# Patient Record
Sex: FEMALE | Race: WHITE | HISPANIC OR LATINO | URBAN - METROPOLITAN AREA
[De-identification: names, ages, dates, MRNs, and addresses within clinical notes are randomized per-mention and may not be internally consistent; named-entity substitution may affect disease eponyms.]

---

## 2017-05-03 ENCOUNTER — OUTPATIENT (OUTPATIENT)
Dept: OUTPATIENT SERVICES | Facility: HOSPITAL | Age: 72
LOS: 1 days | End: 2017-05-03
Payer: COMMERCIAL

## 2017-05-03 VITALS
OXYGEN SATURATION: 98 % | RESPIRATION RATE: 16 BRPM | HEART RATE: 60 BPM | WEIGHT: 153 LBS | TEMPERATURE: 98 F | SYSTOLIC BLOOD PRESSURE: 120 MMHG | HEIGHT: 59 IN | DIASTOLIC BLOOD PRESSURE: 70 MMHG

## 2017-05-03 DIAGNOSIS — Z01.818 ENCOUNTER FOR OTHER PREPROCEDURAL EXAMINATION: ICD-10-CM

## 2017-05-03 DIAGNOSIS — M17.11 UNILATERAL PRIMARY OSTEOARTHRITIS, RIGHT KNEE: ICD-10-CM

## 2017-05-03 DIAGNOSIS — Z98.89 OTHER SPECIFIED POSTPROCEDURAL STATES: Chronic | ICD-10-CM

## 2017-05-03 DIAGNOSIS — K81.9 CHOLECYSTITIS, UNSPECIFIED: Chronic | ICD-10-CM

## 2017-05-03 LAB
ANION GAP SERPL CALC-SCNC: 6 MMOL/L — SIGNIFICANT CHANGE UP (ref 5–17)
APTT BLD: 32.3 SEC — SIGNIFICANT CHANGE UP (ref 27.5–37.4)
BUN SERPL-MCNC: 26 MG/DL — HIGH (ref 7–18)
CALCIUM SERPL-MCNC: 8.7 MG/DL — SIGNIFICANT CHANGE UP (ref 8.4–10.5)
CHLORIDE SERPL-SCNC: 105 MMOL/L — SIGNIFICANT CHANGE UP (ref 96–108)
CO2 SERPL-SCNC: 28 MMOL/L — SIGNIFICANT CHANGE UP (ref 22–31)
CREAT SERPL-MCNC: 1.05 MG/DL — SIGNIFICANT CHANGE UP (ref 0.5–1.3)
GLUCOSE SERPL-MCNC: 88 MG/DL — SIGNIFICANT CHANGE UP (ref 70–99)
HCT VFR BLD CALC: 37.7 % — SIGNIFICANT CHANGE UP (ref 34.5–45)
HGB BLD-MCNC: 11.6 G/DL — SIGNIFICANT CHANGE UP (ref 11.5–15.5)
INR BLD: 1.02 RATIO — SIGNIFICANT CHANGE UP (ref 0.88–1.16)
MCHC RBC-ENTMCNC: 29.8 PG — SIGNIFICANT CHANGE UP (ref 27–34)
MCHC RBC-ENTMCNC: 30.9 GM/DL — LOW (ref 32–36)
MCV RBC AUTO: 96.6 FL — SIGNIFICANT CHANGE UP (ref 80–100)
MRSA PCR RESULT.: SIGNIFICANT CHANGE UP
PLATELET # BLD AUTO: 226 K/UL — SIGNIFICANT CHANGE UP (ref 150–400)
POTASSIUM SERPL-MCNC: 4.6 MMOL/L — SIGNIFICANT CHANGE UP (ref 3.5–5.3)
POTASSIUM SERPL-SCNC: 4.6 MMOL/L — SIGNIFICANT CHANGE UP (ref 3.5–5.3)
PROTHROM AB SERPL-ACNC: 11.1 SEC — SIGNIFICANT CHANGE UP (ref 9.8–12.7)
RBC # BLD: 3.9 M/UL — SIGNIFICANT CHANGE UP (ref 3.8–5.2)
RBC # FLD: 13.9 % — SIGNIFICANT CHANGE UP (ref 10.3–14.5)
S AUREUS DNA NOSE QL NAA+PROBE: DETECTED
SODIUM SERPL-SCNC: 139 MMOL/L — SIGNIFICANT CHANGE UP (ref 135–145)
TSH SERPL-MCNC: 1.29 UU/ML — SIGNIFICANT CHANGE UP (ref 0.34–4.82)
WBC # BLD: 4.6 K/UL — SIGNIFICANT CHANGE UP (ref 3.8–10.5)
WBC # FLD AUTO: 4.6 K/UL — SIGNIFICANT CHANGE UP (ref 3.8–10.5)

## 2017-05-03 PROCEDURE — 85610 PROTHROMBIN TIME: CPT

## 2017-05-03 PROCEDURE — 86901 BLOOD TYPING SEROLOGIC RH(D): CPT

## 2017-05-03 PROCEDURE — 87640 STAPH A DNA AMP PROBE: CPT

## 2017-05-03 PROCEDURE — 80048 BASIC METABOLIC PNL TOTAL CA: CPT

## 2017-05-03 PROCEDURE — G0463: CPT

## 2017-05-03 PROCEDURE — 84443 ASSAY THYROID STIM HORMONE: CPT

## 2017-05-03 PROCEDURE — 86850 RBC ANTIBODY SCREEN: CPT

## 2017-05-03 PROCEDURE — 85730 THROMBOPLASTIN TIME PARTIAL: CPT

## 2017-05-03 PROCEDURE — 85027 COMPLETE CBC AUTOMATED: CPT

## 2017-05-03 PROCEDURE — 87641 MR-STAPH DNA AMP PROBE: CPT

## 2017-05-03 PROCEDURE — 86900 BLOOD TYPING SEROLOGIC ABO: CPT

## 2017-05-03 NOTE — H&P PST ADULT - VENOUS THROMBOEMBOLISM CURRENT STATUS
none/major surgery, including arthroscopic and laparoscopic (greater than 1 hr) elective major lower extremity arthroplasty

## 2017-05-03 NOTE — H&P PST ADULT - PMH
Anemia    CAD (coronary artery disease)    Depression    History of hypertension    Hyperlipidemia    Hypothyroid    Myocardial infarction syndrome  Oct 2002, stent to mCx  Osteoarthritis

## 2017-05-03 NOTE — H&P PST ADULT - PSH
Cholecystitis  s/p cholecystectomy  History of angioplasty  2002: mCx  History of shoulder surgery  left-2012, right -2015

## 2017-05-03 NOTE — H&P PST ADULT - HISTORY OF PRESENT ILLNESS
70 year old female with pmhx of CAD, hypertension, depression, Hypothyroidism, anemia here today with c/o pain to right shoulder x months. Patient is here today for presurgical testing for scheduled right shoulder arthroscopy on 7/22/15 71 year old female with pmhx of CAD, hypertension, depression, Hypothyroidism, anemia here today with c/o pain to right knee x months. Patient is here today for presurgical testing for scheduled right total knee replacement on 5/11/17.

## 2017-05-03 NOTE — H&P PST ADULT - ASSESSMENT
70 year old female with right rotator cuff syndrome scheduled for right shoulder arthroscopy on 7/22/15. Patient is moderate risk for procedure 71 year old female with pmhx of CAD, hypertension, depression, Hypothyroidism, anemia here today with c/o pain to right knee x months. Patient is here today for presurgical testing for scheduled right total knee replacement on 5/11/17.  Patient is moderate risk for procedure 71 year old female with pmhx of CAD, hypertension, depression, Hypothyroidism, anemia presented with primary OA to right knee.

## 2017-05-03 NOTE — H&P PST ADULT - MS GEN HX ROS MEA POS PC
arthritis/right shoulder/joint pain/stiffness leg pain R/joint swelling/joint pain/stiffness/right knee/arthritis

## 2017-05-03 NOTE — H&P PST ADULT - FAMILY HISTORY
Mother  Still living? No  Family history of cardiomyopathy, Age at diagnosis: 61-70     Father  Still living? No  Family history of coronary artery disease, Age at diagnosis: 71-80     Sibling  Still living? No  Family history of coronary artery disease, Age at diagnosis: 61-70  Family history of lung cancer, Age at diagnosis: Age Unknown

## 2017-05-03 NOTE — H&P PST ADULT - NSANTHOSAYNRD_GEN_A_CORE
pt advised to f/u with pulmonology to be assessed  for MARY/No. MARY screening performed.  STOP BANG Legend: 0-2 = LOW Risk; 3-4 = INTERMEDIATE Risk; 5-8 = HIGH Risk

## 2017-05-05 RX ORDER — MUPIROCIN 20 MG/G
1 OINTMENT TOPICAL
Qty: 1 | Refills: 0 | OUTPATIENT
Start: 2017-05-05 | End: 2017-05-10

## 2017-05-11 ENCOUNTER — INPATIENT (INPATIENT)
Facility: HOSPITAL | Age: 72
LOS: 4 days | Discharge: NURSING, REHAB OR LONG-TERM CARE | DRG: 470 | End: 2017-05-16
Attending: ORTHOPAEDIC SURGERY | Admitting: ORTHOPAEDIC SURGERY
Payer: COMMERCIAL

## 2017-05-11 ENCOUNTER — RESULT REVIEW (OUTPATIENT)
Age: 72
End: 2017-05-11

## 2017-05-11 ENCOUNTER — TRANSCRIPTION ENCOUNTER (OUTPATIENT)
Age: 72
End: 2017-05-11

## 2017-05-11 VITALS
TEMPERATURE: 97 F | SYSTOLIC BLOOD PRESSURE: 116 MMHG | WEIGHT: 153 LBS | RESPIRATION RATE: 17 BRPM | DIASTOLIC BLOOD PRESSURE: 61 MMHG | HEART RATE: 61 BPM | OXYGEN SATURATION: 99 % | HEIGHT: 59 IN

## 2017-05-11 DIAGNOSIS — K81.9 CHOLECYSTITIS, UNSPECIFIED: Chronic | ICD-10-CM

## 2017-05-11 DIAGNOSIS — Z98.89 OTHER SPECIFIED POSTPROCEDURAL STATES: Chronic | ICD-10-CM

## 2017-05-11 DIAGNOSIS — M17.11 UNILATERAL PRIMARY OSTEOARTHRITIS, RIGHT KNEE: ICD-10-CM

## 2017-05-11 LAB
ANION GAP SERPL CALC-SCNC: 7 MMOL/L — SIGNIFICANT CHANGE UP (ref 5–17)
BUN SERPL-MCNC: 28 MG/DL — HIGH (ref 7–18)
CALCIUM SERPL-MCNC: 8.3 MG/DL — LOW (ref 8.4–10.5)
CHLORIDE SERPL-SCNC: 109 MMOL/L — HIGH (ref 96–108)
CO2 SERPL-SCNC: 28 MMOL/L — SIGNIFICANT CHANGE UP (ref 22–31)
CREAT SERPL-MCNC: 0.97 MG/DL — SIGNIFICANT CHANGE UP (ref 0.5–1.3)
GLUCOSE SERPL-MCNC: 93 MG/DL — SIGNIFICANT CHANGE UP (ref 70–99)
HCT VFR BLD CALC: 31.4 % — LOW (ref 34.5–45)
HGB BLD-MCNC: 9.9 G/DL — LOW (ref 11.5–15.5)
MCHC RBC-ENTMCNC: 30.5 PG — SIGNIFICANT CHANGE UP (ref 27–34)
MCHC RBC-ENTMCNC: 31.6 GM/DL — LOW (ref 32–36)
MCV RBC AUTO: 96.5 FL — SIGNIFICANT CHANGE UP (ref 80–100)
PLATELET # BLD AUTO: 180 K/UL — SIGNIFICANT CHANGE UP (ref 150–400)
POTASSIUM SERPL-MCNC: 4.3 MMOL/L — SIGNIFICANT CHANGE UP (ref 3.5–5.3)
POTASSIUM SERPL-SCNC: 4.3 MMOL/L — SIGNIFICANT CHANGE UP (ref 3.5–5.3)
RBC # BLD: 3.25 M/UL — LOW (ref 3.8–5.2)
RBC # FLD: 14.4 % — SIGNIFICANT CHANGE UP (ref 10.3–14.5)
SODIUM SERPL-SCNC: 144 MMOL/L — SIGNIFICANT CHANGE UP (ref 135–145)
WBC # BLD: 5 K/UL — SIGNIFICANT CHANGE UP (ref 3.8–10.5)
WBC # FLD AUTO: 5 K/UL — SIGNIFICANT CHANGE UP (ref 3.8–10.5)

## 2017-05-11 PROCEDURE — 20900 REMOVAL OF BONE FOR GRAFT: CPT | Mod: AS,RT

## 2017-05-11 PROCEDURE — 88305 TISSUE EXAM BY PATHOLOGIST: CPT | Mod: 26

## 2017-05-11 PROCEDURE — 73562 X-RAY EXAM OF KNEE 3: CPT | Mod: 26,RT

## 2017-05-11 PROCEDURE — 88311 DECALCIFY TISSUE: CPT | Mod: 26

## 2017-05-11 PROCEDURE — 27447 TOTAL KNEE ARTHROPLASTY: CPT | Mod: AS,RT

## 2017-05-11 RX ORDER — SODIUM CHLORIDE 9 MG/ML
3 INJECTION INTRAMUSCULAR; INTRAVENOUS; SUBCUTANEOUS EVERY 8 HOURS
Qty: 0 | Refills: 0 | Status: DISCONTINUED | OUTPATIENT
Start: 2017-05-11 | End: 2017-05-11

## 2017-05-11 RX ORDER — CLOPIDOGREL BISULFATE 75 MG/1
75 TABLET, FILM COATED ORAL DAILY
Qty: 0 | Refills: 0 | Status: DISCONTINUED | OUTPATIENT
Start: 2017-05-11 | End: 2017-05-16

## 2017-05-11 RX ORDER — SERTRALINE 25 MG/1
50 TABLET, FILM COATED ORAL DAILY
Qty: 0 | Refills: 0 | Status: DISCONTINUED | OUTPATIENT
Start: 2017-05-11 | End: 2017-05-16

## 2017-05-11 RX ORDER — ONDANSETRON 8 MG/1
4 TABLET, FILM COATED ORAL EVERY 6 HOURS
Qty: 0 | Refills: 0 | Status: DISCONTINUED | OUTPATIENT
Start: 2017-05-11 | End: 2017-05-16

## 2017-05-11 RX ORDER — FOLIC ACID 0.8 MG
1 TABLET ORAL DAILY
Qty: 0 | Refills: 0 | Status: DISCONTINUED | OUTPATIENT
Start: 2017-05-11 | End: 2017-05-16

## 2017-05-11 RX ORDER — FERROUS SULFATE 325(65) MG
325 TABLET ORAL
Qty: 0 | Refills: 0 | Status: DISCONTINUED | OUTPATIENT
Start: 2017-05-11 | End: 2017-05-16

## 2017-05-11 RX ORDER — ZOLPIDEM TARTRATE 10 MG/1
5 TABLET ORAL AT BEDTIME
Qty: 0 | Refills: 0 | Status: DISCONTINUED | OUTPATIENT
Start: 2017-05-11 | End: 2017-05-16

## 2017-05-11 RX ORDER — ENOXAPARIN SODIUM 100 MG/ML
30 INJECTION SUBCUTANEOUS EVERY 12 HOURS
Qty: 0 | Refills: 0 | Status: DISCONTINUED | OUTPATIENT
Start: 2017-05-12 | End: 2017-05-16

## 2017-05-11 RX ORDER — FUROSEMIDE 40 MG
20 TABLET ORAL DAILY
Qty: 0 | Refills: 0 | Status: DISCONTINUED | OUTPATIENT
Start: 2017-05-11 | End: 2017-05-16

## 2017-05-11 RX ORDER — MAGNESIUM HYDROXIDE 400 MG/1
30 TABLET, CHEWABLE ORAL DAILY
Qty: 0 | Refills: 0 | Status: DISCONTINUED | OUTPATIENT
Start: 2017-05-11 | End: 2017-05-16

## 2017-05-11 RX ORDER — ACETAMINOPHEN 500 MG
650 TABLET ORAL EVERY 6 HOURS
Qty: 0 | Refills: 0 | Status: DISCONTINUED | OUTPATIENT
Start: 2017-05-11 | End: 2017-05-16

## 2017-05-11 RX ORDER — DOXAZOSIN MESYLATE 4 MG
1 TABLET ORAL AT BEDTIME
Qty: 0 | Refills: 0 | Status: DISCONTINUED | OUTPATIENT
Start: 2017-05-11 | End: 2017-05-16

## 2017-05-11 RX ORDER — PANTOPRAZOLE SODIUM 20 MG/1
40 TABLET, DELAYED RELEASE ORAL
Qty: 0 | Refills: 0 | Status: DISCONTINUED | OUTPATIENT
Start: 2017-05-11 | End: 2017-05-16

## 2017-05-11 RX ORDER — SODIUM CHLORIDE 9 MG/ML
1000 INJECTION, SOLUTION INTRAVENOUS
Qty: 0 | Refills: 0 | Status: DISCONTINUED | OUTPATIENT
Start: 2017-05-11 | End: 2017-05-16

## 2017-05-11 RX ORDER — DOCUSATE SODIUM 100 MG
100 CAPSULE ORAL THREE TIMES A DAY
Qty: 0 | Refills: 0 | Status: DISCONTINUED | OUTPATIENT
Start: 2017-05-11 | End: 2017-05-16

## 2017-05-11 RX ORDER — ASCORBIC ACID 60 MG
500 TABLET,CHEWABLE ORAL
Qty: 0 | Refills: 0 | Status: DISCONTINUED | OUTPATIENT
Start: 2017-05-11 | End: 2017-05-16

## 2017-05-11 RX ORDER — BUPIVACAINE 13.3 MG/ML
20 INJECTION, SUSPENSION, LIPOSOMAL INFILTRATION ONCE
Qty: 0 | Refills: 0 | Status: DISCONTINUED | OUTPATIENT
Start: 2017-05-11 | End: 2017-05-16

## 2017-05-11 RX ORDER — SENNA PLUS 8.6 MG/1
2 TABLET ORAL AT BEDTIME
Qty: 0 | Refills: 0 | Status: DISCONTINUED | OUTPATIENT
Start: 2017-05-11 | End: 2017-05-16

## 2017-05-11 RX ORDER — ACETAMINOPHEN 500 MG
1000 TABLET ORAL ONCE
Qty: 0 | Refills: 0 | Status: COMPLETED | OUTPATIENT
Start: 2017-05-11 | End: 2017-05-12

## 2017-05-11 RX ORDER — GEMFIBROZIL 600 MG
600 TABLET ORAL
Qty: 0 | Refills: 0 | Status: DISCONTINUED | OUTPATIENT
Start: 2017-05-11 | End: 2017-05-16

## 2017-05-11 RX ORDER — VALSARTAN 80 MG/1
320 TABLET ORAL DAILY
Qty: 0 | Refills: 0 | Status: DISCONTINUED | OUTPATIENT
Start: 2017-05-11 | End: 2017-05-16

## 2017-05-11 RX ORDER — CEFAZOLIN SODIUM 1 G
1000 VIAL (EA) INJECTION EVERY 8 HOURS
Qty: 0 | Refills: 0 | Status: COMPLETED | OUTPATIENT
Start: 2017-05-11 | End: 2017-05-12

## 2017-05-11 RX ORDER — HYDROMORPHONE HYDROCHLORIDE 2 MG/ML
0.5 INJECTION INTRAMUSCULAR; INTRAVENOUS; SUBCUTANEOUS EVERY 4 HOURS
Qty: 0 | Refills: 0 | Status: DISCONTINUED | OUTPATIENT
Start: 2017-05-11 | End: 2017-05-16

## 2017-05-11 RX ORDER — GABAPENTIN 400 MG/1
300 CAPSULE ORAL
Qty: 0 | Refills: 0 | Status: DISCONTINUED | OUTPATIENT
Start: 2017-05-11 | End: 2017-05-16

## 2017-05-11 RX ORDER — CARVEDILOL PHOSPHATE 80 MG/1
25 CAPSULE, EXTENDED RELEASE ORAL EVERY 12 HOURS
Qty: 0 | Refills: 0 | Status: DISCONTINUED | OUTPATIENT
Start: 2017-05-11 | End: 2017-05-16

## 2017-05-11 RX ORDER — LEVOTHYROXINE SODIUM 125 MCG
75 TABLET ORAL DAILY
Qty: 0 | Refills: 0 | Status: DISCONTINUED | OUTPATIENT
Start: 2017-05-11 | End: 2017-05-16

## 2017-05-11 RX ADMIN — ZOLPIDEM TARTRATE 5 MILLIGRAM(S): 10 TABLET ORAL at 21:58

## 2017-05-11 RX ADMIN — Medication 600 MILLIGRAM(S): at 18:39

## 2017-05-11 RX ADMIN — ZOLPIDEM TARTRATE 5 MILLIGRAM(S): 10 TABLET ORAL at 23:59

## 2017-05-11 RX ADMIN — Medication 500 MILLIGRAM(S): at 18:34

## 2017-05-11 RX ADMIN — Medication 100 MILLIGRAM(S): at 18:45

## 2017-05-11 RX ADMIN — Medication 325 MILLIGRAM(S): at 18:37

## 2017-05-11 RX ADMIN — CARVEDILOL PHOSPHATE 25 MILLIGRAM(S): 80 CAPSULE, EXTENDED RELEASE ORAL at 18:34

## 2017-05-11 RX ADMIN — Medication 100 MILLIGRAM(S): at 21:32

## 2017-05-11 RX ADMIN — Medication 1 MILLIGRAM(S): at 21:32

## 2017-05-11 RX ADMIN — GABAPENTIN 300 MILLIGRAM(S): 400 CAPSULE ORAL at 18:39

## 2017-05-11 RX ADMIN — SODIUM CHLORIDE 3 MILLILITER(S): 9 INJECTION INTRAMUSCULAR; INTRAVENOUS; SUBCUTANEOUS at 07:57

## 2017-05-12 ENCOUNTER — TRANSCRIPTION ENCOUNTER (OUTPATIENT)
Age: 72
End: 2017-05-12

## 2017-05-12 DIAGNOSIS — M25.561 PAIN IN RIGHT KNEE: ICD-10-CM

## 2017-05-12 DIAGNOSIS — Z96.651 PRESENCE OF RIGHT ARTIFICIAL KNEE JOINT: ICD-10-CM

## 2017-05-12 DIAGNOSIS — G89.18 OTHER ACUTE POSTPROCEDURAL PAIN: ICD-10-CM

## 2017-05-12 LAB
ANION GAP SERPL CALC-SCNC: 8 MMOL/L — SIGNIFICANT CHANGE UP (ref 5–17)
BUN SERPL-MCNC: 18 MG/DL — SIGNIFICANT CHANGE UP (ref 7–18)
CALCIUM SERPL-MCNC: 7.9 MG/DL — LOW (ref 8.4–10.5)
CHLORIDE SERPL-SCNC: 106 MMOL/L — SIGNIFICANT CHANGE UP (ref 96–108)
CO2 SERPL-SCNC: 26 MMOL/L — SIGNIFICANT CHANGE UP (ref 22–31)
CREAT SERPL-MCNC: 0.98 MG/DL — SIGNIFICANT CHANGE UP (ref 0.5–1.3)
GLUCOSE SERPL-MCNC: 118 MG/DL — HIGH (ref 70–99)
HCT VFR BLD CALC: 28 % — LOW (ref 34.5–45)
HGB BLD-MCNC: 9 G/DL — LOW (ref 11.5–15.5)
MCHC RBC-ENTMCNC: 31 PG — SIGNIFICANT CHANGE UP (ref 27–34)
MCHC RBC-ENTMCNC: 32.1 GM/DL — SIGNIFICANT CHANGE UP (ref 32–36)
MCV RBC AUTO: 96.3 FL — SIGNIFICANT CHANGE UP (ref 80–100)
PLATELET # BLD AUTO: 154 K/UL — SIGNIFICANT CHANGE UP (ref 150–400)
POTASSIUM SERPL-MCNC: 4.2 MMOL/L — SIGNIFICANT CHANGE UP (ref 3.5–5.3)
POTASSIUM SERPL-SCNC: 4.2 MMOL/L — SIGNIFICANT CHANGE UP (ref 3.5–5.3)
RBC # BLD: 2.91 M/UL — LOW (ref 3.8–5.2)
RBC # FLD: 14.3 % — SIGNIFICANT CHANGE UP (ref 10.3–14.5)
SODIUM SERPL-SCNC: 140 MMOL/L — SIGNIFICANT CHANGE UP (ref 135–145)
WBC # BLD: 5.8 K/UL — SIGNIFICANT CHANGE UP (ref 3.8–10.5)
WBC # FLD AUTO: 5.8 K/UL — SIGNIFICANT CHANGE UP (ref 3.8–10.5)

## 2017-05-12 PROCEDURE — 99232 SBSQ HOSP IP/OBS MODERATE 35: CPT

## 2017-05-12 RX ORDER — DOCUSATE SODIUM 100 MG
1 CAPSULE ORAL
Qty: 0 | Refills: 0 | COMMUNITY
Start: 2017-05-12

## 2017-05-12 RX ORDER — ENOXAPARIN SODIUM 100 MG/ML
40 INJECTION SUBCUTANEOUS
Qty: 0 | Refills: 0 | DISCHARGE
Start: 2017-05-12

## 2017-05-12 RX ORDER — SODIUM CHLORIDE 9 MG/ML
500 INJECTION INTRAMUSCULAR; INTRAVENOUS; SUBCUTANEOUS ONCE
Qty: 0 | Refills: 0 | Status: COMPLETED | OUTPATIENT
Start: 2017-05-12 | End: 2017-05-12

## 2017-05-12 RX ORDER — ACETAMINOPHEN 500 MG
2 TABLET ORAL
Qty: 0 | Refills: 0 | COMMUNITY

## 2017-05-12 RX ORDER — FOLIC ACID 0.8 MG
1 TABLET ORAL
Qty: 0 | Refills: 0 | COMMUNITY
Start: 2017-05-12

## 2017-05-12 RX ORDER — ASCORBIC ACID 60 MG
1 TABLET,CHEWABLE ORAL
Qty: 0 | Refills: 0 | DISCHARGE
Start: 2017-05-12

## 2017-05-12 RX ORDER — KETOROLAC TROMETHAMINE 30 MG/ML
15 SYRINGE (ML) INJECTION EVERY 6 HOURS
Qty: 0 | Refills: 0 | Status: DISCONTINUED | OUTPATIENT
Start: 2017-05-12 | End: 2017-05-16

## 2017-05-12 RX ADMIN — Medication 325 MILLIGRAM(S): at 08:32

## 2017-05-12 RX ADMIN — Medication 325 MILLIGRAM(S): at 17:09

## 2017-05-12 RX ADMIN — SODIUM CHLORIDE 500 MILLILITER(S): 9 INJECTION INTRAMUSCULAR; INTRAVENOUS; SUBCUTANEOUS at 10:08

## 2017-05-12 RX ADMIN — Medication 1000 MILLIGRAM(S): at 13:00

## 2017-05-12 RX ADMIN — Medication 325 MILLIGRAM(S): at 12:35

## 2017-05-12 RX ADMIN — Medication 600 MILLIGRAM(S): at 17:08

## 2017-05-12 RX ADMIN — ZOLPIDEM TARTRATE 5 MILLIGRAM(S): 10 TABLET ORAL at 21:55

## 2017-05-12 RX ADMIN — CARVEDILOL PHOSPHATE 25 MILLIGRAM(S): 80 CAPSULE, EXTENDED RELEASE ORAL at 05:30

## 2017-05-12 RX ADMIN — Medication 500 MILLIGRAM(S): at 05:30

## 2017-05-12 RX ADMIN — ENOXAPARIN SODIUM 30 MILLIGRAM(S): 100 INJECTION SUBCUTANEOUS at 17:09

## 2017-05-12 RX ADMIN — Medication 100 MILLIGRAM(S): at 12:35

## 2017-05-12 RX ADMIN — Medication 400 MILLIGRAM(S): at 12:35

## 2017-05-12 RX ADMIN — GABAPENTIN 300 MILLIGRAM(S): 400 CAPSULE ORAL at 05:30

## 2017-05-12 RX ADMIN — ENOXAPARIN SODIUM 30 MILLIGRAM(S): 100 INJECTION SUBCUTANEOUS at 05:30

## 2017-05-12 RX ADMIN — Medication 1 MILLIGRAM(S): at 12:35

## 2017-05-12 RX ADMIN — Medication 500 MILLIGRAM(S): at 17:08

## 2017-05-12 RX ADMIN — GABAPENTIN 300 MILLIGRAM(S): 400 CAPSULE ORAL at 17:08

## 2017-05-12 RX ADMIN — VALSARTAN 320 MILLIGRAM(S): 80 TABLET ORAL at 05:30

## 2017-05-12 RX ADMIN — Medication 100 MILLIGRAM(S): at 21:55

## 2017-05-12 RX ADMIN — Medication 100 MILLIGRAM(S): at 01:03

## 2017-05-12 RX ADMIN — Medication 100 MILLIGRAM(S): at 05:30

## 2017-05-12 RX ADMIN — CLOPIDOGREL BISULFATE 75 MILLIGRAM(S): 75 TABLET, FILM COATED ORAL at 12:34

## 2017-05-12 RX ADMIN — PANTOPRAZOLE SODIUM 40 MILLIGRAM(S): 20 TABLET, DELAYED RELEASE ORAL at 05:30

## 2017-05-12 RX ADMIN — Medication 75 MICROGRAM(S): at 05:30

## 2017-05-12 RX ADMIN — Medication 20 MILLIGRAM(S): at 05:30

## 2017-05-12 RX ADMIN — SERTRALINE 50 MILLIGRAM(S): 25 TABLET, FILM COATED ORAL at 12:35

## 2017-05-12 NOTE — DISCHARGE NOTE ADULT - CARE PLAN
Principal Discharge DX:	Primary osteoarthritis of right knee  Goal:	Right Total Knee Replacement  Instructions for follow-up, activity and diet:	Improve ROM

## 2017-05-12 NOTE — DISCHARGE NOTE ADULT - MEDICATION SUMMARY - MEDICATIONS TO STOP TAKING
I will STOP taking the medications listed below when I get home from the hospital:    Tylenol 500 mg oral tablet  -- 2 tab(s) by mouth every 6 hours, As Needed    mupirocin topical 2% topical ointment  -- 1 application in each nostril 2 times a day for 5 days  -- For external use only.

## 2017-05-12 NOTE — DISCHARGE NOTE ADULT - CARE PROVIDER_API CALL
Jeremias Riley), Orthopaedic Surgery  [address] [place] [address]  Phone: [phone]  Fax: [phone]

## 2017-05-12 NOTE — PROGRESS NOTE ADULT - PROBLEM SELECTOR PLAN 1
Scheduled right total knee replacement on 5/11/17.  Patient is moderate risk for procedure
- toradol 15mg ivp prn  - percocet po prn  - oob  - 500cc iv bolus  - monitor vitals

## 2017-05-12 NOTE — PROGRESS NOTE ADULT - SUBJECTIVE AND OBJECTIVE BOX
Chief Complaint: 71 year old female s/p right knee replacement.  + pain right knee.  Pt oob with Pt.  + dizziness and hypotension on standing and ambulating.        Allergies    No Known Allergies    Intolerances      MEDICATIONS  (STANDING):  BUpivacaine liposome 1.3% Injectable 20milliLiter(s) Local Injection once  valsartan 320milliGRAM(s) Oral daily  doxazosin 1milliGRAM(s) Oral at bedtime  gabapentin 300milliGRAM(s) Oral two times a day  sertraline 50milliGRAM(s) Oral daily  gemfibrozil 600milliGRAM(s) Oral before dinner  clopidogrel Tablet 75milliGRAM(s) Oral daily  carvedilol 25milliGRAM(s) Oral every 12 hours  furosemide    Tablet 20milliGRAM(s) Oral daily  pantoprazole    Tablet 40milliGRAM(s) Oral before breakfast  levothyroxine 75MICROGram(s) Oral daily  dextrose 5% + sodium chloride 0.45%. 1000milliLiter(s) IV Continuous <Continuous>  enoxaparin Injectable 30milliGRAM(s) SubCutaneous every 12 hours  docusate sodium 100milliGRAM(s) Oral three times a day  ferrous    sulfate 325milliGRAM(s) Oral three times a day with meals  folic acid 1milliGRAM(s) Oral daily  ascorbic acid 500milliGRAM(s) Oral two times a day  acetaminophen  IVPB. 1000milliGRAM(s) IV Intermittent once    MEDICATIONS  (PRN):  acetaminophen   Tablet 650milliGRAM(s) Oral every 6 hours PRN For Temp over 38.3 C (100.94 F)  aluminum hydroxide/magnesium hydroxide/simethicone Suspension 30milliLiter(s) Oral four times a day PRN Indigestion  ondansetron Injectable 4milliGRAM(s) IV Push every 6 hours PRN Nausea and/or Vomiting  magnesium hydroxide Suspension 30milliLiter(s) Oral daily PRN Constipation  senna 2Tablet(s) Oral at bedtime PRN Constipation  oxyCODONE  5 mG/acetaminophen 325 mG 1Tablet(s) Oral every 4 hours PRN Mild Pain (1 - 3)  oxyCODONE  5 mG/acetaminophen 325 mG 2Tablet(s) Oral every 4 hours PRN Moderate Pain (4 - 6)  HYDROmorphone  Injectable 0.5milliGRAM(s) IV Push every 4 hours PRN Severe Pain (7 - 10)  zolpidem 5milliGRAM(s) Oral at bedtime PRN Insomnia  zolpidem 5milliGRAM(s) Oral at bedtime PRN Insomnia  ketorolac   Injectable 15milliGRAM(s) IV Push every 6 hours PRN breakthrough pain        Pain is __X_ sharp ____dull ___burning ___achy ___     Intensity: ____ mild _X___mod ____severe     Location _X____surgical site _____cervical _____lumbar ____abd _____upper ext___X_lower ext ____other    Worse with __X__activity ____movement _____physical therapy___ Rest    Improved with ___X_medication ____rest ____physical therapy      REVIEW OF SYSTEMS:  CONSTITUTIONAL: Negative fever, chills  NECK: No pain or stiffness  RESPIRATORY: No cough, wheezing,  No shortness of breath  GASTROINTESTINAL: No abdominal, No nausea, vomiting; No diarrhea or constipation.   GENITOURINARY: No dysuria, frequency, or incontinence  NEUROLOGICAL:+ dizziness  SKIN: No itching, burning, rashes, or lesions   MUSCULOSKELETAL: +right knee pain    PHYSICAL EXAM:    Vital Signs Last 24 Hrs  T(C): 36.6, Max: 36.7 (05-11 @ 13:05)  T(F): 97.9, Max: 98.1 (05-11 @ 13:05)  HR: 67 (55 - 71)  BP: 94/52 (94/52 - 144/61)  BP(mean): 75 (71 - 76)  RR: 17 (12 - 18)  SpO2: 95% (95% - 100%)    PAIN SCORE:    5/10     SCALE USED: (1-10 VNRS)       GENERAL: +dizziness, lightheaded   HEENT:  Atraumatic, Normocephalic, PERRL, EOMI  NECK: Supple  LUNG: Respiration even and unlabored, no distress noted  ABDOMEN: Soft, Nontender, Nondistended  BACK: No midline bony tenderness to palpation, no step off or deformity noted.   EXTREMITIES:  MACIAS X 4; 2+ Peripheral Pulses, +right knee pain, mild edema  NEUROLOGIC: Awake, alert and oriented X3;   SKIN: Warm and Dry    LABS:                          9.0    5.8   )-----------( 154      ( 12 May 2017 06:32 )             28.0     05-12    140  |  106  |  18  ----------------------------<  118<H>  4.2   |  26  |  0.98    Ca    7.9<L>      12 May 2017 06:32

## 2017-05-12 NOTE — PHYSICAL THERAPY INITIAL EVALUATION ADULT - CRITERIA FOR SKILLED THERAPEUTIC INTERVENTIONS
rehab potential/functional limitations in following categories/predicted duration of therapy intervention/impairments found/therapy frequency

## 2017-05-12 NOTE — DISCHARGE NOTE ADULT - MEDICATION SUMMARY - MEDICATIONS TO TAKE
I will START or STAY ON the medications listed below when I get home from the hospital:    Percocet 5/325 oral tablet  -- 1 tab(s) by mouth every 4 hours  -- Indication: For Pain    valsartan 320 mg oral tablet  -- 1 tab(s) by mouth once a day  -- Indication: For as per md    doxazosin 1 mg oral tablet  -- 1 tab(s) by mouth once a day (at bedtime)  -- Indication: For as per md    enoxaparin  -- 40 milligram(s) subcutaneous once a day  -- d/c 5/26/17  -- Indication: For dvt prophylaxis    gabapentin 300 mg oral capsule  -- 1 cap(s) by mouth 2 times a day  -- Indication: For as per md    sertraline 50 mg oral tablet  -- 1 tab(s) by mouth once a day  -- Indication: For as per md    gemfibrozil 600 mg oral tablet  -- 1 tab(s) by mouth once a day (at bedtime)  -- Indication: For as per md    Plavix 75 mg oral tablet  -- 1 tab(s) by mouth once a day  -- Indication: For as per md    zolpidem 10 mg oral tablet  -- 1 tab(s) by mouth once a day (at bedtime)  -- Indication: For as per md    carvedilol 25 mg oral tablet  -- 1 tab(s) by mouth 2 times a day  -- Indication: For as per md    furosemide 40 mg oral tablet  -- 1 tab(s) by mouth once a day  -- Indication: For as per md    ferrous sulfate 325 mg (65 mg elemental iron) oral tablet  -- 1 tab(s) by mouth 2 times a day  -- Indication: For anemia    docusate sodium 100 mg oral capsule  -- 1 cap(s) by mouth 3 times a day  -- Indication: For constipation    omeprazole 40 mg oral delayed release capsule  -- 1 cap(s) by mouth once a day  -- Indication: For as per md    levothyroxine 75 mcg (0.075 mg) oral tablet  -- 1 tab(s) by mouth once a day  -- Indication: For as per md    ascorbic acid 500 mg oral tablet  -- 1 tab(s) by mouth 2 times a day  -- Indication: For supplement    folic acid 1 mg oral tablet  -- 1 tab(s) by mouth once a day  -- Indication: For supplement I will START or STAY ON the medications listed below when I get home from the hospital:    Percocet 5/325 oral tablet  -- 1 tab(s) by mouth every 4 hours MDD:6 tabs  -- Indication: For Pain    valsartan 320 mg oral tablet  -- 1 tab(s) by mouth once a day  -- Indication: For as per md    doxazosin 1 mg oral tablet  -- 1 tab(s) by mouth once a day (at bedtime)  -- Indication: For as per md    enoxaparin  -- 40 milligram(s) subcutaneous once a day  -- d/c 5/26/17  -- Indication: For dvt prophylaxis    enoxaparin 40 mg/0.4 mL injectable solution  -- 40 milligram(s) subcutaneous once a day MDD:40mg  -- It is very important that you take or use this exactly as directed.  Do not skip doses or discontinue unless directed by your doctor.    -- Indication: For dvt prophylaxis    gabapentin 300 mg oral capsule  -- 1 cap(s) by mouth 2 times a day  -- Indication: For as per md    sertraline 50 mg oral tablet  -- 1 tab(s) by mouth once a day  -- Indication: For as per md    gemfibrozil 600 mg oral tablet  -- 1 tab(s) by mouth once a day (at bedtime)  -- Indication: For as per md    Plavix 75 mg oral tablet  -- 1 tab(s) by mouth once a day  -- Indication: For as per md    zolpidem 10 mg oral tablet  -- 1 tab(s) by mouth once a day (at bedtime)  -- Indication: For as per md    carvedilol 25 mg oral tablet  -- 1 tab(s) by mouth 2 times a day  -- Indication: For as per md    furosemide 40 mg oral tablet  -- 1 tab(s) by mouth once a day  -- Indication: For as per md    ferrous sulfate 325 mg (65 mg elemental iron) oral tablet  -- 1 tab(s) by mouth 2 times a day  -- Indication: For Anemia    docusate sodium 100 mg oral capsule  -- 1 cap(s) by mouth 2 times a day  -- Indication: For constipation    omeprazole 40 mg oral delayed release capsule  -- 1 cap(s) by mouth once a day  -- Indication: For as per md    levothyroxine 75 mcg (0.075 mg) oral tablet  -- 1 tab(s) by mouth once a day  -- Indication: For as per md    ascorbic acid 500 mg oral tablet  -- 1 tab(s) by mouth 2 times a day  -- Indication: For Supplement    folic acid 1 mg oral tablet  -- 1 tab(s) by mouth once a day  -- Indication: For Supplement I will START or STAY ON the medications listed below when I get home from the hospital:    acetaminophen-oxycodone 325 mg-5 mg oral tablet  -- 1 tab(s) by mouth every 4 hours, As needed, Mild Pain (1 - 3)  -- Indication: For mild pain     acetaminophen-oxycodone 325 mg-5 mg oral tablet  -- 2 tab(s) by mouth every 4 hours, As needed, Moderate Pain (4 - 6)  -- Indication: For moderate pain     valsartan 320 mg oral tablet  -- 1 tab(s) by mouth once a day  -- Indication: For as per md    doxazosin 1 mg oral tablet  -- 1 tab(s) by mouth once a day (at bedtime)  -- Indication: For as per md    enoxaparin  -- 40 milligram(s) subcutaneous once a day  -- d/c 5/26/17  -- Indication: For dvt prophylaxis    gabapentin 300 mg oral capsule  -- 1 cap(s) by mouth 2 times a day  -- Indication: For as per md    sertraline 50 mg oral tablet  -- 1 tab(s) by mouth once a day  -- Indication: For as per md    gemfibrozil 600 mg oral tablet  -- 1 tab(s) by mouth once a day (at bedtime)  -- Indication: For as per md    Plavix 75 mg oral tablet  -- 1 tab(s) by mouth once a day  -- Indication: For as per md    zolpidem 10 mg oral tablet  -- 1 tab(s) by mouth once a day (at bedtime)  -- Indication: For as per md    carvedilol 25 mg oral tablet  -- 1 tab(s) by mouth 2 times a day  -- Indication: For as per md    furosemide 40 mg oral tablet  -- 1 tab(s) by mouth once a day  -- Indication: For as per md    ferrous sulfate 325 mg (65 mg elemental iron) oral tablet  -- 1 tab(s) by mouth 3 times a day  -- Indication: For Anemia     docusate sodium 100 mg oral capsule  -- 1 cap(s) by mouth 3 times a day  -- Indication: For constipation     omeprazole 40 mg oral delayed release capsule  -- 1 cap(s) by mouth once a day  -- Indication: For as per md    levothyroxine 75 mcg (0.075 mg) oral tablet  -- 1 tab(s) by mouth once a day  -- Indication: For as per md    ascorbic acid 500 mg oral tablet  -- 1 tab(s) by mouth 2 times a day  -- Indication: For viatmin     folic acid 1 mg oral tablet  -- 1 tab(s) by mouth once a day  -- Indication: For vitamin

## 2017-05-12 NOTE — DISCHARGE NOTE ADULT - ADDITIONAL INSTRUCTIONS
Pain Management- See Attached Medication Reconciliation    **StretchStrap Stretching exercises for Total knee replacement***  Weight Bearing Status: _WBAT of RLE  Equipment needs: Commode, Walker  Incision Site: REMOVE STAPLES on 5/26/17  NURSE TO APPLY STERI-STRIPS TO THE WOUND AFTER STAPLE REMOVAL   Dvt prophylaxis: D/C LOVENOX on 5/26/17  PT/Occupational Therapy are Activities of Daily Living as appropriate  Follow up with Orthopedic Surgeon after STAPLES ARE REMOVED at: Dr. Riley [phone]

## 2017-05-12 NOTE — PROGRESS NOTE ADULT - SUBJECTIVE AND OBJECTIVE BOX
71yFemale    Diagnosis:  S/p R Total Knee Replacement POD# 1    Patient was seen and evaluated at bedside. Patient with no acute complaints.   Pain is well controlled.   Denies CP/SOB, dyspnea, paresthesias, N/V/D, palpitations. Due to void    Vital Signs Last 24 Hrs  T(C): 36.6, Max: 36.7 (05-11 @ 13:05)  T(F): 97.9, Max: 98.1 (05-11 @ 13:05)  HR: 71 (55 - 71)  BP: 98/47 (98/47 - 144/61)  BP(mean): 75 (71 - 76)  RR: 17 (12 - 18)  SpO2: 100% (99% - 100%)  I&O's Detail    I & Os for current day (as of 12 May 2017 08:37)  =============================================  IN:    dextrose 5% + sodium chloride 0.45%.: 75 ml    Total IN: 75 ml  ---------------------------------------------  OUT:    Indwelling Catheter - Urethral: 1775 ml    Accordian: 420 ml    Total OUT: 2195 ml  ---------------------------------------------  Total NET: -2120 ml      Physical Exam:    General: AAOx3, NAD, resting comfortably in bed.    R KNEE:  Dressing is C/D/I. Skin is pink and warm. Staples intact. No erythema. SILT.  Wound with no drainage, healing well. HV drain intact.  Lower extremity:  No calf tenderness, calves are soft B/l. 2+pulses. NVI. 5/5 Strength of EHL/TA/gastrocnemius B/L.  Good capillary refill.                           9.0    5.8   )-----------( 154      ( 12 May 2017 06:32 )             28.0     05-12    140  |  106  |  18  ----------------------------<  118<H>  4.2   |  26  |  0.98    Ca    7.9<L>      12 May 2017 06:32        Impression:  71yFemale S/p R Total Knee Replacement POD# 1  Plan:  -  Pain management  -  Dvt prophylaxis  -  Daily Physical Theapy:  WBAT  of the R lower extremity  -  Discharge planning: Home Vs. Rehab pending Physical therapy eval.  -  Continue with Post-op Antibiotics x 24hrs  -  Discontinue Schmitz catheter today  -  Discontinue HV today  -  Case d/w DR. Riley

## 2017-05-12 NOTE — PHYSICAL THERAPY INITIAL EVALUATION ADULT - GAIT DEVIATIONS NOTED, PT EVAL
increased time in double stance/decreased bobo/decreased weight-shifting ability/decreased step length/decreased velocity of limb motion

## 2017-05-12 NOTE — PHYSICAL THERAPY INITIAL EVALUATION ADULT - PASSIVE RANGE OF MOTION EXAMINATION, REHAB EVAL
bilateral upper extremity Passive ROM was WNL (within normal limits)/Left LE Passive ROM was WNL (within normal limits)/deficits as listed below/R knee flexion 50 deg

## 2017-05-13 LAB
ANION GAP SERPL CALC-SCNC: 6 MMOL/L — SIGNIFICANT CHANGE UP (ref 5–17)
BUN SERPL-MCNC: 16 MG/DL — SIGNIFICANT CHANGE UP (ref 7–18)
CALCIUM SERPL-MCNC: 7.8 MG/DL — LOW (ref 8.4–10.5)
CHLORIDE SERPL-SCNC: 108 MMOL/L — SIGNIFICANT CHANGE UP (ref 96–108)
CO2 SERPL-SCNC: 27 MMOL/L — SIGNIFICANT CHANGE UP (ref 22–31)
CREAT SERPL-MCNC: 0.95 MG/DL — SIGNIFICANT CHANGE UP (ref 0.5–1.3)
GLUCOSE SERPL-MCNC: 106 MG/DL — HIGH (ref 70–99)
HCT VFR BLD CALC: 25 % — LOW (ref 34.5–45)
HGB BLD-MCNC: 8 G/DL — LOW (ref 11.5–15.5)
MCHC RBC-ENTMCNC: 31 PG — SIGNIFICANT CHANGE UP (ref 27–34)
MCHC RBC-ENTMCNC: 32.1 GM/DL — SIGNIFICANT CHANGE UP (ref 32–36)
MCV RBC AUTO: 96.6 FL — SIGNIFICANT CHANGE UP (ref 80–100)
PLATELET # BLD AUTO: 149 K/UL — LOW (ref 150–400)
POTASSIUM SERPL-MCNC: 4.1 MMOL/L — SIGNIFICANT CHANGE UP (ref 3.5–5.3)
POTASSIUM SERPL-SCNC: 4.1 MMOL/L — SIGNIFICANT CHANGE UP (ref 3.5–5.3)
RBC # BLD: 2.59 M/UL — LOW (ref 3.8–5.2)
RBC # FLD: 14.2 % — SIGNIFICANT CHANGE UP (ref 10.3–14.5)
SODIUM SERPL-SCNC: 141 MMOL/L — SIGNIFICANT CHANGE UP (ref 135–145)
WBC # BLD: 6.2 K/UL — SIGNIFICANT CHANGE UP (ref 3.8–10.5)
WBC # FLD AUTO: 6.2 K/UL — SIGNIFICANT CHANGE UP (ref 3.8–10.5)

## 2017-05-13 RX ORDER — FUROSEMIDE 40 MG
40 TABLET ORAL ONCE
Qty: 0 | Refills: 0 | Status: COMPLETED | OUTPATIENT
Start: 2017-05-13 | End: 2017-05-13

## 2017-05-13 RX ADMIN — GABAPENTIN 300 MILLIGRAM(S): 400 CAPSULE ORAL at 17:20

## 2017-05-13 RX ADMIN — PANTOPRAZOLE SODIUM 40 MILLIGRAM(S): 20 TABLET, DELAYED RELEASE ORAL at 05:41

## 2017-05-13 RX ADMIN — Medication 325 MILLIGRAM(S): at 13:08

## 2017-05-13 RX ADMIN — Medication 100 MILLIGRAM(S): at 21:30

## 2017-05-13 RX ADMIN — SERTRALINE 50 MILLIGRAM(S): 25 TABLET, FILM COATED ORAL at 13:08

## 2017-05-13 RX ADMIN — ENOXAPARIN SODIUM 30 MILLIGRAM(S): 100 INJECTION SUBCUTANEOUS at 17:20

## 2017-05-13 RX ADMIN — Medication 100 MILLIGRAM(S): at 13:08

## 2017-05-13 RX ADMIN — CLOPIDOGREL BISULFATE 75 MILLIGRAM(S): 75 TABLET, FILM COATED ORAL at 13:07

## 2017-05-13 RX ADMIN — Medication 75 MICROGRAM(S): at 05:41

## 2017-05-13 RX ADMIN — Medication 600 MILLIGRAM(S): at 16:07

## 2017-05-13 RX ADMIN — ZOLPIDEM TARTRATE 5 MILLIGRAM(S): 10 TABLET ORAL at 22:55

## 2017-05-13 RX ADMIN — Medication 40 MILLIGRAM(S): at 16:05

## 2017-05-13 RX ADMIN — Medication 100 MILLIGRAM(S): at 05:41

## 2017-05-13 RX ADMIN — GABAPENTIN 300 MILLIGRAM(S): 400 CAPSULE ORAL at 05:41

## 2017-05-13 RX ADMIN — Medication 1 MILLIGRAM(S): at 21:30

## 2017-05-13 RX ADMIN — ZOLPIDEM TARTRATE 5 MILLIGRAM(S): 10 TABLET ORAL at 21:35

## 2017-05-13 RX ADMIN — Medication 500 MILLIGRAM(S): at 05:41

## 2017-05-13 RX ADMIN — Medication 325 MILLIGRAM(S): at 09:20

## 2017-05-13 RX ADMIN — CARVEDILOL PHOSPHATE 25 MILLIGRAM(S): 80 CAPSULE, EXTENDED RELEASE ORAL at 17:20

## 2017-05-13 RX ADMIN — Medication 325 MILLIGRAM(S): at 17:19

## 2017-05-13 RX ADMIN — Medication 500 MILLIGRAM(S): at 17:19

## 2017-05-13 RX ADMIN — ENOXAPARIN SODIUM 30 MILLIGRAM(S): 100 INJECTION SUBCUTANEOUS at 05:42

## 2017-05-13 RX ADMIN — Medication 1 MILLIGRAM(S): at 13:07

## 2017-05-13 NOTE — PROGRESS NOTE ADULT - SUBJECTIVE AND OBJECTIVE BOX
71yFemale    Diagnosis:  S/p Right Total Knee Replacement POD#2    Patient was seen and evaluated at bedside. Patient with no acute complaints.   Pain is well controlled with medications.   Denies CP/SOB, dyspnea, paresthesias, N/V/D, palpitations.     Vital Signs Last 24 Hrs  T(C): 37.8, Max: 37.8 (05-13 @ 05:48)  T(F): 100.1, Max: 100.1 (05-13 @ 05:48)  HR: 73 (62 - 73)  BP: 106/49 (94/52 - 119/54)  BP(mean): --  RR: 16 (16 - 16)  SpO2: 97% (97% - 100%)  I&O's Detail    I & Os for current day (as of 13 May 2017 11:32)  =============================================  IN:    Sodium Chloride 0.9% IV Bolus: 500 ml    Total IN: 500 ml  ---------------------------------------------  OUT:    Total OUT: 0 ml  ---------------------------------------------  Total NET: 500 ml      Physical Exam:    General: AAOx3, NAD, resting comfortably in bed.    Right KNEE:  Dressing is C/D/I. Skin is pink and warm. Staples intact. No erythema. SILT.  Wound with no drainage, healing well.   Lower extremity:  No calf tenderness, calves are soft. 2+pulses. NVI. 5/5 Strength of EHL/TA/gastrocnemius B/L.  Good capillary refill.                           8.0    6.2   )-----------( 149      ( 13 May 2017 08:05 )             25.0     05-13    141  |  108  |  16  ----------------------------<  106<H>  4.1   |  27  |  0.95    Ca    7.8<L>      13 May 2017 08:05        Impression:  71yFemale S/p Right Total Knee Replacement POD#2  Plan:  -  Pain management  -  Dvt prophylaxis with venodynes and Lovenox  -  Daily Physical Theapy:  WBAT  of right lower extremity  -  Discharge planning: Home Vs. Rehab pending Physical therapy eval.  -  Transfuse 2units PRBC today--acute blood loss anemia 71yFemale    Diagnosis:  S/p Right Total Knee Replacement POD#2    Patient was seen and evaluated at bedside. Patient with no acute complaints.   Pain is well controlled with medications.   Denies CP/SOB, dyspnea, paresthesias, N/V/D, palpitations.     Vital Signs Last 24 Hrs  T(C): 37.8, Max: 37.8 (05-13 @ 05:48)  T(F): 100.1, Max: 100.1 (05-13 @ 05:48)  HR: 73 (62 - 73)  BP: 106/49 (94/52 - 119/54)  BP(mean): --  RR: 16 (16 - 16)  SpO2: 97% (97% - 100%)  I&O's Detail    I & Os for current day (as of 13 May 2017 11:32)  =============================================  IN:    Sodium Chloride 0.9% IV Bolus: 500 ml    Total IN: 500 ml  ---------------------------------------------  OUT:    Total OUT: 0 ml  ---------------------------------------------  Total NET: 500 ml      Physical Exam:    General: AAOx3, NAD, resting comfortably in bed.    Right KNEE:  Dressing is C/D/I. Skin is pink and warm. Staples intact. No erythema. SILT.  Wound with no drainage, healing well.   Lower extremity:  No calf tenderness, calves are soft. 2+pulses. NVI. 5/5 Strength of EHL/TA/gastrocnemius B/L.  Good capillary refill.                           8.0    6.2   )-----------( 149      ( 13 May 2017 08:05 )             25.0     05-13    141  |  108  |  16  ----------------------------<  106<H>  4.1   |  27  |  0.95    Ca    7.8<L>      13 May 2017 08:05        Impression:  71yFemale S/p Right Total Knee Replacement POD#2  Plan:  -  Pain management  -  Dvt prophylaxis with venodynes and Lovenox  -  Daily Physical Theapy:  WBAT  of right lower extremity  -  Discharge planning: Home Vs. Rehab pending Physical therapy eval.  -  Transfuse 2units PRBC today--acute blood loss anemia  -  Dressing changed today

## 2017-05-14 LAB
ANION GAP SERPL CALC-SCNC: 5 MMOL/L — SIGNIFICANT CHANGE UP (ref 5–17)
APPEARANCE UR: CLEAR — SIGNIFICANT CHANGE UP
BILIRUB UR-MCNC: NEGATIVE — SIGNIFICANT CHANGE UP
BUN SERPL-MCNC: 14 MG/DL — SIGNIFICANT CHANGE UP (ref 7–18)
CALCIUM SERPL-MCNC: 8.8 MG/DL — SIGNIFICANT CHANGE UP (ref 8.4–10.5)
CHLORIDE SERPL-SCNC: 105 MMOL/L — SIGNIFICANT CHANGE UP (ref 96–108)
CO2 SERPL-SCNC: 30 MMOL/L — SIGNIFICANT CHANGE UP (ref 22–31)
COLOR SPEC: YELLOW — SIGNIFICANT CHANGE UP
CREAT SERPL-MCNC: 0.94 MG/DL — SIGNIFICANT CHANGE UP (ref 0.5–1.3)
DIFF PNL FLD: NEGATIVE — SIGNIFICANT CHANGE UP
GLUCOSE SERPL-MCNC: 107 MG/DL — HIGH (ref 70–99)
GLUCOSE UR QL: NEGATIVE — SIGNIFICANT CHANGE UP
HCT VFR BLD CALC: 30.9 % — LOW (ref 34.5–45)
HGB BLD-MCNC: 10.4 G/DL — LOW (ref 11.5–15.5)
KETONES UR-MCNC: NEGATIVE — SIGNIFICANT CHANGE UP
LEUKOCYTE ESTERASE UR-ACNC: ABNORMAL
MCHC RBC-ENTMCNC: 31.2 PG — SIGNIFICANT CHANGE UP (ref 27–34)
MCHC RBC-ENTMCNC: 33.8 GM/DL — SIGNIFICANT CHANGE UP (ref 32–36)
MCV RBC AUTO: 92.2 FL — SIGNIFICANT CHANGE UP (ref 80–100)
NITRITE UR-MCNC: NEGATIVE — SIGNIFICANT CHANGE UP
PH UR: 6 — SIGNIFICANT CHANGE UP (ref 5–8)
PLATELET # BLD AUTO: 155 K/UL — SIGNIFICANT CHANGE UP (ref 150–400)
POTASSIUM SERPL-MCNC: 3.9 MMOL/L — SIGNIFICANT CHANGE UP (ref 3.5–5.3)
POTASSIUM SERPL-SCNC: 3.9 MMOL/L — SIGNIFICANT CHANGE UP (ref 3.5–5.3)
PROT UR-MCNC: NEGATIVE — SIGNIFICANT CHANGE UP
RBC # BLD: 3.35 M/UL — LOW (ref 3.8–5.2)
RBC # FLD: 16 % — HIGH (ref 10.3–14.5)
SODIUM SERPL-SCNC: 140 MMOL/L — SIGNIFICANT CHANGE UP (ref 135–145)
SP GR SPEC: 1.01 — SIGNIFICANT CHANGE UP (ref 1.01–1.02)
UROBILINOGEN FLD QL: NEGATIVE — SIGNIFICANT CHANGE UP
WBC # BLD: 7.3 K/UL — SIGNIFICANT CHANGE UP (ref 3.8–10.5)
WBC # FLD AUTO: 7.3 K/UL — SIGNIFICANT CHANGE UP (ref 3.8–10.5)

## 2017-05-14 PROCEDURE — 71020: CPT | Mod: 26

## 2017-05-14 RX ADMIN — GABAPENTIN 300 MILLIGRAM(S): 400 CAPSULE ORAL at 05:26

## 2017-05-14 RX ADMIN — Medication 1 MILLIGRAM(S): at 21:23

## 2017-05-14 RX ADMIN — Medication 325 MILLIGRAM(S): at 12:15

## 2017-05-14 RX ADMIN — Medication 600 MILLIGRAM(S): at 17:01

## 2017-05-14 RX ADMIN — Medication 325 MILLIGRAM(S): at 07:50

## 2017-05-14 RX ADMIN — Medication 20 MILLIGRAM(S): at 05:26

## 2017-05-14 RX ADMIN — Medication 500 MILLIGRAM(S): at 05:26

## 2017-05-14 RX ADMIN — Medication 100 MILLIGRAM(S): at 21:23

## 2017-05-14 RX ADMIN — CARVEDILOL PHOSPHATE 25 MILLIGRAM(S): 80 CAPSULE, EXTENDED RELEASE ORAL at 18:15

## 2017-05-14 RX ADMIN — CARVEDILOL PHOSPHATE 25 MILLIGRAM(S): 80 CAPSULE, EXTENDED RELEASE ORAL at 05:26

## 2017-05-14 RX ADMIN — VALSARTAN 320 MILLIGRAM(S): 80 TABLET ORAL at 05:26

## 2017-05-14 RX ADMIN — GABAPENTIN 300 MILLIGRAM(S): 400 CAPSULE ORAL at 18:16

## 2017-05-14 RX ADMIN — Medication 100 MILLIGRAM(S): at 13:18

## 2017-05-14 RX ADMIN — Medication 1 MILLIGRAM(S): at 12:15

## 2017-05-14 RX ADMIN — CLOPIDOGREL BISULFATE 75 MILLIGRAM(S): 75 TABLET, FILM COATED ORAL at 12:15

## 2017-05-14 RX ADMIN — Medication 325 MILLIGRAM(S): at 17:01

## 2017-05-14 RX ADMIN — Medication 500 MILLIGRAM(S): at 18:15

## 2017-05-14 RX ADMIN — SERTRALINE 50 MILLIGRAM(S): 25 TABLET, FILM COATED ORAL at 12:15

## 2017-05-14 RX ADMIN — ENOXAPARIN SODIUM 30 MILLIGRAM(S): 100 INJECTION SUBCUTANEOUS at 18:16

## 2017-05-14 RX ADMIN — MAGNESIUM HYDROXIDE 30 MILLILITER(S): 400 TABLET, CHEWABLE ORAL at 13:18

## 2017-05-14 RX ADMIN — ZOLPIDEM TARTRATE 5 MILLIGRAM(S): 10 TABLET ORAL at 21:25

## 2017-05-14 RX ADMIN — PANTOPRAZOLE SODIUM 40 MILLIGRAM(S): 20 TABLET, DELAYED RELEASE ORAL at 05:29

## 2017-05-14 RX ADMIN — Medication 100 MILLIGRAM(S): at 05:26

## 2017-05-14 RX ADMIN — ENOXAPARIN SODIUM 30 MILLIGRAM(S): 100 INJECTION SUBCUTANEOUS at 05:27

## 2017-05-14 RX ADMIN — Medication 75 MICROGRAM(S): at 05:27

## 2017-05-14 RX ADMIN — Medication 650 MILLIGRAM(S): at 05:27

## 2017-05-14 RX ADMIN — ZOLPIDEM TARTRATE 5 MILLIGRAM(S): 10 TABLET ORAL at 23:00

## 2017-05-14 NOTE — PROGRESS NOTE ADULT - SUBJECTIVE AND OBJECTIVE BOX
71yFemale    Diagnosis:  S/p Right Total Knee Replacement POD#3    Patient was seen and evaluated at bedside. Patient with no acute complaints.   Pain is well controlled.  Denies CP/SOB, dyspnea, paresthesias, N/V/D, palpitations.     Vital Signs Last 24 Hrs  T(C): 37.3, Max: 38.5 (05-14 @ 05:47)  T(F): 99.1, Max: 101.3 (05-14 @ 05:47)  HR: 77 (63 - 87)  BP: 128/68 (113/49 - 138/59)  BP(mean): --  RR: 18 (16 - 169)  SpO2: 98% (96% - 100%)  I&O's Detail      Physical Exam:    General: AAOx3, NAD, resting comfortably in bed.    Right KNEE:  Dressing is C/D/I. Skin is pink and warm. Staples intact. No erythema. SILT.  Wound with no drainage, healing well.   Lower extremity:  No calf tenderness, calves are soft. 2+pulses. NVI. 5/5 Strength of EHL/TA/gastrocnemius B/L.  Good capillary refill.                           10.4   7.3   )-----------( 155      ( 14 May 2017 07:36 )             30.9     05-14    140  |  105  |  14  ----------------------------<  107<H>  3.9   |  30  |  0.94    Ca    8.8      14 May 2017 07:36        Impression:  71yFemale S/p Right Total Knee Replacement POD#3  Plan:  -  Pain management  -  Dvt prophylaxis with venodynes and Lovenox  -  Daily Physical Theapy:  WBAT   -  Discharge planning: Home Vs. Rehab pending Physical therapy eval.  -  Urinalysis and CXR--- post-op fever

## 2017-05-15 RX ORDER — FERROUS SULFATE 325(65) MG
1 TABLET ORAL
Qty: 0 | Refills: 0 | COMMUNITY

## 2017-05-15 RX ORDER — DOCUSATE SODIUM 100 MG
1 CAPSULE ORAL
Qty: 60 | Refills: 0 | OUTPATIENT
Start: 2017-05-15 | End: 2017-06-14

## 2017-05-15 RX ORDER — FOLIC ACID 0.8 MG
1 TABLET ORAL
Qty: 30 | Refills: 0 | OUTPATIENT
Start: 2017-05-15 | End: 2017-06-14

## 2017-05-15 RX ORDER — ENOXAPARIN SODIUM 100 MG/ML
40 INJECTION SUBCUTANEOUS
Qty: 12 | Refills: 0 | OUTPATIENT
Start: 2017-05-15 | End: 2017-05-27

## 2017-05-15 RX ORDER — ASCORBIC ACID 60 MG
1 TABLET,CHEWABLE ORAL
Qty: 60 | Refills: 0
Start: 2017-05-15 | End: 2017-06-14

## 2017-05-15 RX ORDER — FERROUS SULFATE 325(65) MG
1 TABLET ORAL
Qty: 60 | Refills: 0 | OUTPATIENT
Start: 2017-05-15 | End: 2017-06-14

## 2017-05-15 RX ADMIN — Medication 20 MILLIGRAM(S): at 05:45

## 2017-05-15 RX ADMIN — Medication 325 MILLIGRAM(S): at 12:15

## 2017-05-15 RX ADMIN — CARVEDILOL PHOSPHATE 25 MILLIGRAM(S): 80 CAPSULE, EXTENDED RELEASE ORAL at 05:45

## 2017-05-15 RX ADMIN — GABAPENTIN 300 MILLIGRAM(S): 400 CAPSULE ORAL at 05:46

## 2017-05-15 RX ADMIN — Medication 100 MILLIGRAM(S): at 14:03

## 2017-05-15 RX ADMIN — ENOXAPARIN SODIUM 30 MILLIGRAM(S): 100 INJECTION SUBCUTANEOUS at 05:46

## 2017-05-15 RX ADMIN — Medication 75 MICROGRAM(S): at 05:45

## 2017-05-15 RX ADMIN — ZOLPIDEM TARTRATE 5 MILLIGRAM(S): 10 TABLET ORAL at 21:38

## 2017-05-15 RX ADMIN — Medication 325 MILLIGRAM(S): at 16:58

## 2017-05-15 RX ADMIN — Medication 500 MILLIGRAM(S): at 17:01

## 2017-05-15 RX ADMIN — Medication 325 MILLIGRAM(S): at 08:21

## 2017-05-15 RX ADMIN — Medication 100 MILLIGRAM(S): at 05:45

## 2017-05-15 RX ADMIN — CLOPIDOGREL BISULFATE 75 MILLIGRAM(S): 75 TABLET, FILM COATED ORAL at 12:15

## 2017-05-15 RX ADMIN — GABAPENTIN 300 MILLIGRAM(S): 400 CAPSULE ORAL at 17:01

## 2017-05-15 RX ADMIN — ENOXAPARIN SODIUM 30 MILLIGRAM(S): 100 INJECTION SUBCUTANEOUS at 17:01

## 2017-05-15 RX ADMIN — Medication 500 MILLIGRAM(S): at 05:45

## 2017-05-15 RX ADMIN — Medication 1 MILLIGRAM(S): at 21:38

## 2017-05-15 RX ADMIN — SERTRALINE 50 MILLIGRAM(S): 25 TABLET, FILM COATED ORAL at 12:15

## 2017-05-15 RX ADMIN — Medication 1 MILLIGRAM(S): at 12:15

## 2017-05-15 RX ADMIN — VALSARTAN 320 MILLIGRAM(S): 80 TABLET ORAL at 05:46

## 2017-05-15 RX ADMIN — CARVEDILOL PHOSPHATE 25 MILLIGRAM(S): 80 CAPSULE, EXTENDED RELEASE ORAL at 17:01

## 2017-05-15 RX ADMIN — Medication 100 MILLIGRAM(S): at 21:38

## 2017-05-15 RX ADMIN — ZOLPIDEM TARTRATE 5 MILLIGRAM(S): 10 TABLET ORAL at 23:18

## 2017-05-15 RX ADMIN — Medication 600 MILLIGRAM(S): at 17:01

## 2017-05-15 RX ADMIN — PANTOPRAZOLE SODIUM 40 MILLIGRAM(S): 20 TABLET, DELAYED RELEASE ORAL at 05:45

## 2017-05-15 NOTE — PROGRESS NOTE ADULT - SUBJECTIVE AND OBJECTIVE BOX
71yFemale    Diagnosis:  S/p Right Total Knee Replacement POD#4    Patient was seen and evaluated at bedside. Patient with no acute complaints.   Pain is well controlled.  Denies CP/SOB, dyspnea, paresthesias, N/V/D, palpitations.     Vital Signs Last 24 Hrs  T(C): 37, Max: 37.3 (05-14 @ 08:38)  T(F): 98.6, Max: 99.1 (05-14 @ 08:38)  HR: 65 (58 - 77)  BP: 120/65 (107/47 - 128/68)  BP(mean): --  RR: 16 (16 - 17)  SpO2: 96% (96% - 99%)  I&O's Detail      Physical Exam:    General: AAOx3, NAD, resting comfortably in bed.    Right KNEE:  Dressing is C/D/I. Skin is pink and warm. Staples intact. No erythema. SILT.  Wound with no drainage, healing well.   Lower extremity:  No calf tenderness, calves are soft. 2+pulses. NVI. 5/5 Strength of EHL/TA/gastrocnemius B/L.  Good capillary refill.                           10.4   7.3   )-----------( 155      ( 14 May 2017 07:36 )             30.9     05-14    140  |  105  |  14  ----------------------------<  107<H>  3.9   |  30  |  0.94    Ca    8.8      14 May 2017 07:36        Impression:  71yFemale S/p Right Total Knee Replacement POD#4  Plan:  -  Pain management  -  Dvt prophylaxis with venodynes and Lovenox  -  Daily Physical Theapy:  WBAT    -  Discharge planning: Rehab  -  Dressing changed

## 2017-05-16 VITALS
OXYGEN SATURATION: 100 % | SYSTOLIC BLOOD PRESSURE: 112 MMHG | HEART RATE: 61 BPM | RESPIRATION RATE: 18 BRPM | TEMPERATURE: 99 F | DIASTOLIC BLOOD PRESSURE: 62 MMHG

## 2017-05-16 PROCEDURE — 85027 COMPLETE CBC AUTOMATED: CPT

## 2017-05-16 PROCEDURE — 86901 BLOOD TYPING SEROLOGIC RH(D): CPT

## 2017-05-16 PROCEDURE — 73562 X-RAY EXAM OF KNEE 3: CPT

## 2017-05-16 PROCEDURE — C1776: CPT

## 2017-05-16 PROCEDURE — 81001 URINALYSIS AUTO W/SCOPE: CPT

## 2017-05-16 PROCEDURE — C1713: CPT

## 2017-05-16 PROCEDURE — 86920 COMPATIBILITY TEST SPIN: CPT

## 2017-05-16 PROCEDURE — 88311 DECALCIFY TISSUE: CPT

## 2017-05-16 PROCEDURE — P9040: CPT

## 2017-05-16 PROCEDURE — 86850 RBC ANTIBODY SCREEN: CPT

## 2017-05-16 PROCEDURE — 97530 THERAPEUTIC ACTIVITIES: CPT

## 2017-05-16 PROCEDURE — 80048 BASIC METABOLIC PNL TOTAL CA: CPT

## 2017-05-16 PROCEDURE — 86900 BLOOD TYPING SEROLOGIC ABO: CPT

## 2017-05-16 PROCEDURE — 36430 TRANSFUSION BLD/BLD COMPNT: CPT

## 2017-05-16 PROCEDURE — 97110 THERAPEUTIC EXERCISES: CPT

## 2017-05-16 PROCEDURE — 97162 PT EVAL MOD COMPLEX 30 MIN: CPT

## 2017-05-16 PROCEDURE — 88305 TISSUE EXAM BY PATHOLOGIST: CPT

## 2017-05-16 PROCEDURE — 97116 GAIT TRAINING THERAPY: CPT

## 2017-05-16 PROCEDURE — 71046 X-RAY EXAM CHEST 2 VIEWS: CPT

## 2017-05-16 PROCEDURE — 97161 PT EVAL LOW COMPLEX 20 MIN: CPT

## 2017-05-16 RX ORDER — DOCUSATE SODIUM 100 MG
1 CAPSULE ORAL
Qty: 0 | Refills: 0 | DISCHARGE
Start: 2017-05-16

## 2017-05-16 RX ORDER — OXYCODONE HYDROCHLORIDE 5 MG/1
2 TABLET ORAL
Qty: 0 | Refills: 0 | DISCHARGE
Start: 2017-05-16

## 2017-05-16 RX ORDER — FOLIC ACID 0.8 MG
1 TABLET ORAL
Qty: 0 | Refills: 0 | DISCHARGE
Start: 2017-05-16

## 2017-05-16 RX ORDER — OXYCODONE HYDROCHLORIDE 5 MG/1
1 TABLET ORAL
Qty: 0 | Refills: 0 | DISCHARGE
Start: 2017-05-16

## 2017-05-16 RX ORDER — ASCORBIC ACID 60 MG
1 TABLET,CHEWABLE ORAL
Qty: 0 | Refills: 0 | DISCHARGE
Start: 2017-05-16

## 2017-05-16 RX ADMIN — GABAPENTIN 300 MILLIGRAM(S): 400 CAPSULE ORAL at 05:25

## 2017-05-16 RX ADMIN — Medication 1 MILLIGRAM(S): at 12:12

## 2017-05-16 RX ADMIN — ENOXAPARIN SODIUM 30 MILLIGRAM(S): 100 INJECTION SUBCUTANEOUS at 05:25

## 2017-05-16 RX ADMIN — Medication 325 MILLIGRAM(S): at 07:48

## 2017-05-16 RX ADMIN — PANTOPRAZOLE SODIUM 40 MILLIGRAM(S): 20 TABLET, DELAYED RELEASE ORAL at 05:25

## 2017-05-16 RX ADMIN — SERTRALINE 50 MILLIGRAM(S): 25 TABLET, FILM COATED ORAL at 12:12

## 2017-05-16 RX ADMIN — Medication 500 MILLIGRAM(S): at 05:25

## 2017-05-16 RX ADMIN — Medication 75 MICROGRAM(S): at 05:24

## 2017-05-16 RX ADMIN — Medication 100 MILLIGRAM(S): at 05:25

## 2017-05-16 RX ADMIN — CLOPIDOGREL BISULFATE 75 MILLIGRAM(S): 75 TABLET, FILM COATED ORAL at 12:12

## 2017-05-16 RX ADMIN — Medication 325 MILLIGRAM(S): at 12:12

## 2017-05-16 RX ADMIN — Medication 20 MILLIGRAM(S): at 05:25

## 2017-05-16 NOTE — PROGRESS NOTE ADULT - SUBJECTIVE AND OBJECTIVE BOX
71yFemale    Diagnosis:  S/p Right Total Knee Replacement POD#5    Patient was seen and evaluated at bedside. Patient with no acute complaints.   Pain is well controlled with meds.   Denies CP/SOB, dyspnea, paresthesias, N/V/D, palpitations.     Vital Signs Last 24 Hrs  T(C): 36.8, Max: 36.8 (05-16 @ 06:03)  T(F): 98.2, Max: 98.2 (05-16 @ 06:03)  HR: 65 (60 - 65)  BP: 116/65 (109/52 - 120/59)  BP(mean): --  RR: 16 (16 - 16)  SpO2: 98% (96% - 99%)  I&O's Detail      Physical Exam:    General: AAOx3, NAD, resting comfortably in bed.    Right KNEE:  Dressing is C/D/I. Skin is pink and warm. Staples intact. No erythema. SILT.  Wound with no drainage, healing well.   Lower extremity:  No calf tenderness, calves are soft. 2+pulses. NVI. 5/5 Strength of EHL/TA/gastrocnemius B/L.  Good capillary refill.               Impression:  71yFemale S/p Right Total Knee Replacement POD#5  Plan:  -  Pain management  -  Dvt prophylaxis with venodynes and Lovenox  -  Daily Physical Theapy:  WBAT  -  Discharge planning: Home Vs. Rehab. Patient was set up for home but would like to go to rehab. She states she lives at home alone and lives on 3rd floor.

## 2017-05-16 NOTE — PROGRESS NOTE ADULT - PROBLEM SELECTOR PROBLEM 1
Primary osteoarthritis of right knee
Acute post-operative pain
Status post right knee replacement

## 2017-05-19 DIAGNOSIS — I25.10 ATHEROSCLEROTIC HEART DISEASE OF NATIVE CORONARY ARTERY WITHOUT ANGINA PECTORIS: ICD-10-CM

## 2017-05-19 DIAGNOSIS — I10 ESSENTIAL (PRIMARY) HYPERTENSION: ICD-10-CM

## 2017-05-19 DIAGNOSIS — D64.9 ANEMIA, UNSPECIFIED: ICD-10-CM

## 2017-05-19 DIAGNOSIS — I25.2 OLD MYOCARDIAL INFARCTION: ICD-10-CM

## 2017-05-19 DIAGNOSIS — M17.11 UNILATERAL PRIMARY OSTEOARTHRITIS, RIGHT KNEE: ICD-10-CM

## 2017-05-19 DIAGNOSIS — G89.18 OTHER ACUTE POSTPROCEDURAL PAIN: ICD-10-CM

## 2017-05-19 DIAGNOSIS — F32.9 MAJOR DEPRESSIVE DISORDER, SINGLE EPISODE, UNSPECIFIED: ICD-10-CM

## 2017-05-19 DIAGNOSIS — E78.5 HYPERLIPIDEMIA, UNSPECIFIED: ICD-10-CM

## 2017-05-19 DIAGNOSIS — Z90.49 ACQUIRED ABSENCE OF OTHER SPECIFIED PARTS OF DIGESTIVE TRACT: ICD-10-CM

## 2017-05-19 DIAGNOSIS — E03.9 HYPOTHYROIDISM, UNSPECIFIED: ICD-10-CM

## 2017-05-19 DIAGNOSIS — Z87.891 PERSONAL HISTORY OF NICOTINE DEPENDENCE: ICD-10-CM

## 2017-06-28 ENCOUNTER — APPOINTMENT (OUTPATIENT)
Dept: PLASTIC SURGERY | Facility: CLINIC | Age: 72
End: 2017-06-28

## 2017-06-28 VITALS
BODY MASS INDEX: 28.89 KG/M2 | DIASTOLIC BLOOD PRESSURE: 72 MMHG | HEIGHT: 61 IN | WEIGHT: 153 LBS | TEMPERATURE: 97.4 F | SYSTOLIC BLOOD PRESSURE: 116 MMHG | HEART RATE: 50 BPM

## 2017-06-28 DIAGNOSIS — Z86.39 PERSONAL HISTORY OF OTHER ENDOCRINE, NUTRITIONAL AND METABOLIC DISEASE: ICD-10-CM

## 2017-06-28 DIAGNOSIS — Z86.79 PERSONAL HISTORY OF OTHER DISEASES OF THE CIRCULATORY SYSTEM: ICD-10-CM

## 2017-06-28 DIAGNOSIS — Z80.1 FAMILY HISTORY OF MALIGNANT NEOPLASM OF TRACHEA, BRONCHUS AND LUNG: ICD-10-CM

## 2017-06-28 DIAGNOSIS — Z87.39 PERSONAL HISTORY OF OTHER DISEASES OF THE MUSCULOSKELETAL SYSTEM AND CONNECTIVE TISSUE: ICD-10-CM

## 2017-06-28 DIAGNOSIS — Z63.4 DISAPPEARANCE AND DEATH OF FAMILY MEMBER: ICD-10-CM

## 2017-06-28 DIAGNOSIS — Z78.9 OTHER SPECIFIED HEALTH STATUS: ICD-10-CM

## 2017-06-28 DIAGNOSIS — Z80.3 FAMILY HISTORY OF MALIGNANT NEOPLASM OF BREAST: ICD-10-CM

## 2017-06-28 DIAGNOSIS — Z87.891 PERSONAL HISTORY OF NICOTINE DEPENDENCE: ICD-10-CM

## 2017-06-28 DIAGNOSIS — K21.9 GASTRO-ESOPHAGEAL REFLUX DISEASE W/OUT ESOPHAGITIS: ICD-10-CM

## 2017-06-28 DIAGNOSIS — F15.90 OTHER STIMULANT USE, UNSPECIFIED, UNCOMPLICATED: ICD-10-CM

## 2017-06-28 SDOH — SOCIAL STABILITY - SOCIAL INSECURITY: DISSAPEARANCE AND DEATH OF FAMILY MEMBER: Z63.4

## 2017-08-02 ENCOUNTER — APPOINTMENT (OUTPATIENT)
Dept: PLASTIC SURGERY | Facility: CLINIC | Age: 72
End: 2017-08-02
Payer: MEDICARE

## 2017-08-02 DIAGNOSIS — S81.009A UNSPECIFIED OPEN WOUND, UNSPECIFIED KNEE, INITIAL ENCOUNTER: ICD-10-CM

## 2017-08-02 DIAGNOSIS — Z96.651 PRESENCE OF RIGHT ARTIFICIAL KNEE JOINT: ICD-10-CM

## 2017-08-02 PROCEDURE — 99213 OFFICE O/P EST LOW 20 MIN: CPT

## 2017-08-10 PROBLEM — S81.009A OPEN KNEE WOUND: Status: ACTIVE | Noted: 2017-06-29

## 2017-08-10 PROBLEM — Z96.651 STATUS POST TOTAL RIGHT KNEE REPLACEMENT: Status: ACTIVE | Noted: 2017-06-29

## 2018-10-17 NOTE — H&P PST ADULT - NS PRO ABUSE SCREEN SUSPICION NEGLECT YN
Problem: Patient Care Overview  Goal: Plan of Care/Patient Progress Review  Outcome: Improving  A&O x4. VSS. Lung sounds diminished, chest tube to suction with chest tube #1 has air leak continuous and chest tube #2 has air leak intermittent with cough. Crepitus noted near onq pump insertion site. Bowel sounds hypoactive in all 4 quadrants ,adequate urine output, thoracotomy Incision with dressing and small amount of drainage, Ambulates standby assist. Tolerating regular diet. Pain controlled by PRN dilaudid and tylenol. Pt. Has no symptoms of depression noted.        no

## 2019-01-27 NOTE — ASU PATIENT PROFILE, ADULT - NS TRANSFER PATIENT BELONGINGS
FIOR met with Pt, his sister Sera Carrero and [id number] Pablo Arreguin with an introduction and explanation of role  Pt reported being from [facility] and will be returning there upon d/c  Pt's sister reported they will be transporting the Pt back and will have his medication filled en route back to [facility]  FIOR made RN aware  Pt denied any d/c needs at this time  Clothing

## 2021-08-05 ENCOUNTER — INPATIENT (INPATIENT)
Facility: HOSPITAL | Age: 76
LOS: 3 days | Discharge: HOME | DRG: 580 | End: 2021-08-09
Attending: INTERNAL MEDICINE | Admitting: INTERNAL MEDICINE
Payer: COMMERCIAL

## 2021-08-05 VITALS
WEIGHT: 167.99 LBS | TEMPERATURE: 98 F | HEART RATE: 74 BPM | SYSTOLIC BLOOD PRESSURE: 128 MMHG | RESPIRATION RATE: 18 BRPM | DIASTOLIC BLOOD PRESSURE: 73 MMHG | HEIGHT: 59 IN

## 2021-08-05 DIAGNOSIS — E03.9 HYPOTHYROIDISM, UNSPECIFIED: ICD-10-CM

## 2021-08-05 DIAGNOSIS — D50.8 OTHER IRON DEFICIENCY ANEMIAS: ICD-10-CM

## 2021-08-05 DIAGNOSIS — K81.9 CHOLECYSTITIS, UNSPECIFIED: Chronic | ICD-10-CM

## 2021-08-05 DIAGNOSIS — Z98.89 OTHER SPECIFIED POSTPROCEDURAL STATES: Chronic | ICD-10-CM

## 2021-08-05 DIAGNOSIS — Z96.651 PRESENCE OF RIGHT ARTIFICIAL KNEE JOINT: Chronic | ICD-10-CM

## 2021-08-05 DIAGNOSIS — I10 ESSENTIAL (PRIMARY) HYPERTENSION: ICD-10-CM

## 2021-08-05 DIAGNOSIS — L03.031 CELLULITIS OF RIGHT TOE: ICD-10-CM

## 2021-08-05 DIAGNOSIS — I25.119 ATHEROSCLEROTIC HEART DISEASE OF NATIVE CORONARY ARTERY WITH UNSPECIFIED ANGINA PECTORIS: ICD-10-CM

## 2021-08-05 DIAGNOSIS — L02.611 CUTANEOUS ABSCESS OF RIGHT FOOT: ICD-10-CM

## 2021-08-05 DIAGNOSIS — E78.5 HYPERLIPIDEMIA, UNSPECIFIED: ICD-10-CM

## 2021-08-05 LAB
ALBUMIN SERPL ELPH-MCNC: 3.4 G/DL — LOW (ref 3.5–5)
ALP SERPL-CCNC: 106 U/L — SIGNIFICANT CHANGE UP (ref 40–120)
ALT FLD-CCNC: 56 U/L DA — SIGNIFICANT CHANGE UP (ref 10–60)
ANION GAP SERPL CALC-SCNC: 4 MMOL/L — LOW (ref 5–17)
APPEARANCE UR: CLEAR — SIGNIFICANT CHANGE UP
APTT BLD: 31.5 SEC — SIGNIFICANT CHANGE UP (ref 27.5–35.5)
AST SERPL-CCNC: 28 U/L — SIGNIFICANT CHANGE UP (ref 10–40)
BACTERIA # UR AUTO: ABNORMAL /HPF
BASOPHILS # BLD AUTO: 0.03 K/UL — SIGNIFICANT CHANGE UP (ref 0–0.2)
BASOPHILS NFR BLD AUTO: 0.4 % — SIGNIFICANT CHANGE UP (ref 0–2)
BILIRUB SERPL-MCNC: 0.4 MG/DL — SIGNIFICANT CHANGE UP (ref 0.2–1.2)
BILIRUB UR-MCNC: NEGATIVE — SIGNIFICANT CHANGE UP
BUN SERPL-MCNC: 18 MG/DL — SIGNIFICANT CHANGE UP (ref 7–18)
CALCIUM SERPL-MCNC: 8.6 MG/DL — SIGNIFICANT CHANGE UP (ref 8.4–10.5)
CHLORIDE SERPL-SCNC: 106 MMOL/L — SIGNIFICANT CHANGE UP (ref 96–108)
CO2 SERPL-SCNC: 27 MMOL/L — SIGNIFICANT CHANGE UP (ref 22–31)
COLOR SPEC: YELLOW — SIGNIFICANT CHANGE UP
CREAT SERPL-MCNC: 0.79 MG/DL — SIGNIFICANT CHANGE UP (ref 0.5–1.3)
DIFF PNL FLD: NEGATIVE — SIGNIFICANT CHANGE UP
EOSINOPHIL # BLD AUTO: 0.27 K/UL — SIGNIFICANT CHANGE UP (ref 0–0.5)
EOSINOPHIL NFR BLD AUTO: 3.2 % — SIGNIFICANT CHANGE UP (ref 0–6)
EPI CELLS # UR: ABNORMAL /HPF
GLUCOSE SERPL-MCNC: 105 MG/DL — HIGH (ref 70–99)
GLUCOSE UR QL: NEGATIVE — SIGNIFICANT CHANGE UP
HCT VFR BLD CALC: 33.7 % — LOW (ref 34.5–45)
HGB BLD-MCNC: 10.7 G/DL — LOW (ref 11.5–15.5)
IMM GRANULOCYTES NFR BLD AUTO: 0.4 % — SIGNIFICANT CHANGE UP (ref 0–1.5)
INR BLD: 1.04 RATIO — SIGNIFICANT CHANGE UP (ref 0.88–1.16)
KETONES UR-MCNC: NEGATIVE — SIGNIFICANT CHANGE UP
LACTATE SERPL-SCNC: 0.7 MMOL/L — SIGNIFICANT CHANGE UP (ref 0.7–2)
LEUKOCYTE ESTERASE UR-ACNC: ABNORMAL
LYMPHOCYTES # BLD AUTO: 0.81 K/UL — LOW (ref 1–3.3)
LYMPHOCYTES # BLD AUTO: 9.6 % — LOW (ref 13–44)
MCHC RBC-ENTMCNC: 29.7 PG — SIGNIFICANT CHANGE UP (ref 27–34)
MCHC RBC-ENTMCNC: 31.8 GM/DL — LOW (ref 32–36)
MCV RBC AUTO: 93.6 FL — SIGNIFICANT CHANGE UP (ref 80–100)
MONOCYTES # BLD AUTO: 1.1 K/UL — HIGH (ref 0–0.9)
MONOCYTES NFR BLD AUTO: 13 % — SIGNIFICANT CHANGE UP (ref 2–14)
NEUTROPHILS # BLD AUTO: 6.21 K/UL — SIGNIFICANT CHANGE UP (ref 1.8–7.4)
NEUTROPHILS NFR BLD AUTO: 73.4 % — SIGNIFICANT CHANGE UP (ref 43–77)
NITRITE UR-MCNC: NEGATIVE — SIGNIFICANT CHANGE UP
NRBC # BLD: 0 /100 WBCS — SIGNIFICANT CHANGE UP (ref 0–0)
PH UR: 6 — SIGNIFICANT CHANGE UP (ref 5–8)
PLATELET # BLD AUTO: 240 K/UL — SIGNIFICANT CHANGE UP (ref 150–400)
POTASSIUM SERPL-MCNC: 4.3 MMOL/L — SIGNIFICANT CHANGE UP (ref 3.5–5.3)
POTASSIUM SERPL-SCNC: 4.3 MMOL/L — SIGNIFICANT CHANGE UP (ref 3.5–5.3)
PROT SERPL-MCNC: 7.3 G/DL — SIGNIFICANT CHANGE UP (ref 6–8.3)
PROT UR-MCNC: NEGATIVE — SIGNIFICANT CHANGE UP
PROTHROM AB SERPL-ACNC: 12.4 SEC — SIGNIFICANT CHANGE UP (ref 10.6–13.6)
RBC # BLD: 3.6 M/UL — LOW (ref 3.8–5.2)
RBC # FLD: 13.5 % — SIGNIFICANT CHANGE UP (ref 10.3–14.5)
RBC CASTS # UR COMP ASSIST: ABNORMAL /HPF (ref 0–2)
SARS-COV-2 RNA SPEC QL NAA+PROBE: SIGNIFICANT CHANGE UP
SODIUM SERPL-SCNC: 137 MMOL/L — SIGNIFICANT CHANGE UP (ref 135–145)
SP GR SPEC: 1.01 — SIGNIFICANT CHANGE UP (ref 1.01–1.02)
UROBILINOGEN FLD QL: NEGATIVE — SIGNIFICANT CHANGE UP
WBC # BLD: 8.45 K/UL — SIGNIFICANT CHANGE UP (ref 3.8–10.5)
WBC # FLD AUTO: 8.45 K/UL — SIGNIFICANT CHANGE UP (ref 3.8–10.5)
WBC UR QL: ABNORMAL /HPF (ref 0–5)

## 2021-08-05 PROCEDURE — 71045 X-RAY EXAM CHEST 1 VIEW: CPT | Mod: 26

## 2021-08-05 PROCEDURE — 99285 EMERGENCY DEPT VISIT HI MDM: CPT

## 2021-08-05 PROCEDURE — 93010 ELECTROCARDIOGRAM REPORT: CPT

## 2021-08-05 PROCEDURE — 93971 EXTREMITY STUDY: CPT | Mod: 26,RT

## 2021-08-05 RX ORDER — FERROUS SULFATE 325(65) MG
325 TABLET ORAL THREE TIMES A DAY
Refills: 0 | Status: DISCONTINUED | OUTPATIENT
Start: 2021-08-05 | End: 2021-08-09

## 2021-08-05 RX ORDER — AMPICILLIN SODIUM AND SULBACTAM SODIUM 250; 125 MG/ML; MG/ML
3 INJECTION, POWDER, FOR SUSPENSION INTRAMUSCULAR; INTRAVENOUS EVERY 6 HOURS
Refills: 0 | Status: DISCONTINUED | OUTPATIENT
Start: 2021-08-06 | End: 2021-08-07

## 2021-08-05 RX ORDER — SODIUM CHLORIDE 9 MG/ML
1000 INJECTION INTRAMUSCULAR; INTRAVENOUS; SUBCUTANEOUS ONCE
Refills: 0 | Status: COMPLETED | OUTPATIENT
Start: 2021-08-05 | End: 2021-08-05

## 2021-08-05 RX ORDER — AMPICILLIN SODIUM AND SULBACTAM SODIUM 250; 125 MG/ML; MG/ML
3 INJECTION, POWDER, FOR SUSPENSION INTRAMUSCULAR; INTRAVENOUS ONCE
Refills: 0 | Status: COMPLETED | OUTPATIENT
Start: 2021-08-05 | End: 2021-08-05

## 2021-08-05 RX ORDER — LEVOTHYROXINE SODIUM 125 MCG
75 TABLET ORAL DAILY
Refills: 0 | Status: DISCONTINUED | OUTPATIENT
Start: 2021-08-05 | End: 2021-08-09

## 2021-08-05 RX ORDER — PANTOPRAZOLE SODIUM 20 MG/1
40 TABLET, DELAYED RELEASE ORAL
Refills: 0 | Status: DISCONTINUED | OUTPATIENT
Start: 2021-08-05 | End: 2021-08-09

## 2021-08-05 RX ORDER — ZOLPIDEM TARTRATE 10 MG/1
5 TABLET ORAL AT BEDTIME
Refills: 0 | Status: DISCONTINUED | OUTPATIENT
Start: 2021-08-05 | End: 2021-08-09

## 2021-08-05 RX ORDER — CARVEDILOL PHOSPHATE 80 MG/1
25 CAPSULE, EXTENDED RELEASE ORAL EVERY 12 HOURS
Refills: 0 | Status: DISCONTINUED | OUTPATIENT
Start: 2021-08-05 | End: 2021-08-09

## 2021-08-05 RX ORDER — AMPICILLIN SODIUM AND SULBACTAM SODIUM 250; 125 MG/ML; MG/ML
INJECTION, POWDER, FOR SUSPENSION INTRAMUSCULAR; INTRAVENOUS
Refills: 0 | Status: DISCONTINUED | OUTPATIENT
Start: 2021-08-05 | End: 2021-08-07

## 2021-08-05 RX ORDER — SERTRALINE 25 MG/1
50 TABLET, FILM COATED ORAL DAILY
Refills: 0 | Status: DISCONTINUED | OUTPATIENT
Start: 2021-08-05 | End: 2021-08-09

## 2021-08-05 RX ORDER — VALSARTAN 80 MG/1
1 TABLET ORAL
Qty: 0 | Refills: 0 | DISCHARGE

## 2021-08-05 RX ORDER — CEFTRIAXONE 500 MG/1
1000 INJECTION, POWDER, FOR SOLUTION INTRAMUSCULAR; INTRAVENOUS ONCE
Refills: 0 | Status: COMPLETED | OUTPATIENT
Start: 2021-08-05 | End: 2021-08-05

## 2021-08-05 RX ORDER — ASCORBIC ACID 60 MG
500 TABLET,CHEWABLE ORAL DAILY
Refills: 0 | Status: DISCONTINUED | OUTPATIENT
Start: 2021-08-05 | End: 2021-08-09

## 2021-08-05 RX ORDER — GABAPENTIN 400 MG/1
300 CAPSULE ORAL
Refills: 0 | Status: DISCONTINUED | OUTPATIENT
Start: 2021-08-05 | End: 2021-08-09

## 2021-08-05 RX ORDER — KETOROLAC TROMETHAMINE 30 MG/ML
15 SYRINGE (ML) INJECTION ONCE
Refills: 0 | Status: DISCONTINUED | OUTPATIENT
Start: 2021-08-05 | End: 2021-08-05

## 2021-08-05 RX ORDER — HEPARIN SODIUM 5000 [USP'U]/ML
5000 INJECTION INTRAVENOUS; SUBCUTANEOUS EVERY 8 HOURS
Refills: 0 | Status: DISCONTINUED | OUTPATIENT
Start: 2021-08-05 | End: 2021-08-09

## 2021-08-05 RX ADMIN — AMPICILLIN SODIUM AND SULBACTAM SODIUM 200 GRAM(S): 250; 125 INJECTION, POWDER, FOR SUSPENSION INTRAMUSCULAR; INTRAVENOUS at 18:56

## 2021-08-05 RX ADMIN — SODIUM CHLORIDE 1000 MILLILITER(S): 9 INJECTION INTRAMUSCULAR; INTRAVENOUS; SUBCUTANEOUS at 17:26

## 2021-08-05 RX ADMIN — Medication 325 MILLIGRAM(S): at 21:47

## 2021-08-05 RX ADMIN — CEFTRIAXONE 100 MILLIGRAM(S): 500 INJECTION, POWDER, FOR SOLUTION INTRAMUSCULAR; INTRAVENOUS at 17:24

## 2021-08-05 RX ADMIN — HEPARIN SODIUM 5000 UNIT(S): 5000 INJECTION INTRAVENOUS; SUBCUTANEOUS at 21:48

## 2021-08-05 NOTE — H&P ADULT - NSICDXFAMILYHX_GEN_ALL_CORE_FT
FAMILY HISTORY:  Family history of cardiomyopathy, Mother  at  63 y.o    Father  Still living? No  Family history of lung cancer, Age at diagnosis: Age Unknown    Mother  Still living? No  Family history of coronary artery disease, Age at diagnosis: Age Unknown    
normal

## 2021-08-05 NOTE — H&P ADULT - ASSESSMENT
Patient is a 76F   female from home with PMHx of CAD, HTN, HLD, hypothyroidism, who came to the ED for swelling and tenderness on his RLE. At the ED found with erythema, swelling of the R big toe extending to bellow the knee. No signs of SIRS.  Pt admitted for cellulitis     Primary team please call patients pharmacy to confirm patient medication

## 2021-08-05 NOTE — CONSULT NOTE ADULT - ASSESSMENT
A: R great toe abscess     P:  Patient evaluated, chart reviewed  Labs reviewed   Xrays pending   MRI pending   Cu;tures pending   Verbal consent was obtained   Affected area was prepped with Betadine  Using a sterile 11blade, a small incision was made to the R great toe down to and including subcutaneous tissue  The wound was flushed with saline/betadine mixture and packed with Iodoform  Dressed wound with DSD, ACE  Patient to keep dressing clean, dry and intact   Patient to stay heel WB R foot   Recommend ID consult for IV Abx  Discussed with Attending Dr. Cruz  A: R great toe abscess     P:  Patient evaluated, chart reviewed  Labs reviewed   Xrays pending    Cu;tures pending   Recommend MRI per medicine   Verbal consent was obtained   Affected area was prepped with Betadine  Using a sterile 11blade, a small incision was made to the R great toe down to and including subcutaneous tissue  The wound was flushed with saline/betadine mixture and packed with Iodoform  Dressed wound with DSD, ACE  Patient to keep dressing clean, dry and intact   Patient to stay heel WB R foot   Recommend ID consult for IV Abx  Discussed with Attending Dr. Cruz  A: R great toe abscess     P:  Patient evaluated, chart reviewed  Labs reviewed   Xrays pending    Cultures pending   Ordered ESR/CRP  Recommend MRI per medicine   Verbal consent was obtained   Affected area was prepped with Betadine  Using a sterile 11blade, a small incision was made to the R great toe down to and including subcutaneous tissue  The wound was flushed with saline/betadine mixture and packed with Iodoform  Dressed wound with DSD, ACE  Patient to keep dressing clean, dry and intact   Patient to stay heel WB R foot   Recommend ID consult for IV Abx  Discussed with Attending Dr. Cruz

## 2021-08-05 NOTE — H&P ADULT - NSICDXPASTMEDICALHX_GEN_ALL_CORE_FT
PAST MEDICAL HISTORY:  CAD (coronary artery disease)     History of hypertension     Hyperlipidemia     Hypothyroid

## 2021-08-05 NOTE — H&P ADULT - PROBLEM SELECTOR PLAN 3
continue with home medications continue with home medications  hold BP medications for now d/t soft BP

## 2021-08-05 NOTE — H&P ADULT - ATTENDING COMMENTS
Patient is a 76F   female from home with PMHx of CAD, HTN, HLD, hypothyroidism, who came to the ED for swelling and tenderness on her RLE. Pt endorses that a week ago she was in [place] and developed a sore on the dorsal surface of her first big toe. She popped it and started draining fluids. Few days later the affected area developed redness extended to her calf, associated with swelling and tenderness. Also endorses cough for 2 days, no sputum production. Denies fever, chills, SOB        assessment  --  right foot cellulitis with ulcer, r/o osteo, h/o CAD, HTN, HLD, hypothyroidism,    plan  --  admit to med, unasyn, cont preadmit home meds, gi and dvt profilaxis,  cbc, bmp, mg, phos, lipids, tsh, bld cx, ua, ucx, wound cx      mri right foot      id cons  podiatry f/u

## 2021-08-05 NOTE — H&P ADULT - HISTORY OF PRESENT ILLNESS
Patient is a 76F with PMHx of CAD, HTN, HLD, hypothyroidism , anemia, PSH total r knee replacement  presenting with chief complaint of right leg swelling. Patient spent the past few months in the [place], just returning from the [place] today. 7 days ago, she developed a pimple on the dorsal surface of her first big toe. She popped it and started draining fluids. Blister returned and surrounding area started getting red. 5 days ago, she noted that the redness extended to her calf, and started getting swollen. Denied any fever, chills. Has cough for 2 days, no sputum production, SOB.  Denies nausea, vomiting, diarrhea, abdominal pain, SOB, chest pain, PRUITT, palpitations, dizziness, headache, cough, wheezing.       Ed course : patient received Rocephin  1gr X1, and 1 liter bolus , was seen by podietry , recommended no MRI needed given low suspicion for osteomyelitis       Patient is a 76F   female from home with PMHx of CAD, HTN, HLD, hypothyroidism, who came to the ED for swelling and tenderness on his RLE. Pt endorses that a week ago she was in [place] and developed a sore on the dorsal surface of her first big toe. She popped it and started draining fluids. Few days later the affected area developed redness extended to her calf, associated with swelling and tenderness. Also endorses cough for 2 days, no sputum production. Denies fever, chills, SOB            Patient is a 76F   female from home with PMHx of CAD, HTN, HLD, hypothyroidism, who came to the ED for swelling and tenderness on her RLE. Pt endorses that a week ago she was in [place] and developed a sore on the dorsal surface of her first big toe. She popped it and started draining fluids. Few days later the affected area developed redness extended to her calf, associated with swelling and tenderness. Also endorses cough for 2 days, no sputum production. Denies fever, chills, SOB

## 2021-08-05 NOTE — ED PROVIDER NOTE - CLINICAL SUMMARY MEDICAL DECISION MAKING FREE TEXT BOX
Patient is a 76 year old female with PMHx of CAD, HTn, HLD, presenting with suspected cellulitis of her right lower extremity. US doppler ordered to rule out DVT. Podiatry consulted for further evaluation. PO vs IV antibiotics.

## 2021-08-05 NOTE — ED PROVIDER NOTE - PROGRESS NOTE DETAILS
Hein:  Pt received in signout from Dr. Bergman to f/u Doppler r/o DVT and podiatry recommendations--Doppler neg for DVT and podiatry advised admission.   Informed Dr. Luevano and he accepts admission.

## 2021-08-05 NOTE — ED PROVIDER NOTE - OBJECTIVE STATEMENT
Patient is a 76F with PMHx of CAD, HTN, HLD, presenting with chief complaint of right leg swelling. Patient spent the past few months in the DR, just returning from the DR today. 7 days ago, she developed a pimple on the dorsal surface of her first big toe. She popped it and started draining fluids. Blister returned and surrounding area started getting red. 5 days ago, she noted that the redness extended to her calf, and started getting swollen. Denied any fever, chills. Has cough for 2 days, no sputum production, SOB. Denied any chest pain, n/v/d.

## 2021-08-05 NOTE — ED ADULT TRIAGE NOTE - CHIEF COMPLAINT QUOTE
came from DR today - R lower calf redness /swelling for 4 days and R toes redness x 6 days , denies any fever .

## 2021-08-05 NOTE — H&P ADULT - PROBLEM SELECTOR PLAN 1
-Venous doppler showed no eivdence of venous thrombosis  -Podiatry evaluated pt and drained abscess on the R great toe down to sc tissue.   -s/p single dose of Ceftriaxone  -started on Unasyn -Venous doppler showed no evidence of venous thrombosis  -Podiatry evaluated pt and drained abscess on the R great toe down to sc tissue. Cultures from the wound were obtained  -s/p single dose of Ceftriaxone  -started on Unasyn  -follow cultures and results of X ray   -Consult ID (Dr. Garces) -Venous doppler showed no evidence of venous thrombosis  -Podiatry evaluated pt and drained abscess on the R great toe down to sc tissue. Cultures from the wound were obtained  -s/p single dose of Ceftriaxone  -started on Unasyn  -follow cultures and results of X ray   -MRI foot requested  -Consult ID (Dr. Garces)

## 2021-08-05 NOTE — H&P ADULT - NSICDXPASTSURGICALHX_GEN_ALL_CORE_FT
PAST SURGICAL HISTORY:  Cholecystitis s/p cholecystectomy    H/O total knee replacement, right     History of angioplasty 2002: x

## 2021-08-05 NOTE — ED PROVIDER NOTE - ATTENDING CONTRIBUTION TO CARE
76F with PMHx of CAD, HTN, HLD  worsening redness swelling starting from rt hallux toe  ttp calf  podiatry eval, admission for cellulitis

## 2021-08-05 NOTE — ED ADULT NURSE NOTE - NSIMPLEMENTINTERV_GEN_ALL_ED
Implemented All Universal Safety Interventions:  [place] to call system. Call bell, personal items and telephone within reach. Instruct patient to call for assistance. Room bathroom lighting operational. Non-slip footwear when patient is off stretcher. Physically safe environment: no spills, clutter or unnecessary equipment. Stretcher in lowest position, wheels locked, appropriate side rails in place.

## 2021-08-05 NOTE — ED PROVIDER NOTE - PHYSICAL EXAMINATION
Punch Size In Mm: 2.5 General - NAD, laying in bed, Vietnamese speaking, well nourished, well developed  ENT - Nonicteric sclerae, PERRLA, EOMI. Oropharynx clear. Moist mucous membranes. Conjunctivae appear well perfused.   Neck - No noticeable or palpable swelling, redness or rash around throat or on face  Lymph Nodes - No lymphadenopathy  Cardiovascular - +s1/s2 regular, no murmurs  Lungs - Clear to ascultation, no use of accessory muscles, no crackles or wheezes.  Skin - No rashes, skin warm and dry, no erythematous areas  Abdomen - Normal bowel sounds, abdomen soft and nontender  Extremities - Right leg with streaky erythema, swelling, warmth. On the dorsal surface of the right first digit of the right foot with an ulcer, surrounding by a dull base, surrounding erythema, some extending to the calf.  Musculoskeletal - 5/5 strength, normal range of motion, no swollen or erythematous  joints.  Neurological – Alert and oriented x 3, CN 2-12 grossly intact.

## 2021-08-05 NOTE — CONSULT NOTE ADULT - SUBJECTIVE AND OBJECTIVE BOX
Podiatry HPI: Patient presents to the ED for R foot cellulitis. Patient states she went to the [place] where she had a blister on her R great toe. Patient states as the week went on the blister got worse and worse. Patient states doctor from  gave her cream to apply but has not helped. Patient states R great toe is painful to touch and she is unable to walk. Patient denies any trauma to area. Denies N/V/F/C/SOB. No other pedal complaints.         Patient is a 76y old  Female who presents with a chief complaint of     HPI:   Patient is a 76F with PMHx of CAD, HTN, HLD, hypothyroidism , anemia, PSH total r knee replacement  presenting with chief complaint of right leg swelling. Patient spent the past few months in the DR, just returning from the DR today. 7 days ago, she developed a pimple on the dorsal surface of her first big toe. She popped it and started draining fluids. Blister returned and surrounding area started getting red. 5 days ago, she noted that the redness extended to her calf, and started getting swollen. Denied any fever, chills. Has cough for 2 days, no sputum production, SOB.  Denies nausea, vomiting, diarrhea, abdominal pain, SOB, chest pain, PRUITT, palpitations, dizziness, headache, cough, wheezing.       Ed course : patient received Rocephin  1gr X1, and 1 liter bolus , was seen by podietry , recommended no MRI needed given low suspicion for osteomyelitis      (05 Aug 2021 18:10)      Podiatry HPI:    PMH:History of hypertension    Hypercholesterolemia    Myocardial infarction syndrome    Osteoarthritis    Hypothyroid    CAD (coronary artery disease)    Depression    Hyperlipidemia    Anemia      Allergies: No Known Allergies    Medications: ampicillin/sulbactam  IVPB        FH:Family history of cardiomyopathy (Mother)    Family history of coronary artery disease (Father, Sibling)    Family history of lung cancer (Sibling)    Family history of lung cancer (Father)    Family history of cardiomyopathy    Family history of coronary artery disease (Mother)      PSX: Cholecystitis    History of angioplasty    History of shoulder surgery      SH: Social History:      Vital Signs Last 24 Hrs  T(C): 36.4 (05 Aug 2021 18:48), Max: 37 (05 Aug 2021 16:20)  T(F): 97.6 (05 Aug 2021 18:48), Max: 98.6 (05 Aug 2021 16:20)  HR: 66 (05 Aug 2021 18:48) (66 - 74)  BP: 109/63 (05 Aug 2021 18:48) (109/63 - 128/73)  BP(mean): --  RR: 18 (05 Aug 2021 18:48) (18 - 18)  SpO2: 96% (05 Aug 2021 18:48) (96% - 96%)    LABS                        10.7   8.45  )-----------( 240      ( 05 Aug 2021 14:05 )             33.7               08-05    137  |  106  |  18  ----------------------------<  105<H>  4.3   |  27  |  0.79    Ca    8.6      05 Aug 2021 14:05    TPro  7.3  /  Alb  3.4<L>  /  TBili  0.4  /  DBili  x   /  AST  28  /  ALT  56  /  AlkPhos  106  08-05     WBC Count: 8.45 K/uL (08-05-21 @ 14:05)      ROS  REVIEW OF SYSTEMS:    CONSTITUTIONAL: No fever, weight loss, or fatigue  EYES: No eye pain, visual disturbances, or discharge  ENMT:  No difficulty hearing, tinnitus, vertigo; No sinus or throat pain  NECK: No pain or stiffness  BREASTS: No pain, masses, or nipple discharge  RESPIRATORY: No cough, wheezing, chills or hemoptysis; No shortness of breath  CARDIOVASCULAR: No chest pain, palpitations, dizziness, or leg swelling  GASTROINTESTINAL: No abdominal or epigastric pain. No nausea, vomiting, or hematemesis; No diarrhea or constipation. No melena or hematochezia.  GENITOURINARY: No dysuria, frequency, hematuria, or incontinence  NEUROLOGICAL: No headaches, memory loss, loss of strength, numbness, or tremors  SKIN: No itching, burning, rashes, or lesions   LYMPH NODES: No enlarged glands  ENDOCRINE: No heat or cold intolerance; No hair loss  MUSCULOSKELETAL: No joint pain or swelling; No muscle, back, or extremity pain  PSYCHIATRIC: No depression, anxiety, mood swings, or difficulty sleeping  HEME/LYMPH: No easy bruising, or bleeding gums  ALLERY AND IMMUNOLOGIC: No hives or eczema      PHYSICAL EXAM  GEN: OFELIA MOSES is a pleasant well-nourished, well developed 76y Female in no acute distress, alert awake, and oriented to person, place and time.   LE Focused:  RLE   Vasc:  Pulses palpable 2/4 R. CFT <3seconds to distal tuft R. Skin temp warm to warm proximal to distal R.   Derm: Erythema noted to R great toe extending proximally to midfoot dorsally. Wound noted to dorsal R great toe measuring 2cm x 1cm x 0.3 cm. 3 cc purulent drainage noted to wound upon expression. Mild fluctuance noted to R great toe wound. -PTB.    Neuro: Protective sensation intact   MSK: Pain on palpation to R great toe. Able to wiggle toes R.     Imaging: xray pending (8/5)      MRI pending (8/5)      Cultures: PENDING (8/5)        Podiatry HPI: Patient presents to the ED for R foot cellulitis. Patient states she went to the [place] where she had a blister on her R great toe. Patient states as the week went on the blister got worse and worse. Patient states doctor from  gave her cream to apply but has not helped. Patient states R great toe is painful to touch and she is unable to walk. Patient denies any trauma to area. Denies N/V/F/C/SOB. No other pedal complaints.         Patient is a 76y old  Female who presents with a chief complaint of     HPI:   Patient is a 76F with PMHx of CAD, HTN, HLD, hypothyroidism , anemia, PSH total r knee replacement  presenting with chief complaint of right leg swelling. Patient spent the past few months in the DR, just returning from the DR today. 7 days ago, she developed a pimple on the dorsal surface of her first big toe. She popped it and started draining fluids. Blister returned and surrounding area started getting red. 5 days ago, she noted that the redness extended to her calf, and started getting swollen. Denied any fever, chills. Has cough for 2 days, no sputum production, SOB.  Denies nausea, vomiting, diarrhea, abdominal pain, SOB, chest pain, PRUITT, palpitations, dizziness, headache, cough, wheezing.       Ed course : patient received Rocephin  1gr X1, and 1 liter bolus , was seen by podietry , recommended no MRI needed given low suspicion for osteomyelitis      (05 Aug 2021 18:10)      Podiatry HPI:    PMH:History of hypertension    Hypercholesterolemia    Myocardial infarction syndrome    Osteoarthritis    Hypothyroid    CAD (coronary artery disease)    Depression    Hyperlipidemia    Anemia      Allergies: No Known Allergies    Medications: ampicillin/sulbactam  IVPB        FH:Family history of cardiomyopathy (Mother)    Family history of coronary artery disease (Father, Sibling)    Family history of lung cancer (Sibling)    Family history of lung cancer (Father)    Family history of cardiomyopathy    Family history of coronary artery disease (Mother)      PSX: Cholecystitis    History of angioplasty    History of shoulder surgery      SH: Social History:      Vital Signs Last 24 Hrs  T(C): 36.4 (05 Aug 2021 18:48), Max: 37 (05 Aug 2021 16:20)  T(F): 97.6 (05 Aug 2021 18:48), Max: 98.6 (05 Aug 2021 16:20)  HR: 66 (05 Aug 2021 18:48) (66 - 74)  BP: 109/63 (05 Aug 2021 18:48) (109/63 - 128/73)  BP(mean): --  RR: 18 (05 Aug 2021 18:48) (18 - 18)  SpO2: 96% (05 Aug 2021 18:48) (96% - 96%)    LABS                        10.7   8.45  )-----------( 240      ( 05 Aug 2021 14:05 )             33.7               08-05    137  |  106  |  18  ----------------------------<  105<H>  4.3   |  27  |  0.79    Ca    8.6      05 Aug 2021 14:05    TPro  7.3  /  Alb  3.4<L>  /  TBili  0.4  /  DBili  x   /  AST  28  /  ALT  56  /  AlkPhos  106  08-05     WBC Count: 8.45 K/uL (08-05-21 @ 14:05)      ROS  REVIEW OF SYSTEMS:    CONSTITUTIONAL: No fever, weight loss, or fatigue  EYES: No eye pain, visual disturbances, or discharge  ENMT:  No difficulty hearing, tinnitus, vertigo; No sinus or throat pain  NECK: No pain or stiffness  BREASTS: No pain, masses, or nipple discharge  RESPIRATORY: No cough, wheezing, chills or hemoptysis; No shortness of breath  CARDIOVASCULAR: No chest pain, palpitations, dizziness, or leg swelling  GASTROINTESTINAL: No abdominal or epigastric pain. No nausea, vomiting, or hematemesis; No diarrhea or constipation. No melena or hematochezia.  GENITOURINARY: No dysuria, frequency, hematuria, or incontinence  NEUROLOGICAL: No headaches, memory loss, loss of strength, numbness, or tremors  SKIN: No itching, burning, rashes, or lesions   LYMPH NODES: No enlarged glands  ENDOCRINE: No heat or cold intolerance; No hair loss  MUSCULOSKELETAL: No joint pain or swelling; No muscle, back, or extremity pain  PSYCHIATRIC: No depression, anxiety, mood swings, or difficulty sleeping  HEME/LYMPH: No easy bruising, or bleeding gums  ALLERY AND IMMUNOLOGIC: No hives or eczema      PHYSICAL EXAM  GEN: OFELIA MOSES is a pleasant well-nourished, well developed 76y Female in no acute distress, alert awake, and oriented to person, place and time.   LE Focused:  RLE   Vasc:  Pulses palpable 2/4 R. CFT <3seconds to distal tuft R. Skin temp warm to warm proximal to distal R.   Derm: Erythema noted to R great toe extending proximally to midfoot dorsally. Wound noted to dorsal R great toe measuring 2cm x 1cm x 0.3 cm. 3 cc purulent drainage noted to wound upon expression. Mild fluctuance noted to R great toe wound. -PTB.    Neuro: Protective sensation intact   MSK: Pain on palpation to R great toe. Able to wiggle toes R.     Imaging: xray pending (8/5) `      MRI pending (8/5)      Cultures: PENDING (8/5)       US Duplex: EXAM:  US DPLX LWR EXT VEINS LTD RT                            PROCEDURE DATE:  08/05/2021          INTERPRETATION:  CLINICAL INFORMATION: Right leg edema and swelling    COMPARISON: None available.    TECHNIQUE: Duplex sonography of the RIGHT LOWER extremity veins with color and spectral Doppler, with and without compression.    FINDINGS:    There is normal compressibility of the right common femoral, femoral and popliteal veins.  Doppler examination shows normal spontaneous and phasic flow.    No calf vein thrombosis is detected.    IMPRESSION:  No evidence of right lower extremity deep venous thrombosis.    --- End of Report ---

## 2021-08-05 NOTE — H&P ADULT - NSHPPHYSICALEXAM_GEN_ALL_CORE
LOS:     VITALS:   T(C): 36.4 (08-05-21 @ 18:48), Max: 37 (08-05-21 @ 16:20)  HR: 66 (08-05-21 @ 18:48) (66 - 74)  BP: 109/63 (08-05-21 @ 18:48) (109/63 - 128/73)  RR: 18 (08-05-21 @ 18:48) (18 - 18)  SpO2: 96% (08-05-21 @ 18:48) (96% - 96%)    GENERAL: NAD, lying in bed comfortably  HEAD:  Atraumatic, Normocephalic  EYES: EOMI, PERRLA, conjunctiva and sclera clear  ENT: Moist mucous membranes  NECK: Supple, No JVD  CHEST/LUNG: Clear to auscultation bilaterally; No rales, rhonchi, wheezing, or rubs. Unlabored respirations  HEART: Regular rate and rhythm; No murmurs, rubs, or gallops  ABDOMEN: BSx4; Soft, nontender, nondistended  EXTREMITIES:  2+ Peripheral Pulses, brisk capillary refill. No clubbing, cyanosis, or edema  NERVOUS SYSTEM:  A&Ox3, no focal deficits   SKIN: erythema at the base of the R great toe, that extends to the dorsal area of the foot up to below right knee

## 2021-08-06 ENCOUNTER — TRANSCRIPTION ENCOUNTER (OUTPATIENT)
Age: 76
End: 2021-08-06

## 2021-08-06 DIAGNOSIS — Z02.9 ENCOUNTER FOR ADMINISTRATIVE EXAMINATIONS, UNSPECIFIED: ICD-10-CM

## 2021-08-06 DIAGNOSIS — Z29.9 ENCOUNTER FOR PROPHYLACTIC MEASURES, UNSPECIFIED: ICD-10-CM

## 2021-08-06 DIAGNOSIS — L03.115 CELLULITIS OF RIGHT LOWER LIMB: ICD-10-CM

## 2021-08-06 LAB
ALBUMIN SERPL ELPH-MCNC: 2.9 G/DL — LOW (ref 3.5–5)
ALP SERPL-CCNC: 98 U/L — SIGNIFICANT CHANGE UP (ref 40–120)
ALT FLD-CCNC: 43 U/L DA — SIGNIFICANT CHANGE UP (ref 10–60)
ANION GAP SERPL CALC-SCNC: 6 MMOL/L — SIGNIFICANT CHANGE UP (ref 5–17)
AST SERPL-CCNC: 15 U/L — SIGNIFICANT CHANGE UP (ref 10–40)
BILIRUB SERPL-MCNC: 0.2 MG/DL — SIGNIFICANT CHANGE UP (ref 0.2–1.2)
BUN SERPL-MCNC: 18 MG/DL — SIGNIFICANT CHANGE UP (ref 7–18)
CALCIUM SERPL-MCNC: 8.5 MG/DL — SIGNIFICANT CHANGE UP (ref 8.4–10.5)
CHLORIDE SERPL-SCNC: 109 MMOL/L — HIGH (ref 96–108)
CHOLEST SERPL-MCNC: 170 MG/DL — SIGNIFICANT CHANGE UP
CO2 SERPL-SCNC: 26 MMOL/L — SIGNIFICANT CHANGE UP (ref 22–31)
COVID-19 SPIKE DOMAIN AB INTERP: POSITIVE
COVID-19 SPIKE DOMAIN ANTIBODY RESULT: 148 U/ML — HIGH
CREAT SERPL-MCNC: 0.66 MG/DL — SIGNIFICANT CHANGE UP (ref 0.5–1.3)
CRP SERPL-MCNC: 32 MG/L — HIGH
CULTURE RESULTS: SIGNIFICANT CHANGE UP
ERYTHROCYTE [SEDIMENTATION RATE] IN BLOOD: 6 MM/HR — SIGNIFICANT CHANGE UP (ref 0–20)
FERRITIN SERPL-MCNC: 100 NG/ML — SIGNIFICANT CHANGE UP (ref 15–150)
GLUCOSE SERPL-MCNC: 123 MG/DL — HIGH (ref 70–99)
HCT VFR BLD CALC: 34 % — LOW (ref 34.5–45)
HCV AB S/CO SERPL IA: 0.19 S/CO — SIGNIFICANT CHANGE UP (ref 0–0.99)
HCV AB SERPL-IMP: SIGNIFICANT CHANGE UP
HDLC SERPL-MCNC: 38 MG/DL — LOW
HGB BLD-MCNC: 10.8 G/DL — LOW (ref 11.5–15.5)
IRON SATN MFR SERPL: 14 % — LOW (ref 15–50)
IRON SATN MFR SERPL: 35 UG/DL — LOW (ref 40–150)
LIPID PNL WITH DIRECT LDL SERPL: 107 MG/DL — HIGH
MAGNESIUM SERPL-MCNC: 1.9 MG/DL — SIGNIFICANT CHANGE UP (ref 1.6–2.6)
MCHC RBC-ENTMCNC: 30.5 PG — SIGNIFICANT CHANGE UP (ref 27–34)
MCHC RBC-ENTMCNC: 31.8 GM/DL — LOW (ref 32–36)
MCV RBC AUTO: 96 FL — SIGNIFICANT CHANGE UP (ref 80–100)
NON HDL CHOLESTEROL: 132 MG/DL — HIGH
NRBC # BLD: 0 /100 WBCS — SIGNIFICANT CHANGE UP (ref 0–0)
PHOSPHATE SERPL-MCNC: 3.7 MG/DL — SIGNIFICANT CHANGE UP (ref 2.5–4.5)
PLATELET # BLD AUTO: 249 K/UL — SIGNIFICANT CHANGE UP (ref 150–400)
POTASSIUM SERPL-MCNC: 4.3 MMOL/L — SIGNIFICANT CHANGE UP (ref 3.5–5.3)
POTASSIUM SERPL-SCNC: 4.3 MMOL/L — SIGNIFICANT CHANGE UP (ref 3.5–5.3)
PROT SERPL-MCNC: 6.9 G/DL — SIGNIFICANT CHANGE UP (ref 6–8.3)
RBC # BLD: 3.54 M/UL — LOW (ref 3.8–5.2)
RBC # FLD: 13.8 % — SIGNIFICANT CHANGE UP (ref 10.3–14.5)
SARS-COV-2 IGG+IGM SERPL QL IA: 148 U/ML — HIGH
SARS-COV-2 IGG+IGM SERPL QL IA: POSITIVE
SODIUM SERPL-SCNC: 141 MMOL/L — SIGNIFICANT CHANGE UP (ref 135–145)
SPECIMEN SOURCE: SIGNIFICANT CHANGE UP
TIBC SERPL-MCNC: 259 UG/DL — SIGNIFICANT CHANGE UP (ref 250–450)
TRIGL SERPL-MCNC: 124 MG/DL — SIGNIFICANT CHANGE UP
TSH SERPL-MCNC: 4.44 UU/ML — SIGNIFICANT CHANGE UP (ref 0.34–4.82)
UIBC SERPL-MCNC: 224 UG/DL — SIGNIFICANT CHANGE UP (ref 110–370)
WBC # BLD: 7.09 K/UL — SIGNIFICANT CHANGE UP (ref 3.8–10.5)
WBC # FLD AUTO: 7.09 K/UL — SIGNIFICANT CHANGE UP (ref 3.8–10.5)

## 2021-08-06 PROCEDURE — 73718 MRI LOWER EXTREMITY W/O DYE: CPT | Mod: 26,RT

## 2021-08-06 PROCEDURE — 73630 X-RAY EXAM OF FOOT: CPT | Mod: 26,RT

## 2021-08-06 RX ORDER — VANCOMYCIN HCL 1 G
1000 VIAL (EA) INTRAVENOUS EVERY 12 HOURS
Refills: 0 | Status: DISCONTINUED | OUTPATIENT
Start: 2021-08-06 | End: 2021-08-09

## 2021-08-06 RX ORDER — ACETAMINOPHEN 500 MG
650 TABLET ORAL ONCE
Refills: 0 | Status: COMPLETED | OUTPATIENT
Start: 2021-08-06 | End: 2021-08-06

## 2021-08-06 RX ORDER — ACETAMINOPHEN 500 MG
650 TABLET ORAL EVERY 6 HOURS
Refills: 0 | Status: DISCONTINUED | OUTPATIENT
Start: 2021-08-06 | End: 2021-08-09

## 2021-08-06 RX ADMIN — CARVEDILOL PHOSPHATE 25 MILLIGRAM(S): 80 CAPSULE, EXTENDED RELEASE ORAL at 05:57

## 2021-08-06 RX ADMIN — Medication 650 MILLIGRAM(S): at 22:15

## 2021-08-06 RX ADMIN — Medication 500 MILLIGRAM(S): at 13:40

## 2021-08-06 RX ADMIN — GABAPENTIN 300 MILLIGRAM(S): 400 CAPSULE ORAL at 05:57

## 2021-08-06 RX ADMIN — AMPICILLIN SODIUM AND SULBACTAM SODIUM 200 GRAM(S): 250; 125 INJECTION, POWDER, FOR SUSPENSION INTRAMUSCULAR; INTRAVENOUS at 05:57

## 2021-08-06 RX ADMIN — Medication 650 MILLIGRAM(S): at 14:15

## 2021-08-06 RX ADMIN — Medication 650 MILLIGRAM(S): at 04:16

## 2021-08-06 RX ADMIN — HEPARIN SODIUM 5000 UNIT(S): 5000 INJECTION INTRAVENOUS; SUBCUTANEOUS at 22:12

## 2021-08-06 RX ADMIN — AMPICILLIN SODIUM AND SULBACTAM SODIUM 200 GRAM(S): 250; 125 INJECTION, POWDER, FOR SUSPENSION INTRAMUSCULAR; INTRAVENOUS at 18:20

## 2021-08-06 RX ADMIN — Medication 325 MILLIGRAM(S): at 05:57

## 2021-08-06 RX ADMIN — GABAPENTIN 300 MILLIGRAM(S): 400 CAPSULE ORAL at 17:31

## 2021-08-06 RX ADMIN — Medication 650 MILLIGRAM(S): at 04:46

## 2021-08-06 RX ADMIN — CARVEDILOL PHOSPHATE 25 MILLIGRAM(S): 80 CAPSULE, EXTENDED RELEASE ORAL at 17:31

## 2021-08-06 RX ADMIN — HEPARIN SODIUM 5000 UNIT(S): 5000 INJECTION INTRAVENOUS; SUBCUTANEOUS at 05:57

## 2021-08-06 RX ADMIN — AMPICILLIN SODIUM AND SULBACTAM SODIUM 200 GRAM(S): 250; 125 INJECTION, POWDER, FOR SUSPENSION INTRAMUSCULAR; INTRAVENOUS at 13:40

## 2021-08-06 RX ADMIN — Medication 325 MILLIGRAM(S): at 13:40

## 2021-08-06 RX ADMIN — PANTOPRAZOLE SODIUM 40 MILLIGRAM(S): 20 TABLET, DELAYED RELEASE ORAL at 05:57

## 2021-08-06 RX ADMIN — SERTRALINE 50 MILLIGRAM(S): 25 TABLET, FILM COATED ORAL at 13:40

## 2021-08-06 RX ADMIN — Medication 75 MICROGRAM(S): at 05:57

## 2021-08-06 RX ADMIN — Medication 325 MILLIGRAM(S): at 22:12

## 2021-08-06 RX ADMIN — Medication 650 MILLIGRAM(S): at 13:39

## 2021-08-06 RX ADMIN — Medication 650 MILLIGRAM(S): at 22:45

## 2021-08-06 RX ADMIN — HEPARIN SODIUM 5000 UNIT(S): 5000 INJECTION INTRAVENOUS; SUBCUTANEOUS at 13:40

## 2021-08-06 RX ADMIN — ZOLPIDEM TARTRATE 5 MILLIGRAM(S): 10 TABLET ORAL at 22:21

## 2021-08-06 NOTE — PROGRESS NOTE ADULT - SUBJECTIVE AND OBJECTIVE BOX
Podiatry HPI Interval: Podiatry Interval HPI: Patient seen bedside resting comfortably. Patient speaks Burkinan and needs . No new events occurred overnight. Patient states R great toe is painful. Patient denies N/V/F/C/SOB.     Podiatry HPI: Patient presents to the ED for R foot cellulitis. Patient states she went to the [place] where she had a blister on her R great toe. Patient states as the week went on the blister got worse and worse. Patient states doctor from [place] gave her cream to apply but has not helped. Patient states R great toe is painful to touch and she is unable to walk. Patient denies any trauma to area. Denies N/V/F/C/SOB. No other pedal complaints.         Patient is a 76y old  Female who presents with a chief complaint of     HPI:   Patient is a 76F with PMHx of CAD, HTN, HLD, hypothyroidism , anemia, PSH total r knee replacement  presenting with chief complaint of right leg swelling. Patient spent the past few months in the [place], just returning from the [place] today. 7 days ago, she developed a pimple on the dorsal surface of her first big toe. She popped it and started draining fluids. Blister returned and surrounding area started getting red. 5 days ago, she noted that the redness extended to her calf, and started getting swollen. Denied any fever, chills. Has cough for 2 days, no sputum production, SOB.  Denies nausea, vomiting, diarrhea, abdominal pain, SOB, chest pain, PRUITT, palpitations, dizziness, headache, cough, wheezing.       Ed course : patient received Rocephin  1gr X1, and 1 liter bolus , was seen by podietry , recommended no MRI needed given low suspicion for osteomyelitis      (05 Aug 2021 18:10)      Podiatry HPI:    PMH:History of hypertension    Hypercholesterolemia    Myocardial infarction syndrome    Osteoarthritis    Hypothyroid    CAD (coronary artery disease)    Depression    Hyperlipidemia    Anemia      Allergies: No Known Allergies    Medications: ampicillin/sulbactam  IVPB        FH:Family history of cardiomyopathy (Mother)    Family history of coronary artery disease (Father, Sibling)    Family history of lung cancer (Sibling)    Family history of lung cancer (Father)    Family history of cardiomyopathy    Family history of coronary artery disease (Mother)      PSX: Cholecystitis    History of angioplasty    History of shoulder surgery      SH: Social History:      Vital Signs Last 24 Hrs  T(C): 36.4 (05 Aug 2021 18:48), Max: 37 (05 Aug 2021 16:20)  T(F): 97.6 (05 Aug 2021 18:48), Max: 98.6 (05 Aug 2021 16:20)  HR: 66 (05 Aug 2021 18:48) (66 - 74)  BP: 109/63 (05 Aug 2021 18:48) (109/63 - 128/73)  BP(mean): --  RR: 18 (05 Aug 2021 18:48) (18 - 18)  SpO2: 96% (05 Aug 2021 18:48) (96% - 96%)    LABS                        10.7   8.45  )-----------( 240      ( 05 Aug 2021 14:05 )             33.7               08-05    137  |  106  |  18  ----------------------------<  105<H>  4.3   |  27  |  0.79    Ca    8.6      05 Aug 2021 14:05    TPro  7.3  /  Alb  3.4<L>  /  TBili  0.4  /  DBili  x   /  AST  28  /  ALT  56  /  AlkPhos  106  08-05     WBC Count: 8.45 K/uL (08-05-21 @ 14:05)      ROS  REVIEW OF SYSTEMS:    CONSTITUTIONAL: No fever, weight loss, or fatigue  EYES: No eye pain, visual disturbances, or discharge  ENMT:  No difficulty hearing, tinnitus, vertigo; No sinus or throat pain  NECK: No pain or stiffness  BREASTS: No pain, masses, or nipple discharge  RESPIRATORY: No cough, wheezing, chills or hemoptysis; No shortness of breath  CARDIOVASCULAR: No chest pain, palpitations, dizziness, or leg swelling  GASTROINTESTINAL: No abdominal or epigastric pain. No nausea, vomiting, or hematemesis; No diarrhea or constipation. No melena or hematochezia.  GENITOURINARY: No dysuria, frequency, hematuria, or incontinence  NEUROLOGICAL: No headaches, memory loss, loss of strength, numbness, or tremors  SKIN: No itching, burning, rashes, or lesions   LYMPH NODES: No enlarged glands  ENDOCRINE: No heat or cold intolerance; No hair loss  MUSCULOSKELETAL: No joint pain or swelling; No muscle, back, or extremity pain  PSYCHIATRIC: No depression, anxiety, mood swings, or difficulty sleeping  HEME/LYMPH: No easy bruising, or bleeding gums  ALLERY AND IMMUNOLOGIC: No hives or eczema      PHYSICAL EXAM  GEN: OFELIA MOSES is a pleasant well-nourished, well developed 76y Female in no acute distress, alert awake, and oriented to person, place and time.   LE Focused:  RLE   Vasc: Pulses palpable 2/4 R. CFT <3seconds to distal tuft R. Skin temp warm to warm proximal to distal R.   Derm: Erythema noted to R great toe extending proximally to midfoot dorsally. Wound noted to dorsal R great toe measuring 2cm x 1cm x 0.3 cm. 3 cc purulent drainage noted to wound upon expression. Mild fluctuance noted to R great toe wound. +PTB.    Neuro: Protective sensation intact   MSK: Pain on palpation to R great toe. Able to wiggle toes R.     Imaging: xray pending (8/5) `      MRI pending (8/5)      Cultures: PENDING (8/5)       US Duplex: EXAM:  US DPLX LWR EXT VEINS LTD RT                            PROCEDURE DATE:  08/05/2021          INTERPRETATION:  CLINICAL INFORMATION: Right leg edema and swelling    COMPARISON: None available.    TECHNIQUE: Duplex sonography of the RIGHT LOWER extremity veins with color and spectral Doppler, with and without compression.    FINDINGS:    There is normal compressibility of the right common femoral, femoral and popliteal veins.  Doppler examination shows normal spontaneous and phasic flow.    No calf vein thrombosis is detected.    IMPRESSION:  No evidence of right lower extremity deep venous thrombosis.    --- End of Report ---

## 2021-08-06 NOTE — CONSULT NOTE ADULT - SUBJECTIVE AND OBJECTIVE BOX
Patient is a 76y old  Female from home with PMHx of CAD, HTN, HLD, hypothyroidism, who came to the ER for evaluation of RLE swelling and tenderness. Pt endorses that a week ago she was in [place] and developed a sore on the dorsal surface of her first big toe. She popped it and started draining fluid. Few days later the affected area developed redness extended to her calf, associated with swelling and tenderness. Also endorses cough for 2 days, no sputum production. On admission, she has no fever or Leukocytosis. She has started on Unasyn and the ID consult requested to assist with further evaluation and antibiotic management.       REVIEW OF SYSTEMS: Total of twelve systems have been reviewed with patient and found to be negative unless mentioned in HPI        PAST MEDICAL & SURGICAL HISTORY:  History of hypertension  Hypothyroid  CAD (coronary artery disease)  Hyperlipidemia  Cholecystitis, s/p cholecystectomy  History of angioplasty, 2002: mCx  H/O total knee replacement, right        SOCIAL HISTORY  Alcohol: Does not drink  Tobacco: Does not smoke  Illicit substance use: None      FAMILY HISTORY: Non contributory to the present illness      ALLERGIES: No Known Allergies      Vital Signs Last 24 Hrs  T(C): 37 (06 Aug 2021 12:43), Max: 37 (06 Aug 2021 04:43)  T(F): 98.6 (06 Aug 2021 12:43), Max: 98.6 (06 Aug 2021 04:43)  HR: 60 (06 Aug 2021 17:27) (57 - 80)  BP: 138/66 (06 Aug 2021 17:27) (113/59 - 138/66)  BP(mean): --  RR: 17 (06 Aug 2021 12:43) (17 - 18)  SpO2: 96% (06 Aug 2021 12:43) (94% - 96%)        PHYSICAL EXAM:  GENERAL: Not in distress   CHEST/LUNG: Not using accessory muscles   HEART: s1 and s2 present  ABDOMEN:  Nontender and  Nondistended  EXTREMITIES:   CNS: Awake and Alert      LABS:                        10.8   7.09  )-----------( 249      ( 06 Aug 2021 06:03 )             34.0       08-06    141  |  109<H>  |  18  ----------------------------<  123<H>  4.3   |  26  |  0.66    Ca    8.5      06 Aug 2021 06:03  Phos  3.7     08-06  Mg     1.9     08-06    TPro  6.9  /  Alb  2.9<L>  /  TBili  0.2  /  DBili  x   /  AST  15  /  ALT  43  /  AlkPhos  98  08-06    PT/INR - ( 05 Aug 2021 14:05 )   PT: 12.4 sec;   INR: 1.04 ratio      PTT - ( 05 Aug 2021 14:05 )  PTT:31.5 sec        Urinalysis Basic - ( 05 Aug 2021 21:22 )  Color: Yellow / Appearance: Clear / S.010 / pH: x  Gluc: x / Ketone: Negative  / Bili: Negative / Urobili: Negative   Blood: x / Protein: Negative / Nitrite: Negative   Leuk Esterase: Moderate / RBC: 2-5 /HPF / WBC 11-25 /HPF   Sq Epi: x / Non Sq Epi: Many /HPF / Bacteria: Moderate /HPF        MEDICATIONS  (STANDING):  ampicillin/sulbactam  IVPB      ampicillin/sulbactam  IVPB 3 Gram(s) IV Intermittent every 6 hours  ascorbic acid 500 milliGRAM(s) Oral daily  carvedilol 25 milliGRAM(s) Oral every 12 hours  ferrous    sulfate 325 milliGRAM(s) Oral three times a day  gabapentin 300 milliGRAM(s) Oral two times a day  heparin   Injectable 5000 Unit(s) SubCutaneous every 8 hours  levothyroxine 75 MICROGram(s) Oral daily  pantoprazole    Tablet 40 milliGRAM(s) Oral before breakfast  sertraline 50 milliGRAM(s) Oral daily    MEDICATIONS  (PRN):  acetaminophen   Tablet .. 650 milliGRAM(s) Oral every 6 hours PRN Mild Pain (1 - 3), Moderate Pain (4 - 6)  zolpidem 5 milliGRAM(s) Oral at bedtime PRN Insomnia  zolpidem 5 milliGRAM(s) Oral at bedtime PRN Insomnia          RADIOLOGY & ADDITIONAL TESTS:             Patient is a 76y old  Female from home with PMHx of CAD, HTN, HLD, hypothyroidism, who came to the ER for evaluation of RLE swelling and tenderness. Pt endorses that a week ago she was in [place] and developed a sore on the dorsal surface of her first big toe. She popped it and started draining fluid. Few days later the affected area developed redness extended to her calf, associated with swelling and tenderness. Also endorses cough for 2 days, no sputum production. On admission, she has no fever or Leukocytosis. She has started on Unasyn and the ID consult requested to assist with further evaluation and antibiotic management.       REVIEW OF SYSTEMS: Total of twelve systems have been reviewed with patient and found to be negative unless mentioned in HPI      PAST MEDICAL & SURGICAL HISTORY:  History of hypertension  Hypothyroid  CAD (coronary artery disease)  Hyperlipidemia  Cholecystitis, s/p cholecystectomy  History of angioplasty, 2002: mCx  H/O total knee replacement, right        SOCIAL HISTORY  Alcohol: Does not drink  Tobacco: Does not smoke  Illicit substance use: None      FAMILY HISTORY: Non contributory to the present illness      ALLERGIES: No Known Allergies      Vital Signs Last 24 Hrs  T(C): 37 (06 Aug 2021 12:43), Max: 37 (06 Aug 2021 04:43)  T(F): 98.6 (06 Aug 2021 12:43), Max: 98.6 (06 Aug 2021 04:43)  HR: 60 (06 Aug 2021 17:27) (57 - 80)  BP: 138/66 (06 Aug 2021 17:27) (113/59 - 138/66)  BP(mean): --  RR: 17 (06 Aug 2021 12:43) (17 - 18)  SpO2: 96% (06 Aug 2021 12:43) (94% - 96%)        PHYSICAL EXAM:  GENERAL: Not in distress   CHEST/LUNG: Not using accessory muscles   HEART: s1 and s2 present  ABDOMEN:  Nontender and  Nondistended  EXTREMITIES: Right foot bandage in placed   CNS: Awake and Alert      LABS:                        10.8   7.09  )-----------( 249      ( 06 Aug 2021 06:03 )             34.0       08-06    141  |  109<H>  |  18  ----------------------------<  123<H>  4.3   |  26  |  0.66    Ca    8.5      06 Aug 2021 06:03  Phos  3.7     08-06  Mg     1.9     08-06    TPro  6.9  /  Alb  2.9<L>  /  TBili  0.2  /  DBili  x   /  AST  15  /  ALT  43  /  AlkPhos  98  08-06    PT/INR - ( 05 Aug 2021 14:05 )   PT: 12.4 sec;   INR: 1.04 ratio      PTT - ( 05 Aug 2021 14:05 )  PTT:31.5 sec        Urinalysis Basic - ( 05 Aug 2021 21:22 )  Color: Yellow / Appearance: Clear / S.010 / pH: x  Gluc: x / Ketone: Negative  / Bili: Negative / Urobili: Negative   Blood: x / Protein: Negative / Nitrite: Negative   Leuk Esterase: Moderate / RBC: 2-5 /HPF / WBC 11-25 /HPF   Sq Epi: x / Non Sq Epi: Many /HPF / Bacteria: Moderate /HPF        MEDICATIONS  (STANDING):  ampicillin/sulbactam  IVPB      ampicillin/sulbactam  IVPB 3 Gram(s) IV Intermittent every 6 hours  ascorbic acid 500 milliGRAM(s) Oral daily  carvedilol 25 milliGRAM(s) Oral every 12 hours  ferrous    sulfate 325 milliGRAM(s) Oral three times a day  gabapentin 300 milliGRAM(s) Oral two times a day  heparin   Injectable 5000 Unit(s) SubCutaneous every 8 hours  levothyroxine 75 MICROGram(s) Oral daily  pantoprazole    Tablet 40 milliGRAM(s) Oral before breakfast  sertraline 50 milliGRAM(s) Oral daily    MEDICATIONS  (PRN):  acetaminophen   Tablet .. 650 milliGRAM(s) Oral every 6 hours PRN Mild Pain (1 - 3), Moderate Pain (4 - 6)  zolpidem 5 milliGRAM(s) Oral at bedtime PRN Insomnia  zolpidem 5 milliGRAM(s) Oral at bedtime PRN Insomnia          RADIOLOGY & ADDITIONAL TESTS:

## 2021-08-06 NOTE — DISCHARGE NOTE PROVIDER - NSDCCPCAREPLAN_GEN_ALL_CORE_FT
PRINCIPAL DISCHARGE DIAGNOSIS  Diagnosis: Cellulitis of foot, right  Assessment and Plan of Treatment: you presented to hospital for right lower leg redness and swelling.   You were started on IV antibiotics and were evaluated by ID and Podiatry specialist   Take all of your antibiotics as ordered.  Call your Health Care Provider within two days of arriving home to make a follow up appointment within one week.  If the affected cellulitic area increases in redness, warmth, pain or swelling call your Health Care Provider.  If you develop fever, chills, and/or malaise, call your Health Care Provider.        SECONDARY DISCHARGE DIAGNOSES  Diagnosis: Coronary artery disease involving native coronary artery of native heart with angina pectoris  Assessment and Plan of Treatment: Coronary artery disease is a condition where the arteries the supply the heart muscle gets clogged with fatty deposits & puts you at risk for a heart attack. Continue with medications as prescribed.   Call your doctor if you have any new pain, pressure, or discomfort in the center of your chest, pain, tingling or discomfort in arms, back, neck, jaw, or stomach, shortness of breath, nausea, vomiting, burping or heartburn, sweating, cold and clammy skin, racing or abnormal heartbeat for more than 10 minutes or if they keep coming & going.  Call 911 and do not tr to get to hospital by care  Take your cardiac medication as prescribed to lower cholesterol, to lower blood pressure, aspirin to prevent blood clots, and diabetes control  Make sure to keep appointments with doctor for cardiac follow up care      Diagnosis: Hypertension, unspecified type  Assessment and Plan of Treatment: Low salt diet  Activity as tolerated.  Take all medication as prescribed.  Follow up with your medical doctor for routine blood pressure monitoring at your next visit.  Notify your doctor if you have any of the following symptoms:   Dizziness, Lightheadedness, Blurry vision, Headache, Chest pain, Shortness of breath       PRINCIPAL DISCHARGE DIAGNOSIS  Diagnosis: Cellulitis of foot, right  Assessment and Plan of Treatment: you presented to hospital for right lower leg redness and swelling.   You were started on IV antibiotics and were evaluated by ID and Podiatry specialist   Take all of your antibiotics as ordered.  Call your Health Care Provider within two days of arriving home to make a follow up appointment within one week.  If the affected cellulitic area increases in redness, warmth, pain or swelling call your Health Care Provider.  If you develop fever, chills, and/or malaise, call your Health Care Provider.  - You will receive visiting nurse services for wound care 3x/week. dressing to be done as follows; Change dressing using Betadine, 4x4s, wrap with  Varun & ACE  - You are to keep dressing clean, dry and intact when discharged   -Heel weight bearing  to right  foot using surgical shoe   - Please Follow up in [address] [place] [address] Wednesday 8/11   Discussed with Attending Dr. Cruz and seen bedside with Dr. Neal         SECONDARY DISCHARGE DIAGNOSES  Diagnosis: CAD (coronary artery disease)  Assessment and Plan of Treatment: Coronary artery disease is a condition where the arteries the supply the heart muscle get clogges with fatty deposits & puts you at risk for a heart attack  Call your doctor if you have any new pain, pressure, or discomfort in the center of your chest, pain, tingling or discomfort in arms, back, neck, jaw, or stomach, shortness of breath, nausea, vomiting, burping or heartburn, sweating, cold and clammy skin, racing or abnormal heartbeat for more than 10 minutes or if they keep coming & going.  Call 911 and do not tr to get to hospital by care  You can help yourself with lefestyle changes (quitting smoking if you smoke), eat lots of fruits & vegetables & low fat dairy products, not a lot of meat & fatty foods, walk or some form of physical activity most days of the week, lose weight if you are overweight  Take your cardiac medication as prescribed to lower cholesterol, to lower blood pressure, aspirin to prevent blood clots, and diabetes control  Make sure to keep appointments with doctor for cardiac follow up care      Diagnosis: Hypertension  Assessment and Plan of Treatment: Low salt diet  Activity as tolerated.  Take all medication as prescribed.  Follow up with your medical doctor for routine blood pressure monitoring at your next visit.  Notify your doctor if you have any of the following symptoms:   Dizziness, Lightheadedness, Blurry vision, Headache, Chest pain, Shortness of breath    Diagnosis: Opacity of lung on imaging study  Assessment and Plan of Treatment: Mild lung opacity noted on chest xray which was nonspecific  - You were treated with IV antibiotic therapy while inpatient and have been transitioned to oral to complete course.   - Follow up with your PMD within 1-2 week of discharge for possible repeat cxray    Diagnosis: High risk for diabetes mellitus  Assessment and Plan of Treatment: Your HA1c which gives a measure of your average blood glucose levels over the past 3 months is 6.4 which indicates that you are at a high risk for having diabetes  - A consistent carbohydrate diet is recommended  - Please follow up with your PMD for continued monitoring of your HA1c every 3 months and possible intervention if needed     PRINCIPAL DISCHARGE DIAGNOSIS  Diagnosis: MRSA cellulitis of right foot  Assessment and Plan of Treatment: you presented to hospital for right lower leg redness and swelling.   You were started on IV antibiotics and were evaluated by ID and Podiatry specialist   Take all of your antibiotics as ordered.  Call your Health Care Provider within two days of arriving home to make a follow up appointment within one week.  If the affected cellulitic area increases in redness, warmth, pain or swelling call your Health Care Provider.  If you develop fever, chills, and/or malaise, call your Health Care Provider.  - You will receive visiting nurse services for wound care 3x/week. dressing to be done as follows; Change dressing using Betadine, 4x4s, wrap with  Varun & ACE  - You are to keep dressing clean, dry and intact when discharged   -Heel weight bearing  to right  foot using surgical shoe   - Please Follow up in [address] [place] [address] Wednesday 8/11   To prevent spread of MRSA do the following:  Wash your hands  Keep wounds that are drainging or have pus covered with clean dry dressing.  Bandages or tape can be thrown in the regular garbage.  Avoid sharing items such as towels, washclothes, razors, clothing, or uniforms that may have had contact with the infected wound or bandage.  Wash sheets, towels, and clothes that become soiled with hot water and laundry detergent.  Dry clothes in a hot dryer rather than air drying.  This helps to kill the bacteria.      SECONDARY DISCHARGE DIAGNOSES  Diagnosis: CAD (coronary artery disease)  Assessment and Plan of Treatment: Coronary artery disease is a condition where the arteries the supply the heart muscle get clogges with fatty deposits & puts you at risk for a heart attack  Call your doctor if you have any new pain, pressure, or discomfort in the center of your chest, pain, tingling or discomfort in arms, back, neck, jaw, or stomach, shortness of breath, nausea, vomiting, burping or heartburn, sweating, cold and clammy skin, racing or abnormal heartbeat for more than 10 minutes or if they keep coming & going.  Call 911 and do not tr to get to hospital by care  You can help yourself with lefestyle changes (quitting smoking if you smoke), eat lots of fruits & vegetables & low fat dairy products, not a lot of meat & fatty foods, walk or some form of physical activity most days of the week, lose weight if you are overweight  Take your cardiac medication as prescribed to lower cholesterol, to lower blood pressure, aspirin to prevent blood clots, and diabetes control  Make sure to keep appointments with doctor for cardiac follow up care      Diagnosis: Hypertension  Assessment and Plan of Treatment: Low salt diet  Activity as tolerated.  Take all medication as prescribed.  Follow up with your medical doctor for routine blood pressure monitoring at your next visit.  Notify your doctor if you have any of the following symptoms:   Dizziness, Lightheadedness, Blurry vision, Headache, Chest pain, Shortness of breath    Diagnosis: Opacity of lung on imaging study  Assessment and Plan of Treatment: Mild lung opacity noted on chest xray which was nonspecific  - You were treated with IV antibiotic therapy while inpatient and have been transitioned to oral to complete course.   - Follow up with your PMD within 1-2 week of discharge for possible repeat cxray    Diagnosis: High risk for diabetes mellitus  Assessment and Plan of Treatment: Your HA1c which gives a measure of your average blood glucose levels over the past 3 months is 6.4 which indicates that you are at a high risk for having diabetes  - A consistent carbohydrate diet is recommended  - Please follow up with your PMD for continued monitoring of your HA1c every 3 months and possible intervention if needed

## 2021-08-06 NOTE — DISCHARGE NOTE PROVIDER - NSFOLLOWUPCLINICS_GEN_ALL_ED_FT
[facility]  Podiatry/Wound [facility]  [address] [place] [address]  Phone: [phone]  Fax: [phone]

## 2021-08-06 NOTE — CONSULT NOTE ADULT - ASSESSMENT
Patient is a 76y old  Female from home with PMHx of CAD, HTN, HLD, hypothyroidism, who came to the ER for evaluation of RLE swelling and tenderness. Pt endorses that a week ago she was in DR and developed a sore on the dorsal surface of her first big toe. She popped it and started draining fluid. Few days later the affected area developed redness extended to her calf, associated with swelling and tenderness. Also endorses cough for 2 days, no sputum production. On admission, she has no fever or Leukocytosis. She has started on Unasyn and the ID consult requested to assist with further evaluation and antibiotic management.     # RLE cellulitis    would recommend:    1. Follow up wound and urine culture  2. Keep RLE elevated   3. Continue Unasyn until work up is done    will follow the patient with you and make further recommendation based on the clinical course and Lab results  Thank you for the opportunity to participate in Ms. MOSES's care      Attending  Attestation:    Spent more than 65 minutes on total encounter, more than 50 % of the visit was spent counseling and/or coordinating care by the Attending physician.     Patient is a 76y old  Female from home with PMHx of CAD, HTN, HLD, hypothyroidism, who came to the ER for evaluation of RLE swelling and tenderness. Pt endorses that a week ago she was in DR and developed a sore on the dorsal surface of her first big toe. She popped it and started draining fluid. Few days later the affected area developed redness extended to her calf, associated with swelling and tenderness. Also endorses cough for 2 days, no sputum production. On admission, she has no fever or Leukocytosis. She has started on Unasyn and the ID consult requested to assist with further evaluation and antibiotic management.     # RLE cellulitis    would recommend:    1. Follow up wound and urine culture  2. Keep RLE elevated   3. Continue Unasyn and Add IV Vancomycin until work up is done  4. Wound care     will follow the patient with you and make further recommendation based on the clinical course and Lab results  Thank you for the opportunity to participate in Ms. MOSES's care      Attending  Attestation:    Spent more than 65 minutes on total encounter, more than 50 % of the visit was spent counseling and/or coordinating care by the Attending physician.

## 2021-08-06 NOTE — PROGRESS NOTE ADULT - SUBJECTIVE AND OBJECTIVE BOX
Patient is a 76y old  Female who presents with a chief complaint of R foot cellulitis (06 Aug 2021 10:20)  Seen and examined using video  Sada ID # [id number]    OVERNIGHT EVENTS:        REVIEW OF SYSTEMS:  CONSTITUTIONAL: No fever, chills  ENMT:  No difficulty hearing, no change in vision  NECK: No pain or stiffness  RESPIRATORY: No cough, SOB  CARDIOVASCULAR: No chest pain, palpitations  GASTROINTESTINAL: No abdominal pain. No nausea, vomiting, or diarrhea  GENITOURINARY: No dysuria  NEUROLOGICAL: No HA  SKIN: No itching, burning, rashes, or lesions   LYMPH NODES: No enlarged glands  ENDOCRINE: No heat or cold intolerance; No hair loss  MUSCULOSKELETAL: No joint pain or swelling; No muscle, back, or extremity pain  PSYCHIATRIC: No depression, anxiety  HEME/LYMPH: No easy bruising, or bleeding gums    T(C): 37 (21 @ 12:43), Max: 37 (21 @ 16:20)  HR: 76 (21 @ 12:43) (57 - 80)  BP: 125/79 (21 @ 12:43) (109/63 - 137/74)  RR: 17 (21 @ 12:43) (17 - 18)  SpO2: 96% (21 @ 12:43) (94% - 96%)  Wt(kg): --Vital Signs Last 24 Hrs  T(C): 37 (06 Aug 2021 12:43), Max: 37 (05 Aug 2021 16:20)  T(F): 98.6 (06 Aug 2021 12:43), Max: 98.6 (05 Aug 2021 16:20)  HR: 76 (06 Aug 2021 12:43) (57 - 80)  BP: 125/79 (06 Aug 2021 12:43) (109/63 - 137/74)  BP(mean): --  RR: 17 (06 Aug 2021 12:43) (17 - 18)  SpO2: 96% (06 Aug 2021 12:43) (94% - 96%)    PHYSICAL EXAM:  GENERAL: NAD  EYES: clear conjunctiva; EOMI  ENMT: Moist mucous membranes  NECK: Supple, No JVD, Normal thyroid  CHEST/LUNG: Clear to auscultation bilaterally; No rales, rhonchi, wheezing, or rubs  HEART: S1, S2, Regular rate and rhythm  ABDOMEN: Soft, Nontender, Nondistended; Bowel sounds present  NEURO: Alert & Oriented X3  EXTREMITIES: No LE edema, no calf tenderness  LYMPH: No lymphadenopathy noted  SKIN: No rashes or lesions    Consultant(s) Notes Reviewed:  [x ] YES  [ ] NO  Care Discussed with Consultants/Other Providers [ x] YES  [ ] NO    LABS:                        10.8   7.09  )-----------( 249      ( 06 Aug 2021 06:03 )             34.0     08-06    141  |  109<H>  |  18  ----------------------------<  123<H>  4.3   |  26  |  0.66    Ca    8.5      06 Aug 2021 06:03  Phos  3.7     08-06  Mg     1.9     08-06    TPro  6.9  /  Alb  2.9<L>  /  TBili  0.2  /  DBili  x   /  AST  15  /  ALT  43  /  AlkPhos  98  08-06    PT/INR - ( 05 Aug 2021 14:05 )   PT: 12.4 sec;   INR: 1.04 ratio         PTT - ( 05 Aug 2021 14:05 )  PTT:31.5 sec  CAPILLARY BLOOD GLUCOSE            Urinalysis Basic - ( 05 Aug 2021 21:22 )    Color: Yellow / Appearance: Clear / S.010 / pH: x  Gluc: x / Ketone: Negative  / Bili: Negative / Urobili: Negative   Blood: x / Protein: Negative / Nitrite: Negative   Leuk Esterase: Moderate / RBC: 2-5 /HPF / WBC 11-25 /HPF   Sq Epi: x / Non Sq Epi: Many /HPF / Bacteria: Moderate /HPF        RADIOLOGY & ADDITIONAL TESTS:    Imaging Personally Reviewed:  [ ] YES  [ ] NO   Patient is a 76y old  Female who presents with a chief complaint of R foot cellulitis (06 Aug 2021 10:20)  Seen and examined using video  Sada ID # [id number]    OVERNIGHT EVENTS: no acute events overnight, offering no new complaints       REVIEW OF SYSTEMS:  CONSTITUTIONAL: No fever, chills  NECK: No pain or stiffness  RESPIRATORY: No cough, SOB  CARDIOVASCULAR: No chest pain, palpitations  GASTROINTESTINAL: No abdominal pain.   GENITOURINARY: No dysuria  NEUROLOGICAL: No HA  MUSCULOSKELETAL: intermittent right foot pain       T(C): 37 (21 @ 12:43), Max: 37 (21 @ 16:20)  HR: 76 (21 @ 12:43) (57 - 80)  BP: 125/79 (21 @ 12:43) (109/63 - 137/74)  RR: 17 (21 @ 12:43) (17 - 18)  SpO2: 96% (21 @ 12:43) (94% - 96%)  Wt(kg): --Vital Signs Last 24 Hrs  T(C): 37 (06 Aug 2021 12:43), Max: 37 (05 Aug 2021 16:20)  T(F): 98.6 (06 Aug 2021 12:43), Max: 98.6 (05 Aug 2021 16:20)  HR: 76 (06 Aug 2021 12:43) (57 - 80)  BP: 125/79 (06 Aug 2021 12:43) (109/63 - 137/74)  BP(mean): --  RR: 17 (06 Aug 2021 12:43) (17 - 18)  SpO2: 96% (06 Aug 2021 12:43) (94% - 96%)        MEDICATIONS  (STANDING):  ampicillin/sulbactam  IVPB      ampicillin/sulbactam  IVPB 3 Gram(s) IV Intermittent every 6 hours  ascorbic acid 500 milliGRAM(s) Oral daily  carvedilol 25 milliGRAM(s) Oral every 12 hours  ferrous    sulfate 325 milliGRAM(s) Oral three times a day  gabapentin 300 milliGRAM(s) Oral two times a day  heparin   Injectable 5000 Unit(s) SubCutaneous every 8 hours  levothyroxine 75 MICROGram(s) Oral daily  pantoprazole    Tablet 40 milliGRAM(s) Oral before breakfast  sertraline 50 milliGRAM(s) Oral daily    MEDICATIONS  (PRN):  acetaminophen   Tablet .. 650 milliGRAM(s) Oral every 6 hours PRN Mild Pain (1 - 3), Moderate Pain (4 - 6)  zolpidem 5 milliGRAM(s) Oral at bedtime PRN Insomnia  zolpidem 5 milliGRAM(s) Oral at bedtime PRN Insomnia      PHYSICAL EXAM:  GENERAL: NAD  EYES: clear conjunctiva  ENMT: Moist mucous membranes  NECK: Supple, No JVD  CHEST/LUNG: Clear to auscultation bilaterally; No rales, rhonchi, wheezing, or rubs  HEART: S1, S2, Regular rate and rhythm  ABDOMEN: Soft, Nontender, Nondistended; Bowel sounds present  NEURO: Alert & Oriented X3  EXTREMITIES: No LE edema, no calf tenderness  SKIN: right foot with dsg     Consultant(s) Notes Reviewed:  [x ] YES  [ ] NO  Care Discussed with Consultants/Other Providers [ x] YES  [ ] NO    LABS:                        10.8   7.09  )-----------( 249      ( 06 Aug 2021 06:03 )             34.0     08-06    141  |  109<H>  |  18  ----------------------------<  123<H>  4.3   |  26  |  0.66    Ca    8.5      06 Aug 2021 06:03  Phos  3.7     08-06  Mg     1.9     08-06    TPro  6.9  /  Alb  2.9<L>  /  TBili  0.2  /  DBili  x   /  AST  15  /  ALT  43  /  AlkPhos  98  08-06  PT/INR - ( 05 Aug 2021 14:05 )   PT: 12.4 sec;   INR: 1.04 ratio      PTT - ( 05 Aug 2021 14:05 )  PTT:31.5 sec  CAPILLARY BLOOD GLUCOSE  Urinalysis Basic - ( 05 Aug 2021 21:22 )    Color: Yellow / Appearance: Clear / S.010 / pH: x  Gluc: x / Ketone: Negative  / Bili: Negative / Urobili: Negative   Blood: x / Protein: Negative / Nitrite: Negative   Leuk Esterase: Moderate / RBC: 2-5 /HPF / WBC 11-25 /HPF   Sq Epi: x / Non Sq Epi: Many /HPF / Bacteria: Moderate /HPF    RADIOLOGY & ADDITIONAL TESTS:    < from: Xray Foot AP + Lateral + Oblique, Right (21 @ 09:04) >    EXAM:  XR FOOT COMP MIN 3 VIEWS RT                            PROCEDURE DATE:  2021          INTERPRETATION:  RIGHT foot    CLINICAL INFORMATION: RIGHT great toe abscess. TECHNIQUE: AP,lateral and oblique views.    FINDINGS:  Soft tissue swelling of first hallux with bandage artifacts. No subcutaneous air or radiopaque foreign body. No radiographic evidence of underlying fracture or osteomyelitis. Remaining osseous and joint structures intact.    IMPRESSION:    First hallux soft tissue swelling with bandage artifact. Underlying osseous structures intact.    < end of copied text >  < from: US Duplex Venous Lower Ext Ltd, Right (21 @ 15:57) >    EXAM:  US DPLX LWR EXT VEINS LTD RT                            PROCEDURE DATE:  2021          INTERPRETATION:  CLINICAL INFORMATION: Right leg edema and swelling    COMPARISON: None available.    TECHNIQUE: Duplex sonography of the RIGHT LOWER extremity veins with color and spectral Doppler, with and without compression.    FINDINGS:    There is normal compressibility of the right common femoral, femoral and popliteal veins.  Doppler examination shows normal spontaneous and phasic flow.    No calf vein thrombosis is detected.    IMPRESSION:  No evidence of right lower extremity deep venous thrombosis.    < end of copied text >      Imaging Personally Reviewed:  [ ] YES  [ ] NO

## 2021-08-06 NOTE — PROGRESS NOTE ADULT - PROBLEM SELECTOR PLAN 3
-controlled, On Coreg, Irbesartan and Amlodipine at home   -cont Coreg with parameters, add home regimen if BP trends up   -mon BP

## 2021-08-06 NOTE — PROGRESS NOTE ADULT - ASSESSMENT
A: R great toe abscess     P:  Patient evaluated, chart reviewed  Labs reviewed   Xrays pending    Cultures pending   Ordered ESR/CRP  Recommend MRI per medicine   Dressed wound with Betadine, DSD, ACE  Patient to keep dressing clean, dry and intact   Patient to stay heel WB R foot   Recommend ID consult for IV Abx  Discussed with Attending Dr. Cruz and seen bedside with Dr. Wheat.

## 2021-08-06 NOTE — PROGRESS NOTE ADULT - SUBJECTIVE AND OBJECTIVE BOX
Patient is a 76y old  Female who presents with a chief complaint of skin infection (05 Aug 2021 19:29)    pt seen in icu [  ], reg med floor [   ], bed [  ], chair at bedside [   ], a+o x3 [  ], lethargic [  ],  nad [  ]    pereira [  ], ngt [  ], peg [  ], et tube [  ], cent line [  ], picc line [  ]        Allergies    No Known Allergies        Vitals    T(F): 98.6 (08-06-21 @ 04:43), Max: 98.6 (08-05-21 @ 16:20)  HR: 80 (08-06-21 @ 04:43) (66 - 80)  BP: 137/74 (08-06-21 @ 04:43) (109/63 - 137/74)  RR: 18 (08-06-21 @ 04:43) (18 - 18)  SpO2: 94% (08-06-21 @ 04:43) (94% - 96%)  Wt(kg): --  CAPILLARY BLOOD GLUCOSE          Labs                          10.8   7.09  )-----------( 249      ( 06 Aug 2021 06:03 )             34.0       08-06    141  |  109<H>  |  18  ----------------------------<  123<H>  4.3   |  26  |  0.66    Ca    8.5      06 Aug 2021 06:03  Phos  3.7     08-06  Mg     1.9     08-06    TPro  6.9  /  Alb  2.9<L>  /  TBili  0.2  /  DBili  x   /  AST  15  /  ALT  43  /  AlkPhos  98  08-06                Radiology Results      Meds    MEDICATIONS  (STANDING):  ampicillin/sulbactam  IVPB      ampicillin/sulbactam  IVPB 3 Gram(s) IV Intermittent every 6 hours  ascorbic acid 500 milliGRAM(s) Oral daily  carvedilol 25 milliGRAM(s) Oral every 12 hours  ferrous    sulfate 325 milliGRAM(s) Oral three times a day  gabapentin 300 milliGRAM(s) Oral two times a day  heparin   Injectable 5000 Unit(s) SubCutaneous every 8 hours  levothyroxine 75 MICROGram(s) Oral daily  pantoprazole    Tablet 40 milliGRAM(s) Oral before breakfast  sertraline 50 milliGRAM(s) Oral daily      MEDICATIONS  (PRN):  zolpidem 5 milliGRAM(s) Oral at bedtime PRN Insomnia  zolpidem 5 milliGRAM(s) Oral at bedtime PRN Insomnia      Physical Exam    Neuro :  no focal deficits  Respiratory: CTA B/L  CV: RRR, S1S2, no murmurs,   Abdominal: Soft, NT, ND +BS,  Extremities: No edema, + peripheral pulses    ASSESSMENT    Cutaneous abscess of right foot    History of hypertension    Hypercholesterolemia    Myocardial infarction syndrome    Osteoarthritis    Hypothyroid    CAD (coronary artery disease)    Depression    Hyperlipidemia    Anemia    Cholecystitis    History of angioplasty    History of shoulder surgery    H/O total knee replacement, right        PLAN     Patient is a 76y old  Female who presents with a chief complaint of skin infection (05 Aug 2021 19:29)    pt seen in ed [ x ], reg med floor [   ], bed [x  ], chair at bedside [   ], a+o x3 [ x ], lethargic [  ],  nad [ x ]      Allergies    No Known Allergies        Vitals    T(F): 98.6 (08-06-21 @ 04:43), Max: 98.6 (08-05-21 @ 16:20)  HR: 80 (08-06-21 @ 04:43) (66 - 80)  BP: 137/74 (08-06-21 @ 04:43) (109/63 - 137/74)  RR: 18 (08-06-21 @ 04:43) (18 - 18)  SpO2: 94% (08-06-21 @ 04:43) (94% - 96%)  Wt(kg): --  CAPILLARY BLOOD GLUCOSE          Labs                          10.8   7.09  )-----------( 249      ( 06 Aug 2021 06:03 )             34.0       08-06    141  |  109<H>  |  18  ----------------------------<  123<H>  4.3   |  26  |  0.66    Ca    8.5      06 Aug 2021 06:03  Phos  3.7     08-06  Mg     1.9     08-06    TPro  6.9  /  Alb  2.9<L>  /  TBili  0.2  /  DBili  x   /  AST  15  /  ALT  43  /  AlkPhos  98  08-06                Radiology Results      Meds    MEDICATIONS  (STANDING):  ampicillin/sulbactam  IVPB      ampicillin/sulbactam  IVPB 3 Gram(s) IV Intermittent every 6 hours  ascorbic acid 500 milliGRAM(s) Oral daily  carvedilol 25 milliGRAM(s) Oral every 12 hours  ferrous    sulfate 325 milliGRAM(s) Oral three times a day  gabapentin 300 milliGRAM(s) Oral two times a day  heparin   Injectable 5000 Unit(s) SubCutaneous every 8 hours  levothyroxine 75 MICROGram(s) Oral daily  pantoprazole    Tablet 40 milliGRAM(s) Oral before breakfast  sertraline 50 milliGRAM(s) Oral daily      MEDICATIONS  (PRN):  zolpidem 5 milliGRAM(s) Oral at bedtime PRN Insomnia  zolpidem 5 milliGRAM(s) Oral at bedtime PRN Insomnia      Physical Exam    Neuro :  no focal deficits  Respiratory: CTA B/L  CV: RRR, S1S2, no murmurs,   Abdominal: Soft, NT, ND +BS,  Extremities: No edema, + peripheral pulses, right great toe dressing cdi      ASSESSMENT       right foot cellulitis with abcess,   r/o osteo,   h/o CAD,   HTN,   HLD,   hypothyroidism,  Osteoarthritis  Depression  Anemia  s/p angioplasty  s/p shoulder surgery  s/p total knee replacement, right        PLAN      unasyn,   id cons  f/u wound cx   f/u mri to r/o osteo  podiatry f/u .  Using a sterile 11blade, a small incision was made to the R great toe down to and including subcutaneous tissue  The wound was flushed with saline/betadine mixture and packed with Iodoform  Dressed wound with DSD, ACE  Patient to keep dressing clean, dry and intact   Patient to stay heel WB R foot   cont current meds

## 2021-08-06 NOTE — DISCHARGE NOTE PROVIDER - NSDCMRMEDTOKEN_GEN_ALL_CORE_FT
ascorbic acid 500 mg oral tablet: 1 tab(s) orally 2 times a day  carvedilol 25 mg oral tablet: 1 tab(s) orally 2 times a day  docusate sodium 100 mg oral capsule: 1 cap(s) orally 3 times a day  doxazosin 1 mg oral tablet: 1 tab(s) orally once a day (at bedtime)  ferrous sulfate 325 mg (65 mg elemental iron) oral tablet: 1 tab(s) orally 3 times a day  furosemide 40 mg oral tablet: 1 tab(s) orally once a day  gabapentin 300 mg oral capsule: 1 cap(s) orally 2 times a day  gemfibrozil 600 mg oral tablet: 1 tab(s) orally once a day (at bedtime)  irbesartan 150 mg oral tablet: 1 tab(s) orally once a day  levothyroxine 75 mcg (0.075 mg) oral tablet: 1 tab(s) orally once a day  Norvasc 10 mg oral tablet: 1 tab(s) orally once a day  omeprazole 40 mg oral delayed release capsule: 1 cap(s) orally once a day  Plavix 75 mg oral tablet: 1 tab(s) orally once a day  sertraline 50 mg oral tablet: 1 tab(s) orally once a day  zolpidem 10 mg oral tablet: 1 tab(s) orally once a day (at bedtime)   ascorbic acid 500 mg oral tablet: 1 tab(s) orally 2 times a day  carvedilol 25 mg oral tablet: 1 tab(s) orally 2 times a day  docusate sodium 100 mg oral capsule: 1 cap(s) orally 3 times a day  doxazosin 1 mg oral tablet: 1 tab(s) orally once a day (at bedtime)  ferrous sulfate 325 mg (65 mg elemental iron) oral tablet: 1 tab(s) orally 3 times a day  furosemide 40 mg oral tablet: 1 tab(s) orally once a day  gabapentin 300 mg oral capsule: 1 cap(s) orally 2 times a day  gemfibrozil 600 mg oral tablet: 1 tab(s) orally once a day (at bedtime)  irbesartan 150 mg oral tablet: 1 tab(s) orally once a day  levothyroxine 75 mcg (0.075 mg) oral tablet: 1 tab(s) orally once a day  Norvasc 10 mg oral tablet: 1 tab(s) orally once a day  omeprazole 40 mg oral delayed release capsule: 1 cap(s) orally once a day  Plavix 75 mg oral tablet: 1 tab(s) orally once a day  sertraline 50 mg oral tablet: 1 tab(s) orally once a day  zolpidem 10 mg oral tablet: 1 tab(s) orally once a day (at bedtime), As Needed   acetaminophen 325 mg oral tablet: 2 tab(s) orally every 6 hours, As needed Moderate Pain   ascorbic acid 500 mg oral tablet: 1 tab(s) orally 2 times a day  Bactrim  mg-160 mg oral tablet: 1 tab(s) orally every 12 hours  until 8/20/21  carvedilol 25 mg oral tablet: 1 tab(s) orally 2 times a day  ferrous sulfate 325 mg (65 mg elemental iron) oral tablet: 1 tab(s) orally 3 times a day  gabapentin 300 mg oral capsule: 1 cap(s) orally 2 times a day  irbesartan 150 mg oral tablet: 1 tab(s) orally once a day  levothyroxine 75 mcg (0.075 mg) oral tablet: 1 tab(s) orally once a day  Norvasc 10 mg oral tablet: 1 tab(s) orally once a day  omeprazole 40 mg oral delayed release capsule: 1 cap(s) orally once a day  Plavix 75 mg oral tablet: 1 tab(s) orally once a day  Restasis 0.05% ophthalmic emulsion: 1 drop(s) to each affected eye every 12 hours  sertraline 100 mg oral tablet: 1 tab(s) orally once a day  zolpidem 10 mg oral tablet: 1 tab(s) orally once a day (at bedtime), As Needed

## 2021-08-06 NOTE — DISCHARGE NOTE PROVIDER - CARE PROVIDER_API CALL
JOHN PACHECO  Internal Medicine  [address] [place] [id number]  Phone: [phone]  Fax: [phone]  Follow Up Time: 1 week

## 2021-08-06 NOTE — PROGRESS NOTE ADULT - PROBLEM SELECTOR PLAN 1
-p/w right leg pain and swelling  -cont Unasyn   -LE US negative DVT   -f/u cultures   -f/u MRI of right foot   -local wound care as per Podiatry   -Podiatry following   -ID Dr. Garces

## 2021-08-06 NOTE — PROGRESS NOTE ADULT - ASSESSMENT
76F   female from home with PMHx of CAD, HTN, HLD, hypothyroidism, who came to the ED for RLE swelling and tenderness   Admitted to medicine for RLE Cellulitis, started on abx. cultures sent   Podiatry on board  and recommended MR of right foot   ID Dr. Garces consulted

## 2021-08-06 NOTE — DISCHARGE NOTE PROVIDER - PROVIDER TOKENS
- Free Text/Narrative


Note: 


Procedure note: Circumcision with dorsal penile block


Date: 06/14/20


Indications: Parental Request





Baby is full term and is stable with plan to be discharged home tomorrow. No FH 

of bleeding disorder. Baby already received Vit-K. No contraindication to 

circumcision noted on h/o or exam. 





Informed Consent: His parents were explained the procedure, risks and benefits. 

The benefits include decreased risk of UTI/STI, decreased risk of penile cancer 

and hygiene. The risks include bleeding, infection, anesthesia complications, 

poor cosmetic result, meatal stenosis and damage to the penis. Alternatives to 

procedure including adult circumcision and not doing it at all were also 

discussed. Questions were answered and both parents verbalized understanding. A 

consent form was signed.





Time out performed with JUAN Durán at 10:50 am





Anesthesia: 0.8ml 1% lidocaine (Dorsal penile block)





Procedure: Baby was properly restrained in circumcision holding table. 0.8 ml 

of 1% lidocaine was injected, 0.4 ml at 2 and 10 o'clock at base of shaft 

respectively. Area was then prepped with betadine and draped. The foreskin is 

grasped on both sides of the midline with two hemostats.  The adhesions between 

the foreskin and glans of the penis were taken down.  A hemostat is used to 

create a crush line on the dorsal aspect.  A dorsal slit was made. The foreskin 

was then retracted to expose the glans. Any remaining adhesions were taken 

down. A Gomco (size: 1.3) was then used to remove the foreskin. No bleeding or 

abnormalities were noted. A dressing of triple antibiotic cream with gauze was 

gently applied.





Estimated blood loss: less than 1 ml





Parental Instructions: The parents were counseled about the healing process. 

Gentle retraction of the shaft skin may be necessary if it encroaches on the 

glans. Petroleum jelly/antibiotic cream may be applied liberally at diaper 

changes until the glans re-epithelializes. Parents understood and agree with 

plan





Disposition: Stable in nursery. Discharge home after he urinates or as per 

attending provider instructions. PROVIDER:[TOKEN:[[id number]],FOLLOWUP:[1 week]]

## 2021-08-07 LAB
-  AMPICILLIN/SULBACTAM: SIGNIFICANT CHANGE UP
-  CEFAZOLIN: SIGNIFICANT CHANGE UP
-  CLINDAMYCIN: SIGNIFICANT CHANGE UP
-  DAPTOMYCIN: SIGNIFICANT CHANGE UP
-  ERYTHROMYCIN: SIGNIFICANT CHANGE UP
-  GENTAMICIN: SIGNIFICANT CHANGE UP
-  LINEZOLID: SIGNIFICANT CHANGE UP
-  OXACILLIN: SIGNIFICANT CHANGE UP
-  PENICILLIN: SIGNIFICANT CHANGE UP
-  RIFAMPIN: SIGNIFICANT CHANGE UP
-  TETRACYCLINE: SIGNIFICANT CHANGE UP
-  TRIMETHOPRIM/SULFAMETHOXAZOLE: SIGNIFICANT CHANGE UP
-  VANCOMYCIN: SIGNIFICANT CHANGE UP
ALBUMIN SERPL ELPH-MCNC: 2.7 G/DL — LOW (ref 3.5–5)
ALP SERPL-CCNC: 83 U/L — SIGNIFICANT CHANGE UP (ref 40–120)
ALT FLD-CCNC: 34 U/L DA — SIGNIFICANT CHANGE UP (ref 10–60)
ANION GAP SERPL CALC-SCNC: 2 MMOL/L — LOW (ref 5–17)
AST SERPL-CCNC: 12 U/L — SIGNIFICANT CHANGE UP (ref 10–40)
BILIRUB SERPL-MCNC: 0.1 MG/DL — LOW (ref 0.2–1.2)
BUN SERPL-MCNC: 15 MG/DL — SIGNIFICANT CHANGE UP (ref 7–18)
CALCIUM SERPL-MCNC: 8.6 MG/DL — SIGNIFICANT CHANGE UP (ref 8.4–10.5)
CHLORIDE SERPL-SCNC: 108 MMOL/L — SIGNIFICANT CHANGE UP (ref 96–108)
CO2 SERPL-SCNC: 31 MMOL/L — SIGNIFICANT CHANGE UP (ref 22–31)
CREAT SERPL-MCNC: 0.78 MG/DL — SIGNIFICANT CHANGE UP (ref 0.5–1.3)
GLUCOSE SERPL-MCNC: 119 MG/DL — HIGH (ref 70–99)
HCT VFR BLD CALC: 33.1 % — LOW (ref 34.5–45)
HGB BLD-MCNC: 10.2 G/DL — LOW (ref 11.5–15.5)
MCHC RBC-ENTMCNC: 30.4 PG — SIGNIFICANT CHANGE UP (ref 27–34)
MCHC RBC-ENTMCNC: 30.8 GM/DL — LOW (ref 32–36)
MCV RBC AUTO: 98.8 FL — SIGNIFICANT CHANGE UP (ref 80–100)
METHOD TYPE: SIGNIFICANT CHANGE UP
NRBC # BLD: 0 /100 WBCS — SIGNIFICANT CHANGE UP (ref 0–0)
PLATELET # BLD AUTO: 227 K/UL — SIGNIFICANT CHANGE UP (ref 150–400)
POTASSIUM SERPL-MCNC: 4.6 MMOL/L — SIGNIFICANT CHANGE UP (ref 3.5–5.3)
POTASSIUM SERPL-SCNC: 4.6 MMOL/L — SIGNIFICANT CHANGE UP (ref 3.5–5.3)
PROT SERPL-MCNC: 6.4 G/DL — SIGNIFICANT CHANGE UP (ref 6–8.3)
RBC # BLD: 3.35 M/UL — LOW (ref 3.8–5.2)
RBC # FLD: 14 % — SIGNIFICANT CHANGE UP (ref 10.3–14.5)
SODIUM SERPL-SCNC: 141 MMOL/L — SIGNIFICANT CHANGE UP (ref 135–145)
WBC # BLD: 5.56 K/UL — SIGNIFICANT CHANGE UP (ref 3.8–10.5)
WBC # FLD AUTO: 5.56 K/UL — SIGNIFICANT CHANGE UP (ref 3.8–10.5)

## 2021-08-07 RX ORDER — KETOROLAC TROMETHAMINE 30 MG/ML
15 SYRINGE (ML) INJECTION ONCE
Refills: 0 | Status: DISCONTINUED | OUTPATIENT
Start: 2021-08-07 | End: 2021-08-07

## 2021-08-07 RX ORDER — GUAIFENESIN/DEXTROMETHORPHAN 600MG-30MG
10 TABLET, EXTENDED RELEASE 12 HR ORAL EVERY 6 HOURS
Refills: 0 | Status: DISCONTINUED | OUTPATIENT
Start: 2021-08-07 | End: 2021-08-09

## 2021-08-07 RX ADMIN — Medication 15 MILLIGRAM(S): at 23:49

## 2021-08-07 RX ADMIN — SERTRALINE 50 MILLIGRAM(S): 25 TABLET, FILM COATED ORAL at 12:04

## 2021-08-07 RX ADMIN — AMPICILLIN SODIUM AND SULBACTAM SODIUM 200 GRAM(S): 250; 125 INJECTION, POWDER, FOR SUSPENSION INTRAMUSCULAR; INTRAVENOUS at 19:43

## 2021-08-07 RX ADMIN — Medication 325 MILLIGRAM(S): at 21:58

## 2021-08-07 RX ADMIN — GABAPENTIN 300 MILLIGRAM(S): 400 CAPSULE ORAL at 06:14

## 2021-08-07 RX ADMIN — Medication 650 MILLIGRAM(S): at 12:04

## 2021-08-07 RX ADMIN — HEPARIN SODIUM 5000 UNIT(S): 5000 INJECTION INTRAVENOUS; SUBCUTANEOUS at 14:08

## 2021-08-07 RX ADMIN — Medication 650 MILLIGRAM(S): at 19:43

## 2021-08-07 RX ADMIN — Medication 10 MILLILITER(S): at 18:17

## 2021-08-07 RX ADMIN — Medication 250 MILLIGRAM(S): at 00:59

## 2021-08-07 RX ADMIN — CARVEDILOL PHOSPHATE 25 MILLIGRAM(S): 80 CAPSULE, EXTENDED RELEASE ORAL at 06:07

## 2021-08-07 RX ADMIN — Medication 75 MICROGRAM(S): at 06:07

## 2021-08-07 RX ADMIN — PANTOPRAZOLE SODIUM 40 MILLIGRAM(S): 20 TABLET, DELAYED RELEASE ORAL at 08:07

## 2021-08-07 RX ADMIN — Medication 325 MILLIGRAM(S): at 14:08

## 2021-08-07 RX ADMIN — Medication 500 MILLIGRAM(S): at 12:04

## 2021-08-07 RX ADMIN — CARVEDILOL PHOSPHATE 25 MILLIGRAM(S): 80 CAPSULE, EXTENDED RELEASE ORAL at 17:50

## 2021-08-07 RX ADMIN — AMPICILLIN SODIUM AND SULBACTAM SODIUM 200 GRAM(S): 250; 125 INJECTION, POWDER, FOR SUSPENSION INTRAMUSCULAR; INTRAVENOUS at 00:00

## 2021-08-07 RX ADMIN — Medication 10 MILLILITER(S): at 23:22

## 2021-08-07 RX ADMIN — HEPARIN SODIUM 5000 UNIT(S): 5000 INJECTION INTRAVENOUS; SUBCUTANEOUS at 06:07

## 2021-08-07 RX ADMIN — Medication 650 MILLIGRAM(S): at 13:00

## 2021-08-07 RX ADMIN — HEPARIN SODIUM 5000 UNIT(S): 5000 INJECTION INTRAVENOUS; SUBCUTANEOUS at 21:58

## 2021-08-07 RX ADMIN — Medication 650 MILLIGRAM(S): at 20:13

## 2021-08-07 RX ADMIN — AMPICILLIN SODIUM AND SULBACTAM SODIUM 200 GRAM(S): 250; 125 INJECTION, POWDER, FOR SUSPENSION INTRAMUSCULAR; INTRAVENOUS at 08:08

## 2021-08-07 RX ADMIN — GABAPENTIN 300 MILLIGRAM(S): 400 CAPSULE ORAL at 17:50

## 2021-08-07 RX ADMIN — AMPICILLIN SODIUM AND SULBACTAM SODIUM 200 GRAM(S): 250; 125 INJECTION, POWDER, FOR SUSPENSION INTRAMUSCULAR; INTRAVENOUS at 14:09

## 2021-08-07 RX ADMIN — ZOLPIDEM TARTRATE 5 MILLIGRAM(S): 10 TABLET ORAL at 23:35

## 2021-08-07 RX ADMIN — Medication 250 MILLIGRAM(S): at 12:04

## 2021-08-07 RX ADMIN — Medication 250 MILLIGRAM(S): at 21:58

## 2021-08-07 RX ADMIN — Medication 15 MILLIGRAM(S): at 23:19

## 2021-08-07 RX ADMIN — Medication 325 MILLIGRAM(S): at 06:07

## 2021-08-07 NOTE — PROGRESS NOTE ADULT - SUBJECTIVE AND OBJECTIVE BOX
Patient is a 76y old  Female who presents with a chief complaint of skin infection (06 Aug 2021 19:42)    pt seen in ed [ x ], reg med floor [   ], bed [x  ], chair at bedside [   ], a+o x3 [ x ], lethargic [  ],    nad [ x ]        Allergies    No Known Allergies        Vitals    T(F): 97.9 (08-07-21 @ 06:10), Max: 98.6 (08-06-21 @ 12:43)  HR: 64 (08-07-21 @ 06:10) (57 - 76)  BP: 116/65 (08-07-21 @ 06:10) (113/59 - 138/66)  RR: 18 (08-07-21 @ 06:10) (17 - 18)  SpO2: 95% (08-07-21 @ 06:10) (94% - 97%)  Wt(kg): --  CAPILLARY BLOOD GLUCOSE          Labs                          10.8   7.09  )-----------( 249      ( 06 Aug 2021 06:03 )             34.0       08-06    141  |  109<H>  |  18  ----------------------------<  123<H>  4.3   |  26  |  0.66    Ca    8.5      06 Aug 2021 06:03  Phos  3.7     08-06  Mg     1.9     08-06    TPro  6.9  /  Alb  2.9<L>  /  TBili  0.2  /  DBili  x   /  AST  15  /  ALT  43  /  AlkPhos  98  08-06            Clean Catch Clean Catch (Midstream)  08-06 @ 03:31   <10,000 CFU/mL Normal Urogenital Kiersten  --  --      .Surgical Swab R great toe abscess  08-05 @ 22:11   Numerous Staphylococcus aureus  --  --      .Blood Blood-Peripheral  08-05 @ 18:31   No growth to date.  --  --          Radiology Results      Meds    MEDICATIONS  (STANDING):  ampicillin/sulbactam  IVPB      ampicillin/sulbactam  IVPB 3 Gram(s) IV Intermittent every 6 hours  ascorbic acid 500 milliGRAM(s) Oral daily  carvedilol 25 milliGRAM(s) Oral every 12 hours  ferrous    sulfate 325 milliGRAM(s) Oral three times a day  gabapentin 300 milliGRAM(s) Oral two times a day  heparin   Injectable 5000 Unit(s) SubCutaneous every 8 hours  levothyroxine 75 MICROGram(s) Oral daily  pantoprazole    Tablet 40 milliGRAM(s) Oral before breakfast  sertraline 50 milliGRAM(s) Oral daily  vancomycin  IVPB 1000 milliGRAM(s) IV Intermittent every 12 hours      MEDICATIONS  (PRN):  acetaminophen   Tablet .. 650 milliGRAM(s) Oral every 6 hours PRN Mild Pain (1 - 3), Moderate Pain (4 - 6)  zolpidem 5 milliGRAM(s) Oral at bedtime PRN Insomnia  zolpidem 5 milliGRAM(s) Oral at bedtime PRN Insomnia      Physical Exam    Neuro :  no focal deficits  Respiratory: CTA B/L  CV: RRR, S1S2, no murmurs,   Abdominal: Soft, NT, ND +BS,  Extremities: No edema, + peripheral pulses, right great toe dressing cdi      ASSESSMENT       right foot cellulitis with abcess,   r/o osteo,   h/o CAD,   HTN,   HLD,   hypothyroidism,  Osteoarthritis  Depression  Anemia  s/p angioplasty  s/p shoulder surgery  s/p total knee replacement, right        PLAN      unasyn,   id cons  f/u wound cx   f/u mri to r/o osteo  podiatry f/u .  Using a sterile 11blade, a small incision was made to the R great toe down to and including subcutaneous tissue  The wound was flushed with saline/betadine mixture and packed with Iodoform  Dressed wound with DSD, ACE  Patient to keep dressing clean, dry and intact   Patient to stay heel WB R foot   cont current meds             Patient is a 76y old  Female who presents with a chief complaint of skin infection (06 Aug 2021 19:42)    pt seen in ed [ x ], reg med floor [   ], bed [x  ], chair at bedside [   ], a+o x3 [ x ], lethargic [  ],    nad [ x ]        Allergies    No Known Allergies        Vitals    T(F): 97.9 (08-07-21 @ 06:10), Max: 98.6 (08-06-21 @ 12:43)  HR: 64 (08-07-21 @ 06:10) (57 - 76)  BP: 116/65 (08-07-21 @ 06:10) (113/59 - 138/66)  RR: 18 (08-07-21 @ 06:10) (17 - 18)  SpO2: 95% (08-07-21 @ 06:10) (94% - 97%)  Wt(kg): --  CAPILLARY BLOOD GLUCOSE          Labs                          10.8   7.09  )-----------( 249      ( 06 Aug 2021 06:03 )             34.0       08-06    141  |  109<H>  |  18  ----------------------------<  123<H>  4.3   |  26  |  0.66    Ca    8.5      06 Aug 2021 06:03  Phos  3.7     08-06  Mg     1.9     08-06    TPro  6.9  /  Alb  2.9<L>  /  TBili  0.2  /  DBili  x   /  AST  15  /  ALT  43  /  AlkPhos  98  08-06            Clean Catch Clean Catch (Midstream)  08-06 @ 03:31   <10,000 CFU/mL Normal Urogenital Kiersten  --  --      .Surgical Swab R great toe abscess  08-05 @ 22:11   Numerous Staphylococcus aureus  --  --      .Blood Blood-Peripheral  08-05 @ 18:31   No growth to date.  --  --          Radiology Results    < from: MR Foot No Cont, Right (08.06.21 @ 16:58) >  IMPRESSION: Cutaneous wound of the dorsal hallux at the level of the proximal phalanx with adjacent cellulitic change. No evidence of adjacent osteomyelitis.    < end of copied text >        Meds    MEDICATIONS  (STANDING):  ampicillin/sulbactam  IVPB      ampicillin/sulbactam  IVPB 3 Gram(s) IV Intermittent every 6 hours  ascorbic acid 500 milliGRAM(s) Oral daily  carvedilol 25 milliGRAM(s) Oral every 12 hours  ferrous    sulfate 325 milliGRAM(s) Oral three times a day  gabapentin 300 milliGRAM(s) Oral two times a day  heparin   Injectable 5000 Unit(s) SubCutaneous every 8 hours  levothyroxine 75 MICROGram(s) Oral daily  pantoprazole    Tablet 40 milliGRAM(s) Oral before breakfast  sertraline 50 milliGRAM(s) Oral daily  vancomycin  IVPB 1000 milliGRAM(s) IV Intermittent every 12 hours      MEDICATIONS  (PRN):  acetaminophen   Tablet .. 650 milliGRAM(s) Oral every 6 hours PRN Mild Pain (1 - 3), Moderate Pain (4 - 6)  zolpidem 5 milliGRAM(s) Oral at bedtime PRN Insomnia  zolpidem 5 milliGRAM(s) Oral at bedtime PRN Insomnia      Physical Exam    Neuro :  no focal deficits  Respiratory: CTA B/L  CV: RRR, S1S2, no murmurs,   Abdominal: Soft, NT, ND +BS,  Extremities: No edema, + peripheral pulses, right great toe dressing cdi      ASSESSMENT      right foot cellulitis with abcess,   osteo r/o,   h/o CAD,   HTN,   HLD,   hypothyroidism,  Osteoarthritis  Depression  Anemia  s/p angioplasty  s/p shoulder surgery  s/p total knee replacement, right        PLAN      wound cx with staph  mri right foot with Cutaneous wound of the dorsal hallux at the level of the proximal phalanx with adjacent cellulitic change. No evidence of adjacent osteomyelitis noted above.   id f/u   Continue Unasyn and Vancomycin until work up is done  podiatry f/u .  Dressed wound with DSD, ACE  Patient to keep dressing clean, dry and intact   Patient to stay heel WB R foot   cont current meds

## 2021-08-07 NOTE — PROGRESS NOTE ADULT - ASSESSMENT
A: R great toe abscess     P:  Patient evaluated, chart reviewed  Xrays reviewed  Labs reviewed   Monitor labs  Recommend MRI with Contrast per medicine   Dressed wound with Betadine, DSD, ACE  Patient to keep dressing clean, dry and intact   Patient to stay heel WB R foot   Continue Antibiotics per medicine team  Discussed with Attending Dr. Cruz and seen bedside with Dr. Wheat.

## 2021-08-07 NOTE — PROGRESS NOTE ADULT - SUBJECTIVE AND OBJECTIVE BOX
Patient is seen and examined at the bed side, is afebrile. The Blood cultures have no growth to date and wound culture grew MRSA. The MRI of Right foot shows No Osteomyelitis.       REVIEW OF SYSTEMS: All other review systems are negative      ALLERGIES: No Known Allergies      Vital Signs Last 24 Hrs  T(C): 36.7 (07 Aug 2021 13:56), Max: 36.7 (07 Aug 2021 13:56)  T(F): 98 (07 Aug 2021 13:56), Max: 98 (07 Aug 2021 13:56)  HR: 78 (07 Aug 2021 17:42) (64 - 78)  BP: 159/78 (07 Aug 2021 17:42) (116/65 - 159/78)  BP(mean): --  RR: 18 (07 Aug 2021 13:56) (18 - 18)  SpO2: 97% (07 Aug 2021 13:56) (95% - 97%)      PHYSICAL EXAM:  GENERAL: Not in distress   CHEST/LUNG: Not using accessory muscles   HEART: s1 and s2 present  ABDOMEN:  Nontender and  Nondistended  EXTREMITIES: Right foot bandage in placed   CNS: Awake and Alert      LABS:                        10.2   5.56  )-----------( 227      ( 07 Aug 2021 06:33 )             33.1                           10.8   7.09  )-----------( 249      ( 06 Aug 2021 06:03 )             34.0       08-07    141  |  108  |  15  ----------------------------<  119<H>  4.6   |  31  |  0.78    Ca    8.6      07 Aug 2021 06:33  Phos  3.7     08-06  Mg     1.9     08-06    TPro  6.4  /  Alb  2.7<L>  /  TBili  0.1<L>  /  DBili  x   /  AST  12  /  ALT  34  /  AlkPhos  83  08-07    08-06    141  |  109<H>  |  18  ----------------------------<  123<H>  4.3   |  26  |  0.66    Ca    8.5      06 Aug 2021 06:03  Phos  3.7     08-06  Mg     1.9     08-06    TPro  6.9  /  Alb  2.9<L>  /  TBili  0.2  /  DBili  x   /  AST  15  /  ALT  43  /  AlkPhos  98  08-    PT/INR - ( 05 Aug 2021 14:05 )   PT: 12.4 sec;   INR: 1.04 ratio      PTT - ( 05 Aug 2021 14:05 )  PTT:31.5 sec        Urinalysis Basic - ( 05 Aug 2021 21:22 )  Color: Yellow / Appearance: Clear / S.010 / pH: x  Gluc: x / Ketone: Negative  / Bili: Negative / Urobili: Negative   Blood: x / Protein: Negative / Nitrite: Negative   Leuk Esterase: Moderate / RBC: 2-5 /HPF / WBC 11-25 /HPF   Sq Epi: x / Non Sq Epi: Many /HPF / Bacteria: Moderate /HPF        MEDICATIONS  (STANDING):    ampicillin/sulbactam  IVPB      ampicillin/sulbactam  IVPB 3 Gram(s) IV Intermittent every 6 hours  ascorbic acid 500 milliGRAM(s) Oral daily  carvedilol 25 milliGRAM(s) Oral every 12 hours  ferrous    sulfate 325 milliGRAM(s) Oral three times a day  gabapentin 300 milliGRAM(s) Oral two times a day  guaifenesin/dextromethorphan Oral Liquid 10 milliLiter(s) Oral every 6 hours  heparin   Injectable 5000 Unit(s) SubCutaneous every 8 hours  levothyroxine 75 MICROGram(s) Oral daily  pantoprazole    Tablet 40 milliGRAM(s) Oral before breakfast  sertraline 50 milliGRAM(s) Oral daily  vancomycin  IVPB 1000 milliGRAM(s) IV Intermittent every 12 hours        RADIOLOGY & ADDITIONAL TESTS:    21: MR Foot No Cont, Right (21 @ 16:58) : : Cutaneous wound of the dorsal hallux at the level of the proximal phalanx with adjacent cellulitic change. No evidence of adjacent osteomyelitis.    21: Xray Foot AP + Lateral + Oblique, Right (21 @ 09:04) First hallux soft tissue swelling with bandage artifact. Underlying osseous structures intact.      21: US Duplex Venous Lower Ext Ltd, Right (21 @ 15:57) No evidence of right lower extremity deep venous thrombosis.       MICROBIOLOGY DATA:    Culture - Urine (21 @ 03:31)   Specimen Source: Clean Catch Clean Catch (Midstream)   Culture Results: <10,000 CFU/mL Normal Urogenital Kiersten     Culture - Surgical Swab (21 @ 22:11)   - Ampicillin/Sulbactam: R <=8/4   - Cefazolin: R <=4   - Clindamycin: S <=0.25   - Daptomycin: S 0.5   - Erythromycin: R >4   - Gentamicin: S <=1 Should not be used as monotherapy   - Linezolid: S 2   - Oxacillin: R >2   - Penicillin: R >8   - RIF- Rifampin: S <=1 Should not be used as monotherapy   - Tetra/Doxy: R >8   - Trimethoprim/Sulfamethoxazole: S <=0.5/9.5   - Vancomycin: S 2   Specimen Source: .Surgical Swab R great toe abscess   Culture Results:   Numerous Methicillin Resistant Staphylococcus aureus   Organism Identification: Methicillin resistant Staphylococcus aureus     Culture - Blood (21 @ 18:31)   Specimen Source: .Blood Blood-Peripheral   Culture Results: No growth to date.     Culture - Blood (21 @ 18:31)   Specimen Source: .Blood Blood-Peripheral   Culture Results: No growth to date.     COVID-19 Bhargav Domain Antibody (21 @ 09:57)   COVID-19 Bhargav Domain Antibody Result: 148.00:

## 2021-08-07 NOTE — PROGRESS NOTE ADULT - ASSESSMENT
Patient is a 76y old  Female from home with PMHx of CAD, HTN, HLD, hypothyroidism, who came to the ER for evaluation of RLE swelling and tenderness. Pt endorses that a week ago she was in DR and developed a sore on the dorsal surface of her first big toe. She popped it and started draining fluid. Few days later the affected area developed redness extended to her calf, associated with swelling and tenderness. Also endorses cough for 2 days, no sputum production. On admission, she has no fever or Leukocytosis. She has started on Unasyn and the ID consult requested to assist with further evaluation and antibiotic management.     # Right foot wound with cellulitis- wound culture grew MRSA. The MRI of Right foot shows No Osteomyelitis. -8/5/21      would recommend:    1. Discontinue Unasyn and continue Vancomycin to cover MRSA  2. Monitor and Keep Vancomycin level between 15 to 20  3. Keep RLE elevated   4. Wound care   5. Contact Isolation     Attending  Attestation:    Spent more than 45 minutes on total encounter, more than 50 % of the visit was spent counseling and/or coordinating care by the Attending physician.

## 2021-08-07 NOTE — PROGRESS NOTE ADULT - SUBJECTIVE AND OBJECTIVE BOX
Podiatry HPI Interval: Patient seen bedside resting comfortably. Patient speaks Amharic and needs . Patient states R great toe is still painful. No new events occurred overnight.  Patient denies N/V/F/C/SOB.     Podiatry HPI: Patient presents to the ED for R foot cellulitis. Patient states she went to the [place] where she had a blister on her R great toe. Patient states as the week went on the blister got worse and worse. Patient states doctor from [place] gave her cream to apply but has not helped. Patient states R great toe is painful to touch and she is unable to walk. Patient denies any trauma to area. Denies N/V/F/C/SOB. No other pedal complaints.         Patient is a 76y old  Female who presents with a chief complaint of     HPI:  Patient is a 76F with PMHx of CAD, HTN, HLD, hypothyroidism , anemia, PSH total r knee replacement  presenting with chief complaint of right leg swelling. Patient spent the past few months in the [place], just returning from the [place] today. 7 days ago, she developed a pimple on the dorsal surface of her first big toe. She popped it and started draining fluids. Blister returned and surrounding area started getting red. 5 days ago, she noted that the redness extended to her calf, and started getting swollen. Denied any fever, chills. Has cough for 2 days, no sputum production, SOB.  Denies nausea, vomiting, diarrhea, abdominal pain, SOB, chest pain, PRUITT, palpitations, dizziness, headache, cough, wheezing.       Ed course : patient received Rocephin  1gr X1, and 1 liter bolus , was seen by podietry , recommended no MRI needed given low suspicion for osteomyelitis      (05 Aug 2021 18:10)      Medications acetaminophen   Tablet .. 650 milliGRAM(s) Oral every 6 hours PRN  ampicillin/sulbactam  IVPB      ampicillin/sulbactam  IVPB 3 Gram(s) IV Intermittent every 6 hours  ascorbic acid 500 milliGRAM(s) Oral daily  carvedilol 25 milliGRAM(s) Oral every 12 hours  ferrous    sulfate 325 milliGRAM(s) Oral three times a day  gabapentin 300 milliGRAM(s) Oral two times a day  heparin   Injectable 5000 Unit(s) SubCutaneous every 8 hours  levothyroxine 75 MICROGram(s) Oral daily  pantoprazole    Tablet 40 milliGRAM(s) Oral before breakfast  sertraline 50 milliGRAM(s) Oral daily  vancomycin  IVPB 1000 milliGRAM(s) IV Intermittent every 12 hours  zolpidem 5 milliGRAM(s) Oral at bedtime PRN  zolpidem 5 milliGRAM(s) Oral at bedtime PRN    FHFamily history of cardiomyopathy (Mother)    Family history of coronary artery disease (Father, Sibling)    Family history of lung cancer (Sibling)    Family history of lung cancer (Father)    Family history of cardiomyopathy    Family history of coronary artery disease (Mother)    ,   PMHHistory of hypertension    Hypercholesterolemia    Myocardial infarction syndrome    Osteoarthritis    Hypothyroid    CAD (coronary artery disease)    Depression    Hyperlipidemia    Anemia       PSHCholecystitis    History of angioplasty    History of shoulder surgery    H/O total knee replacement, right        Labs                          10.2   5.56  )-----------( 227      ( 07 Aug 2021 06:33 )             33.1      08-07    141  |  108  |  15  ----------------------------<  119<H>  4.6   |  31  |  0.78    Ca    8.6      07 Aug 2021 06:33  Phos  3.7     08-06  Mg     1.9     08-06    TPro  6.4  /  Alb  2.7<L>  /  TBili  0.1<L>  /  DBili  x   /  AST  12  /  ALT  34  /  AlkPhos  83  08-07     Vital Signs Last 24 Hrs  T(C): 36.6 (07 Aug 2021 06:10), Max: 36.6 (07 Aug 2021 06:10)  T(F): 97.9 (07 Aug 2021 06:10), Max: 97.9 (07 Aug 2021 06:10)  HR: 64 (07 Aug 2021 06:10) (60 - 64)  BP: 116/65 (07 Aug 2021 06:10) (116/65 - 138/66)  BP(mean): --  RR: 18 (07 Aug 2021 06:10) (18 - 18)  SpO2: 95% (07 Aug 2021 06:10) (95% - 97%)  Sedimentation Rate, Erythrocyte: 6 mm/Hr (08-06-21 @ 06:03)         C-Reactive Protein, Serum: 32 mg/L (08-06-21 @ 10:02)  WBC Count: 5.56 K/uL (08-07-21 @ 06:33)    ROS  REVIEW OF SYSTEMS:    CONSTITUTIONAL: No fever, weight loss, or fatigue  EYES: No eye pain, visual disturbances, or discharge  ENMT:  No difficulty hearing, tinnitus, vertigo; No sinus or throat pain  NECK: No pain or stiffness  BREASTS: No pain, masses, or nipple discharge  RESPIRATORY: No cough, wheezing, chills or hemoptysis; No shortness of breath  CARDIOVASCULAR: No chest pain, palpitations, dizziness, or leg swelling  GASTROINTESTINAL: No abdominal or epigastric pain. No nausea, vomiting, or hematemesis; No diarrhea or constipation. No melena or hematochezia.  GENITOURINARY: No dysuria, frequency, hematuria, or incontinence  NEUROLOGICAL: No headaches, memory loss, loss of strength, numbness, or tremors  SKIN: No itching, burning, rashes, or lesions   LYMPH NODES: No enlarged glands  ENDOCRINE: No heat or cold intolerance; No hair loss  MUSCULOSKELETAL: No joint pain or swelling; No muscle, back, or extremity pain  PSYCHIATRIC: No depression, anxiety, mood swings, or difficulty sleeping  HEME/LYMPH: No easy bruising, or bleeding gums  ALLERY AND IMMUNOLOGIC: No hives or eczema      PHYSICAL EXAM  GEN: OFELIA MOSES is a pleasant well-nourished, well developed 76y Female in no acute distress, alert awake, and oriented to person, place and time.   LE Focused:  RLE   Vasc: Pulses palpable 2/4 R. CFT <3seconds to distal tuft R. Skin temp warm to warm proximal to distal R.   Derm: Erythema noted to R great toe extending proximally to midfoot dorsally. Wound noted to dorsal R great toe measuring 2cm x 1cm x 0.3 cm. 3 cc purulent drainage noted to wound upon expression. Mild fluctuance noted to R great toe wound. +PTB.    Neuro: Protective sensation intact   MSK: Pain on palpation to R great toe. Able to wiggle toes R.     < from: Xray Foot AP + Lateral + Oblique, Right (08.06.21 @ 09:04) >  EXAM:  XR FOOT COMP MIN 3 VIEWS RT                            PROCEDURE DATE:  08/06/2021          INTERPRETATION:  RIGHT foot    CLINICAL INFORMATION: RIGHT great toe abscess. TECHNIQUE: AP,lateral and oblique views.    FINDINGS:  Soft tissue swelling of first hallux with bandage artifacts. No subcutaneous air or radiopaque foreign body. No radiographic evidence of underlying fracture or osteomyelitis. Remaining osseous and joint structures intact.    IMPRESSION:    First hallux soft tissue swelling with bandage artifact. Underlying osseous structures intact.            MR Foot No Cont, Right (08.06.21 @ 16:58)     EXAM:  MR FOOT RT                            PROCEDURE DATE:  08/06/2021          INTERPRETATION:  EXAMINATION: MRI of the right forefoot without contrast    CLINICAL INFORMATION: Infected foot ulcer    TECHNIQUE: Multiplanar, multisequential MR imaging was performed.    FINDINGS: There is a cutaneous wound of the dorsal hallux at the level of the proximal phalanx with adjacent skin thickening subcutaneous infiltration, consistent with cellulitis. There are no areas of adjacent marrow signal alteration that are suspicious for osteomyelitis. No well-formed collections are demonstrated, however, evaluation is limited without contrast. There is no acute fracture. There is calcaneocuboid arthrosis with marginating subchondral cystic change and marrow edema on both sides of the joint. There is also mild fourth and fifth tarsometatarsal arthrosis with marginating edema. There are no other advanced arthritic changes. There is nonspecific dorsal subcutaneous soft tissue edema.    IMPRESSION: Cutaneous wound of the dorsal hallux at the level of the proximal phalanx with adjacent cellulitic change. No evidence of adjacent osteomyelitis.    MRI with contrast pending (8/7)    Culture Results:   Numerous Staphylococcus aureus (08.05.21 @ 22:11)          US Duplex: EXAM:  US DPLX LWR EXT VEINS LTD RT                            PROCEDURE DATE:  08/05/2021          INTERPRETATION:  CLINICAL INFORMATION: Right leg edema and swelling    COMPARISON: None available.    TECHNIQUE: Duplex sonography of the RIGHT LOWER extremity veins with color and spectral Doppler, with and without compression.    FINDINGS:    There is normal compressibility of the right common femoral, femoral and popliteal veins.  Doppler examination shows normal spontaneous and phasic flow.    No calf vein thrombosis is detected.    IMPRESSION:  No evidence of right lower extremity deep venous thrombosis.             Podiatry HPI Interval: Patient seen bedside resting comfortably. Patient speaks Maltese and needs . Patient states R great toe is still painful. No new events occurred overnight.  Patient denies N/V/F/C/SOB.     Podiatry HPI: Patient presents to the ED for R foot cellulitis. Patient states she went to the [place] where she had a blister on her R great toe. Patient states as the week went on the blister got worse and worse. Patient states doctor from [place] gave her cream to apply but has not helped. Patient states R great toe is painful to touch and she is unable to walk. Patient denies any trauma to area. Denies N/V/F/C/SOB. No other pedal complaints.         Patient is a 76y old  Female who presents with a chief complaint of     HPI:  Patient is a 76F with PMHx of CAD, HTN, HLD, hypothyroidism , anemia, PSH total r knee replacement  presenting with chief complaint of right leg swelling. Patient spent the [place] in [place], just [place] the [place] today. 7 days ago, she developed a pimple on the dorsal surface of her first big toe. She popped it and started draining fluids. Blister returned and surrounding area started getting red. 5 days ago, she noted that the redness extended to her calf, and started getting swollen. Denied any fever, chills. Has cough for 2 days, no sputum production, SOB.  Denies nausea, vomiting, diarrhea, abdominal pain, SOB, chest pain, PRUITT, palpitations, dizziness, headache, cough, wheezing.       Ed course : patient received Rocephin  1gr X1, and 1 liter bolus , was seen by podietry , recommended no MRI needed given low suspicion for osteomyelitis      (05 Aug 2021 18:10)      Medications acetaminophen   Tablet .. 650 milliGRAM(s) Oral every 6 hours PRN  ampicillin/sulbactam  IVPB      ampicillin/sulbactam  IVPB 3 Gram(s) IV Intermittent every 6 hours  ascorbic acid 500 milliGRAM(s) Oral daily  carvedilol 25 milliGRAM(s) Oral every 12 hours  ferrous    sulfate 325 milliGRAM(s) Oral three times a day  gabapentin 300 milliGRAM(s) Oral two times a day  heparin   Injectable 5000 Unit(s) SubCutaneous every 8 hours  levothyroxine 75 MICROGram(s) Oral daily  pantoprazole    Tablet 40 milliGRAM(s) Oral before breakfast  sertraline 50 milliGRAM(s) Oral daily  vancomycin  IVPB 1000 milliGRAM(s) IV Intermittent every 12 hours  zolpidem 5 milliGRAM(s) Oral at bedtime PRN  zolpidem 5 milliGRAM(s) Oral at bedtime PRN    FHFamily history of cardiomyopathy (Mother)    Family history of coronary artery disease (Father, Sibling)    Family history of lung cancer (Sibling)    Family history of lung cancer (Father)    Family history of cardiomyopathy    Family history of coronary artery disease (Mother)    ,   PMHHistory of hypertension    Hypercholesterolemia    Myocardial infarction syndrome    Osteoarthritis    Hypothyroid    CAD (coronary artery disease)    Depression    Hyperlipidemia    Anemia       PSHCholecystitis    History of angioplasty    History of shoulder surgery    H/O total knee replacement, right        Labs                          10.2   5.56  )-----------( 227      ( 07 Aug 2021 06:33 )             33.1      08-07    141  |  108  |  15  ----------------------------<  119<H>  4.6   |  31  |  0.78    Ca    8.6      07 Aug 2021 06:33  Phos  3.7     08-06  Mg     1.9     08-06    TPro  6.4  /  Alb  2.7<L>  /  TBili  0.1<L>  /  DBili  x   /  AST  12  /  ALT  34  /  AlkPhos  83  08-07     Vital Signs Last 24 Hrs  T(C): 36.6 (07 Aug 2021 06:10), Max: 36.6 (07 Aug 2021 06:10)  T(F): 97.9 (07 Aug 2021 06:10), Max: 97.9 (07 Aug 2021 06:10)  HR: 64 (07 Aug 2021 06:10) (60 - 64)  BP: 116/65 (07 Aug 2021 06:10) (116/65 - 138/66)  BP(mean): --  RR: 18 (07 Aug 2021 06:10) (18 - 18)  SpO2: 95% (07 Aug 2021 06:10) (95% - 97%)  Sedimentation Rate, Erythrocyte: 6 mm/Hr (08-06-21 @ 06:03)         C-Reactive Protein, Serum: 32 mg/L (08-06-21 @ 10:02)  WBC Count: 5.56 K/uL (08-07-21 @ 06:33)    ROS  REVIEW OF SYSTEMS:    CONSTITUTIONAL: No fever, weight loss, or fatigue  EYES: No eye pain, visual disturbances, or discharge  ENMT:  No difficulty hearing, tinnitus, vertigo; No sinus or throat pain  NECK: No pain or stiffness  BREASTS: No pain, masses, or nipple discharge  RESPIRATORY: No cough, wheezing, chills or hemoptysis; No shortness of breath  CARDIOVASCULAR: No chest pain, palpitations, dizziness, or leg swelling  GASTROINTESTINAL: No abdominal or epigastric pain. No nausea, vomiting, or hematemesis; No diarrhea or constipation. No melena or hematochezia.  GENITOURINARY: No dysuria, frequency, hematuria, or incontinence  NEUROLOGICAL: No headaches, memory loss, loss of strength, numbness, or tremors  SKIN: No itching, burning, rashes, or lesions   LYMPH NODES: No enlarged glands  ENDOCRINE: No heat or cold intolerance; No hair loss  MUSCULOSKELETAL: No joint pain or swelling; No muscle, back, or extremity pain  PSYCHIATRIC: No depression, anxiety, mood swings, or difficulty sleeping  HEME/LYMPH: No easy bruising, or bleeding gums  ALLERY AND IMMUNOLOGIC: No hives or eczema      PHYSICAL EXAM  GEN: OFELIA MOSES is a pleasant well-nourished, well developed 76y Female in no acute distress, alert awake, and oriented to person, place and time.   LE Focused:  RLE   Vasc: Pulses palpable 2/4 R. CFT <3seconds to distal tuft R. Skin temp warm to warm proximal to distal R.   Derm: Erythema noted to R great toe extending proximally to midfoot dorsally. Wound noted to dorsal R great toe measuring 2cm x 1cm x 0.3 cm. 1 cc purulent drainage noted to wound upon expression. Mild fluctuance noted to R great toe wound. +PTB.    Neuro: Protective sensation intact   MSK: Pain on palpation to R great toe. Able to wiggle toes R.     EXAM:  XR FOOT COMP MIN 3 VIEWS RT                            PROCEDURE DATE:  08/06/2021          INTERPRETATION:  RIGHT foot    CLINICAL INFORMATION: RIGHT great toe abscess. TECHNIQUE: AP,lateral and oblique views.    FINDINGS:  Soft tissue swelling of first hallux with bandage artifacts. No subcutaneous air or radiopaque foreign body. No radiographic evidence of underlying fracture or osteomyelitis. Remaining osseous and joint structures intact.    IMPRESSION:    First hallux soft tissue swelling with bandage artifact. Underlying osseous structures intact.            MR Foot No Cont, Right (08.06.21 @ 16:58)     EXAM:  MR FOOT RT                            PROCEDURE DATE:  08/06/2021          INTERPRETATION:  EXAMINATION: MRI of the right forefoot without contrast    CLINICAL INFORMATION: Infected foot ulcer    TECHNIQUE: Multiplanar, multisequential MR imaging was performed.    FINDINGS: There is a cutaneous wound of the dorsal hallux at the level of the proximal phalanx with adjacent skin thickening subcutaneous infiltration, consistent with cellulitis. There are no areas of adjacent marrow signal alteration that are suspicious for osteomyelitis. No well-formed collections are demonstrated, however, evaluation is limited without contrast. There is no acute fracture. There is calcaneocuboid arthrosis with marginating subchondral cystic change and marrow edema on both sides of the joint. There is also mild fourth and fifth tarsometatarsal arthrosis with marginating edema. There are no other advanced arthritic changes. There is nonspecific dorsal subcutaneous soft tissue edema.    IMPRESSION: Cutaneous wound of the dorsal hallux at the level of the proximal phalanx with adjacent cellulitic change. No evidence of adjacent osteomyelitis.    MRI with contrast pending (8/7)    Culture Results: Numerous Staphylococcus aureus (08.05.21 @ 22:11)          US Duplex: EXAM:  US DPLX LWR EXT VEINS LTD RT                            PROCEDURE DATE:  08/05/2021          INTERPRETATION:  CLINICAL INFORMATION: Right leg edema and swelling    COMPARISON: None available.    TECHNIQUE: Duplex sonography of the RIGHT LOWER extremity veins with color and spectral Doppler, with and without compression.    FINDINGS:    There is normal compressibility of the right common femoral, femoral and popliteal veins.  Doppler examination shows normal spontaneous and phasic flow.    No calf vein thrombosis is detected.    IMPRESSION:  No evidence of right lower extremity deep venous thrombosis.

## 2021-08-08 LAB
A1C WITH ESTIMATED AVERAGE GLUCOSE RESULT: 6.4 % — HIGH (ref 4–5.6)
ANION GAP SERPL CALC-SCNC: 3 MMOL/L — LOW (ref 5–17)
BUN SERPL-MCNC: 14 MG/DL — SIGNIFICANT CHANGE UP (ref 7–18)
CALCIUM SERPL-MCNC: 8.8 MG/DL — SIGNIFICANT CHANGE UP (ref 8.4–10.5)
CHLORIDE SERPL-SCNC: 105 MMOL/L — SIGNIFICANT CHANGE UP (ref 96–108)
CO2 SERPL-SCNC: 31 MMOL/L — SIGNIFICANT CHANGE UP (ref 22–31)
CREAT SERPL-MCNC: 0.81 MG/DL — SIGNIFICANT CHANGE UP (ref 0.5–1.3)
ESTIMATED AVERAGE GLUCOSE: 137 MG/DL — HIGH (ref 68–114)
GLUCOSE SERPL-MCNC: 117 MG/DL — HIGH (ref 70–99)
HCT VFR BLD CALC: 33.8 % — LOW (ref 34.5–45)
HGB BLD-MCNC: 10.4 G/DL — LOW (ref 11.5–15.5)
MCHC RBC-ENTMCNC: 29.8 PG — SIGNIFICANT CHANGE UP (ref 27–34)
MCHC RBC-ENTMCNC: 30.8 GM/DL — LOW (ref 32–36)
MCV RBC AUTO: 96.8 FL — SIGNIFICANT CHANGE UP (ref 80–100)
NRBC # BLD: 0 /100 WBCS — SIGNIFICANT CHANGE UP (ref 0–0)
PLATELET # BLD AUTO: 230 K/UL — SIGNIFICANT CHANGE UP (ref 150–400)
POTASSIUM SERPL-MCNC: 4.4 MMOL/L — SIGNIFICANT CHANGE UP (ref 3.5–5.3)
POTASSIUM SERPL-SCNC: 4.4 MMOL/L — SIGNIFICANT CHANGE UP (ref 3.5–5.3)
RBC # BLD: 3.49 M/UL — LOW (ref 3.8–5.2)
RBC # FLD: 13.9 % — SIGNIFICANT CHANGE UP (ref 10.3–14.5)
SODIUM SERPL-SCNC: 139 MMOL/L — SIGNIFICANT CHANGE UP (ref 135–145)
VANCOMYCIN TROUGH SERPL-MCNC: 14.5 UG/ML — SIGNIFICANT CHANGE UP (ref 10–20)
WBC # BLD: 6.8 K/UL — SIGNIFICANT CHANGE UP (ref 3.8–10.5)
WBC # FLD AUTO: 6.8 K/UL — SIGNIFICANT CHANGE UP (ref 3.8–10.5)

## 2021-08-08 PROCEDURE — 71045 X-RAY EXAM CHEST 1 VIEW: CPT | Mod: 26

## 2021-08-08 RX ORDER — LIDOCAINE HCL 20 MG/ML
VIAL (ML) INJECTION
Refills: 0 | Status: DISCONTINUED | OUTPATIENT
Start: 2021-08-08 | End: 2021-08-08

## 2021-08-08 RX ORDER — LIDOCAINE HCL 20 MG/ML
10 VIAL (ML) INJECTION ONCE
Refills: 0 | Status: DISCONTINUED | OUTPATIENT
Start: 2021-08-08 | End: 2021-08-09

## 2021-08-08 RX ORDER — LIDOCAINE HCL 20 MG/ML
VIAL (ML) INJECTION
Refills: 0 | Status: DISCONTINUED | OUTPATIENT
Start: 2021-08-08 | End: 2021-08-09

## 2021-08-08 RX ADMIN — SERTRALINE 50 MILLIGRAM(S): 25 TABLET, FILM COATED ORAL at 11:55

## 2021-08-08 RX ADMIN — ZOLPIDEM TARTRATE 5 MILLIGRAM(S): 10 TABLET ORAL at 23:17

## 2021-08-08 RX ADMIN — Medication 75 MICROGRAM(S): at 05:28

## 2021-08-08 RX ADMIN — Medication 650 MILLIGRAM(S): at 13:07

## 2021-08-08 RX ADMIN — Medication 650 MILLIGRAM(S): at 22:34

## 2021-08-08 RX ADMIN — Medication 650 MILLIGRAM(S): at 22:04

## 2021-08-08 RX ADMIN — Medication 325 MILLIGRAM(S): at 22:00

## 2021-08-08 RX ADMIN — Medication 250 MILLIGRAM(S): at 21:44

## 2021-08-08 RX ADMIN — Medication 250 MILLIGRAM(S): at 11:55

## 2021-08-08 RX ADMIN — Medication 325 MILLIGRAM(S): at 13:07

## 2021-08-08 RX ADMIN — CARVEDILOL PHOSPHATE 25 MILLIGRAM(S): 80 CAPSULE, EXTENDED RELEASE ORAL at 17:49

## 2021-08-08 RX ADMIN — Medication 325 MILLIGRAM(S): at 05:28

## 2021-08-08 RX ADMIN — HEPARIN SODIUM 5000 UNIT(S): 5000 INJECTION INTRAVENOUS; SUBCUTANEOUS at 13:07

## 2021-08-08 RX ADMIN — HEPARIN SODIUM 5000 UNIT(S): 5000 INJECTION INTRAVENOUS; SUBCUTANEOUS at 22:00

## 2021-08-08 RX ADMIN — Medication 10 MILLILITER(S): at 11:54

## 2021-08-08 RX ADMIN — Medication 500 MILLIGRAM(S): at 11:55

## 2021-08-08 RX ADMIN — GABAPENTIN 300 MILLIGRAM(S): 400 CAPSULE ORAL at 05:28

## 2021-08-08 RX ADMIN — Medication 10 MILLILITER(S): at 05:28

## 2021-08-08 RX ADMIN — Medication 650 MILLIGRAM(S): at 14:00

## 2021-08-08 RX ADMIN — HEPARIN SODIUM 5000 UNIT(S): 5000 INJECTION INTRAVENOUS; SUBCUTANEOUS at 05:28

## 2021-08-08 RX ADMIN — Medication 10 MILLILITER(S): at 17:49

## 2021-08-08 RX ADMIN — CARVEDILOL PHOSPHATE 25 MILLIGRAM(S): 80 CAPSULE, EXTENDED RELEASE ORAL at 05:28

## 2021-08-08 RX ADMIN — PANTOPRAZOLE SODIUM 40 MILLIGRAM(S): 20 TABLET, DELAYED RELEASE ORAL at 05:29

## 2021-08-08 RX ADMIN — GABAPENTIN 300 MILLIGRAM(S): 400 CAPSULE ORAL at 17:49

## 2021-08-08 RX ADMIN — Medication 10 MILLILITER(S): at 23:16

## 2021-08-08 NOTE — PROGRESS NOTE ADULT - ASSESSMENT
Patient is a 76y old  Female from home with PMHx of CAD, HTN, HLD, hypothyroidism, who came to the ER for evaluation of RLE swelling and tenderness. Pt endorses that a week ago she was in [place] and developed a sore on the dorsal surface of her first big toe. She popped it and started draining fluid. Few days later the affected area developed redness extended to her calf, associated with swelling and tenderness. Also endorses cough for 2 days, no sputum production. On admission, she has no fever or Leukocytosis. She has started on Unasyn and the ID consult requested to assist with further evaluation and antibiotic management.     # Right foot wound with cellulitis- wound culture grew MRSA. The MRI of Right foot shows No Osteomyelitis. -8/5/21      would recommend:    1. Continue Vancomycin to cover MRSA  2. Monitor and Keep Vancomycin level between 15 to 20  3. May change to oral Abx on discharge   4. Wound care   5. Contact Isolation     d/w /Bassem     Attending  Attestation:    Spent more than 35 minutes on total encounter, more than 50 % of the visit was spent counseling and/or coordinating care by the Attending physician. Patient is a 76y old  Female from home with PMHx of CAD, HTN, HLD, hypothyroidism, who came to the ER for evaluation of RLE swelling and tenderness. Pt endorses that a week ago she was in [place] and developed a sore on the dorsal surface of her first big toe. She popped it and started draining fluid. Few days later the affected area developed redness extended to her calf, associated with swelling and tenderness. Also endorses cough for 2 days, no sputum production. On admission, she has no fever or Leukocytosis. She has started on Unasyn and the ID consult requested to assist with further evaluation and antibiotic management.     # Right foot wound with cellulitis- wound culture grew MRSA. The MRI of Right foot shows No Osteomyelitis. -8/5/21      would recommend:    1. Continue Vancomycin to cover MRSA  2. Monitor and Keep Vancomycin level between 15 to 20  3. May change to oral Bactrim DS 1 tab twice daily on discharge to continue until 8/20/21  4. Wound care   5. Contact Isolation     d/w Dr. Luevano     Attending  Attestation:    Spent more than 35 minutes on total encounter, more than 50 % of the visit was spent counseling and/or coordinating care by the Attending physician.

## 2021-08-08 NOTE — PROGRESS NOTE ADULT - SUBJECTIVE AND OBJECTIVE BOX
Patient is a 76y old  Female who presents with a chief complaint of skin infection (07 Aug 2021 18:37)    pt seen in ed [ x ], reg med floor [   ], bed [x  ], chair at bedside [   ], a+o x3 [ x ], lethargic [  ],    nad [ x ]        Allergies    No Known Allergies        Vitals    T(F): 97.9 (08-08-21 @ 05:25), Max: 98 (08-07-21 @ 13:56)  HR: 71 (08-08-21 @ 05:25) (70 - 78)  BP: 142/68 (08-08-21 @ 05:25) (142/68 - 165/68)  RR: 18 (08-08-21 @ 05:25) (18 - 18)  SpO2: 95% (08-08-21 @ 05:25) (95% - 97%)  Wt(kg): --  CAPILLARY BLOOD GLUCOSE          Labs                          10.4   6.80  )-----------( 230      ( 08 Aug 2021 05:54 )             33.8       08-08    139  |  105  |  14  ----------------------------<  117<H>  4.4   |  31  |  0.81    Ca    8.8      08 Aug 2021 05:54    TPro  6.4  /  Alb  2.7<L>  /  TBili  0.1<L>  /  DBili  x   /  AST  12  /  ALT  34  /  AlkPhos  83  08-07            Clean Catch Clean Catch (Midstream)  08-06 @ 03:31   <10,000 CFU/mL Normal Urogenital Kiersten  --  --      .Surgical Swab R great toe abscess  08-05 @ 22:11   Numerous Methicillin Resistant Staphylococcus aureus  --  Methicillin resistant Staphylococcus aureus      .Blood Blood-Peripheral  08-05 @ 18:31   No growth to date.  --  --          Radiology Results      Meds    MEDICATIONS  (STANDING):  ascorbic acid 500 milliGRAM(s) Oral daily  carvedilol 25 milliGRAM(s) Oral every 12 hours  ferrous    sulfate 325 milliGRAM(s) Oral three times a day  gabapentin 300 milliGRAM(s) Oral two times a day  guaifenesin/dextromethorphan Oral Liquid 10 milliLiter(s) Oral every 6 hours  heparin   Injectable 5000 Unit(s) SubCutaneous every 8 hours  levothyroxine 75 MICROGram(s) Oral daily  pantoprazole    Tablet 40 milliGRAM(s) Oral before breakfast  sertraline 50 milliGRAM(s) Oral daily  vancomycin  IVPB 1000 milliGRAM(s) IV Intermittent every 12 hours      MEDICATIONS  (PRN):  acetaminophen   Tablet .. 650 milliGRAM(s) Oral every 6 hours PRN Mild Pain (1 - 3), Moderate Pain (4 - 6)  zolpidem 5 milliGRAM(s) Oral at bedtime PRN Insomnia  zolpidem 5 milliGRAM(s) Oral at bedtime PRN Insomnia      Physical Exam    Neuro :  no focal deficits  Respiratory: CTA B/L  CV: RRR, S1S2, no murmurs,   Abdominal: Soft, NT, ND +BS,  Extremities: No edema, + peripheral pulses, right great toe dressing cdi      ASSESSMENT      right foot cellulitis with abcess,   osteo r/o,   h/o CAD,   HTN,   HLD,   hypothyroidism,  Osteoarthritis  Depression  Anemia  s/p angioplasty  s/p shoulder surgery  s/p total knee replacement, right        PLAN      wound cx with staph  mri right foot with Cutaneous wound of the dorsal hallux at the level of the proximal phalanx with adjacent cellulitic change. No evidence of adjacent osteomyelitis noted above.   id f/u   Continue Unasyn and Vancomycin until work up is done  podiatry f/u .  Dressed wound with DSD, ACE  Patient to keep dressing clean, dry and intact   Patient to stay heel WB R foot   cont current meds           Patient is a 76y old  Female who presents with a chief complaint of skin infection (07 Aug 2021 18:37)    pt seen in ed [ x ], reg med floor [   ], bed [x  ], chair at bedside [   ], a+o x3 [ x ], lethargic [  ],    nad [ x ]        Allergies    No Known Allergies        Vitals    T(F): 97.9 (08-08-21 @ 05:25), Max: 98 (08-07-21 @ 13:56)  HR: 71 (08-08-21 @ 05:25) (70 - 78)  BP: 142/68 (08-08-21 @ 05:25) (142/68 - 165/68)  RR: 18 (08-08-21 @ 05:25) (18 - 18)  SpO2: 95% (08-08-21 @ 05:25) (95% - 97%)  Wt(kg): --  CAPILLARY BLOOD GLUCOSE          Labs                          10.4   6.80  )-----------( 230      ( 08 Aug 2021 05:54 )             33.8       08-08    139  |  105  |  14  ----------------------------<  117<H>  4.4   |  31  |  0.81    Ca    8.8      08 Aug 2021 05:54    TPro  6.4  /  Alb  2.7<L>  /  TBili  0.1<L>  /  DBili  x   /  AST  12  /  ALT  34  /  AlkPhos  83  08-07            Clean Catch Clean Catch (Midstream)  08-06 @ 03:31   <10,000 CFU/mL Normal Urogenital Kiersten  --  --      .Surgical Swab R great toe abscess  08-05 @ 22:11   Numerous Methicillin Resistant Staphylococcus aureus  --  Methicillin resistant Staphylococcus aureus      .Blood Blood-Peripheral  08-05 @ 18:31   No growth to date.  --  --          Radiology Results      Meds    MEDICATIONS  (STANDING):  ascorbic acid 500 milliGRAM(s) Oral daily  carvedilol 25 milliGRAM(s) Oral every 12 hours  ferrous    sulfate 325 milliGRAM(s) Oral three times a day  gabapentin 300 milliGRAM(s) Oral two times a day  guaifenesin/dextromethorphan Oral Liquid 10 milliLiter(s) Oral every 6 hours  heparin   Injectable 5000 Unit(s) SubCutaneous every 8 hours  levothyroxine 75 MICROGram(s) Oral daily  pantoprazole    Tablet 40 milliGRAM(s) Oral before breakfast  sertraline 50 milliGRAM(s) Oral daily  vancomycin  IVPB 1000 milliGRAM(s) IV Intermittent every 12 hours      MEDICATIONS  (PRN):  acetaminophen   Tablet .. 650 milliGRAM(s) Oral every 6 hours PRN Mild Pain (1 - 3), Moderate Pain (4 - 6)  zolpidem 5 milliGRAM(s) Oral at bedtime PRN Insomnia  zolpidem 5 milliGRAM(s) Oral at bedtime PRN Insomnia      Physical Exam    Neuro :  no focal deficits  Respiratory: CTA B/L  CV: RRR, S1S2, no murmurs,   Abdominal: Soft, NT, ND +BS,  Extremities: No edema, + peripheral pulses, right great toe dressing cdi      ASSESSMENT      right foot cellulitis with abcess,   osteo r/o,   h/o CAD,   HTN,   HLD,   hypothyroidism,  Osteoarthritis  Depression  Anemia  s/p angioplasty  s/p shoulder surgery  s/p total knee replacement, right        PLAN        mri right foot with Cutaneous wound of the dorsal hallux at the level of the proximal phalanx with adjacent cellulitic change. No evidence of adjacent osteomyelitis noted   wound cx with mrsa  id f/u   Unasyn discontinued  continue Vancomycin to cover MRSA  podiatry f/u .  Dressed wound with DSD, ACE  Patient to keep dressing clean, dry and intact   Patient to stay heel WB R foot   cont current meds

## 2021-08-08 NOTE — PROGRESS NOTE ADULT - ASSESSMENT
A: R great toe abscess     P:  Patient evaluated, chart reviewed  Xrays reviewed  Labs reviewed   Monitor labs  Recommend MRI with Contrast per medicine   Dressed wound with Betadine, DSD, ACE  Patient to keep dressing clean, dry and intact   Patient to stay heel WB R foot   Possible bedside I&D tomorrow morning  Continue Antibiotics per medicine team  Discussed with Attending Dr. Cruz

## 2021-08-08 NOTE — PROGRESS NOTE ADULT - SUBJECTIVE AND OBJECTIVE BOX
Podiatry HPI Interval: Patient seen bedside resting comfortably. Patient speaks Lithuanian and needs . Patient states R great toe has decreased in pain. Patient states she is keeping her dressing clean, dry and intact. No new events occurred overnight. Patient denies N/V/F/C/SOB.     Podiatry HPI: Patient presents to the ED for R foot cellulitis. Patient states she went to the [place] where she had a blister on her R great toe. Patient states as the week went on the blister got worse and worse. Patient states doctor from [place] gave her cream to apply but has not helped. Patient states R great toe is painful to touch and she is unable to walk. Patient denies any trauma to area. Denies N/V/F/C/SOB. No other pedal complaints.       Patient is a 76y old  Female who presents with a chief complaint of     HPI:  Patient is a 76F with PMHx of CAD, HTN, HLD, hypothyroidism , anemia, PSH total r knee replacement  presenting with chief complaint of right leg swelling. Patient [place] the [place] in [place], just returning [place] the [place] today. 7 days ago, she developed a pimple on the dorsal surface of her first big toe. She popped it and started draining fluids. Blister returned and surrounding area started getting red. 5 days ago, she noted that the redness extended to her calf, and started getting swollen. Denied any fever, chills. Has cough for 2 days, no sputum production, SOB.  Denies nausea, vomiting, diarrhea, abdominal pain, SOB, chest pain, PRUITT, palpitations, dizziness, headache, cough, wheezing.       Ed course : patient received Rocephin  1gr X1, and 1 liter bolus , was seen by podietry , recommended no MRI needed given low suspicion for osteomyelitis      (05 Aug 2021 18:10)      Medications acetaminophen   Tablet .. 650 milliGRAM(s) Oral every 6 hours PRN  ampicillin/sulbactam  IVPB      ampicillin/sulbactam  IVPB 3 Gram(s) IV Intermittent every 6 hours  ascorbic acid 500 milliGRAM(s) Oral daily  carvedilol 25 milliGRAM(s) Oral every 12 hours  ferrous    sulfate 325 milliGRAM(s) Oral three times a day  gabapentin 300 milliGRAM(s) Oral two times a day  heparin   Injectable 5000 Unit(s) SubCutaneous every 8 hours  levothyroxine 75 MICROGram(s) Oral daily  pantoprazole    Tablet 40 milliGRAM(s) Oral before breakfast  sertraline 50 milliGRAM(s) Oral daily  vancomycin  IVPB 1000 milliGRAM(s) IV Intermittent every 12 hours  zolpidem 5 milliGRAM(s) Oral at bedtime PRN  zolpidem 5 milliGRAM(s) Oral at bedtime PRN    FHFamily history of cardiomyopathy (Mother)    Family history of coronary artery disease (Father, Sibling)    Family history of lung cancer (Sibling)    Family history of lung cancer (Father)    Family history of cardiomyopathy    Family history of coronary artery disease (Mother)    ,   PMHHistory of hypertension    Hypercholesterolemia    Myocardial infarction syndrome    Osteoarthritis    Hypothyroid    CAD (coronary artery disease)    Depression    Hyperlipidemia    Anemia       PSHCholecystitis    History of angioplasty    History of shoulder surgery    H/O total knee replacement, right        Labs                          10.2   5.56  )-----------( 227      ( 07 Aug 2021 06:33 )             33.1      08-07    141  |  108  |  15  ----------------------------<  119<H>  4.6   |  31  |  0.78    Ca    8.6      07 Aug 2021 06:33  Phos  3.7     08-06  Mg     1.9     08-06    TPro  6.4  /  Alb  2.7<L>  /  TBili  0.1<L>  /  DBili  x   /  AST  12  /  ALT  34  /  AlkPhos  83  08-07     Vital Signs Last 24 Hrs  T(C): 36.6 (07 Aug 2021 06:10), Max: 36.6 (07 Aug 2021 06:10)  T(F): 97.9 (07 Aug 2021 06:10), Max: 97.9 (07 Aug 2021 06:10)  HR: 64 (07 Aug 2021 06:10) (60 - 64)  BP: 116/65 (07 Aug 2021 06:10) (116/65 - 138/66)  BP(mean): --  RR: 18 (07 Aug 2021 06:10) (18 - 18)  SpO2: 95% (07 Aug 2021 06:10) (95% - 97%)  Sedimentation Rate, Erythrocyte: 6 mm/Hr (08-06-21 @ 06:03)         C-Reactive Protein, Serum: 32 mg/L (08-06-21 @ 10:02)  WBC Count: 5.56 K/uL (08-07-21 @ 06:33)    ROS  REVIEW OF SYSTEMS:    CONSTITUTIONAL: No fever, weight loss, or fatigue  EYES: No eye pain, visual disturbances, or discharge  ENMT:  No difficulty hearing, tinnitus, vertigo; No sinus or throat pain  NECK: No pain or stiffness  BREASTS: No pain, masses, or nipple discharge  RESPIRATORY: No cough, wheezing, chills or hemoptysis; No shortness of breath  CARDIOVASCULAR: No chest pain, palpitations, dizziness, or leg swelling  GASTROINTESTINAL: No abdominal or epigastric pain. No nausea, vomiting, or hematemesis; No diarrhea or constipation. No melena or hematochezia.  GENITOURINARY: No dysuria, frequency, hematuria, or incontinence  NEUROLOGICAL: No headaches, memory loss, loss of strength, numbness, or tremors  SKIN: No itching, burning, rashes, or lesions   LYMPH NODES: No enlarged glands  ENDOCRINE: No heat or cold intolerance; No hair loss  MUSCULOSKELETAL: No joint pain or swelling; No muscle, back, or extremity pain  PSYCHIATRIC: No depression, anxiety, mood swings, or difficulty sleeping  HEME/LYMPH: No easy bruising, or bleeding gums  ALLERY AND IMMUNOLOGIC: No hives or eczema      PHYSICAL EXAM  GEN: OFELIA MOSES is a pleasant well-nourished, well developed 76y Female in no acute distress, alert awake, and oriented to person, place and time.   LE Focused:  RLE   Vasc: Pulses palpable 2/4 R. CFT <3seconds to distal tuft R. Skin temp warm to warm proximal to distal R.   Derm: Erythema noted to R great toe extending proximally to midfoot dorsally. Wound noted to dorsal R great toe measuring 2cm x 1cm x 0.3 cm. 1 cc purulent drainage noted to wound upon expression. Mild fluctuance noted to R great toe wound. +PTB.    Neuro: Protective sensation intact   MSK: Pain on palpation to R great toe. Able to wiggle toes R.     EXAM:  XR FOOT COMP MIN 3 VIEWS RT                            PROCEDURE DATE:  08/06/2021          INTERPRETATION:  RIGHT foot    CLINICAL INFORMATION: RIGHT great toe abscess. TECHNIQUE: AP,lateral and oblique views.    FINDINGS:  Soft tissue swelling of first hallux with bandage artifacts. No subcutaneous air or radiopaque foreign body. No radiographic evidence of underlying fracture or osteomyelitis. Remaining osseous and joint structures intact.    IMPRESSION:    First hallux soft tissue swelling with bandage artifact. Underlying osseous structures intact.            MR Foot No Cont, Right (08.06.21 @ 16:58)     EXAM:  MR FOOT RT                            PROCEDURE DATE:  08/06/2021          INTERPRETATION:  EXAMINATION: MRI of the right forefoot without contrast    CLINICAL INFORMATION: Infected foot ulcer    TECHNIQUE: Multiplanar, multisequential MR imaging was performed.    FINDINGS: There is a cutaneous wound of the dorsal hallux at the level of the proximal phalanx with adjacent skin thickening subcutaneous infiltration, consistent with cellulitis. There are no areas of adjacent marrow signal alteration that are suspicious for osteomyelitis. No well-formed collections are demonstrated, however, evaluation is limited without contrast. There is no acute fracture. There is calcaneocuboid arthrosis with marginating subchondral cystic change and marrow edema on both sides of the joint. There is also mild fourth and fifth tarsometatarsal arthrosis with marginating edema. There are no other advanced arthritic changes. There is nonspecific dorsal subcutaneous soft tissue edema.    IMPRESSION: Cutaneous wound of the dorsal hallux at the level of the proximal phalanx with adjacent cellulitic change. No evidence of adjacent osteomyelitis.    MRI with contrast pending (8/7)    Culture Results: Numerous Staphylococcus aureus (08.05.21 @ 22:11)          US Duplex: EXAM:  US DPLX LWR EXT VEINS LTD RT                            PROCEDURE DATE:  08/05/2021          INTERPRETATION:  CLINICAL INFORMATION: Right leg edema and swelling    COMPARISON: None available.    TECHNIQUE: Duplex sonography of the RIGHT LOWER extremity veins with color and spectral Doppler, with and without compression.    FINDINGS:    There is normal compressibility of the right common femoral, femoral and popliteal veins.  Doppler examination shows normal spontaneous and phasic flow.    No calf vein thrombosis is detected.    IMPRESSION:  No evidence of right lower extremity deep venous thrombosis.             Podiatry HPI Interval: Patient seen bedside resting comfortably. Patient speaks Yakut and needs . Patient states R great toe has decreased in pain. Patient states she is keeping her dressing clean, dry and intact. No new events occurred overnight. Patient denies N/V/F/C/SOB.     Podiatry HPI: Patient presents to the ED for R foot cellulitis. Patient states she went to the [place] where she had a blister on her R great toe. Patient states as the week went on the blister got worse and worse. Patient states doctor from [place] gave her cream to apply but has not helped. Patient states R great toe is painful to touch and she is unable to walk. Patient denies any trauma to area. Denies N/V/F/C/SOB. No other pedal complaints.       Patient is a 76y old  Female who presents with a chief complaint of     HPI:  Patient is a 76F with PMHx of CAD, HTN, HLD, hypothyroidism , anemia, PSH total r knee replacement  presenting with chief complaint of right leg swelling. Patient spent the past few months in the [place], just returning [place] the [place] today. 7 days ago, she developed a pimple on the dorsal surface of her first big toe. She popped it and started draining fluids. Blister returned and surrounding area started getting red. 5 days ago, she noted that the redness extended to her calf, and started getting swollen. Denied any fever, chills. Has cough for 2 days, no sputum production, SOB.  Denies nausea, vomiting, diarrhea, abdominal pain, SOB, chest pain, PRUITT, palpitations, dizziness, headache, cough, wheezing.       Medications acetaminophen   Tablet .. 650 milliGRAM(s) Oral every 6 hours PRN  ascorbic acid 500 milliGRAM(s) Oral daily  carvedilol 25 milliGRAM(s) Oral every 12 hours  ferrous    sulfate 325 milliGRAM(s) Oral three times a day  gabapentin 300 milliGRAM(s) Oral two times a day  guaifenesin/dextromethorphan Oral Liquid 10 milliLiter(s) Oral every 6 hours  heparin   Injectable 5000 Unit(s) SubCutaneous every 8 hours  levothyroxine 75 MICROGram(s) Oral daily  pantoprazole    Tablet 40 milliGRAM(s) Oral before breakfast  sertraline 50 milliGRAM(s) Oral daily  vancomycin  IVPB 1000 milliGRAM(s) IV Intermittent every 12 hours  zolpidem 5 milliGRAM(s) Oral at bedtime PRN  zolpidem 5 milliGRAM(s) Oral at bedtime PRN    FHFamily history of cardiomyopathy (Mother)    Family history of coronary artery disease (Father, Sibling)    Family history of lung cancer (Sibling)    Family history of lung cancer (Father)    Family history of cardiomyopathy    Family history of coronary artery disease (Mother)    ,   PMHHistory of hypertension    Hypercholesterolemia    Myocardial infarction syndrome    Osteoarthritis    Hypothyroid    CAD (coronary artery disease)    Depression    Hyperlipidemia    Anemia       PSHCholecystitis    History of angioplasty    History of shoulder surgery    H/O total knee replacement, right        Labs                          10.4   6.80  )-----------( 230      ( 08 Aug 2021 05:54 )             33.8      08-08    139  |  105  |  14  ----------------------------<  117<H>  4.4   |  31  |  0.81    Ca    8.8      08 Aug 2021 05:54    TPro  6.4  /  Alb  2.7<L>  /  TBili  0.1<L>  /  DBili  x   /  AST  12  /  ALT  34  /  AlkPhos  83  08-07     Vital Signs Last 24 Hrs  T(C): 36.7 (08 Aug 2021 14:23), Max: 36.7 (08 Aug 2021 14:23)  T(F): 98 (08 Aug 2021 14:23), Max: 98 (08 Aug 2021 14:23)  HR: 77 (08 Aug 2021 14:23) (70 - 78)  BP: 135/65 (08 Aug 2021 14:23) (135/65 - 165/68)  BP(mean): --  RR: 18 (08 Aug 2021 14:23) (18 - 18)  SpO2: 95% (08 Aug 2021 14:23) (95% - 96%)  Sedimentation Rate, Erythrocyte: 6 mm/Hr (08-06-21 @ 06:03)         C-Reactive Protein, Serum: 32 mg/L (08-06-21 @ 10:02)   WBC Count: 6.80 K/uL (08-08-21 @ 05:54)      PHYSICAL EXAM  GEN: OFELIA MOSES is a pleasant well-nourished, well developed 76y Female in no acute distress, alert awake, and oriented to person, place and time.   LE Focused:  RLE   Vasc: Pulses palpable 2/4 R. CFT <3seconds to distal tuft R. Skin temp warm to warm proximal to distal R.   Derm: Erythema noted to R great toe extending proximally to midfoot dorsally. Wound noted to dorsal R great toe measuring 2cm x 1cm x 0.3 cm. 1 cc purulent drainage noted to wound upon expression. Mild fluctuance noted to R great toe wound. +PTB.    Neuro: Protective sensation intact   MSK: Pain on palpation to R great toe. Able to wiggle toes R.     EXAM:  XR FOOT COMP MIN 3 VIEWS RT                            PROCEDURE DATE:  08/06/2021          INTERPRETATION:  RIGHT foot    CLINICAL INFORMATION: RIGHT great toe abscess. TECHNIQUE: AP,lateral and oblique views.    FINDINGS:  Soft tissue swelling of first hallux with bandage artifacts. No subcutaneous air or radiopaque foreign body. No radiographic evidence of underlying fracture or osteomyelitis. Remaining osseous and joint structures intact.    IMPRESSION:    First hallux soft tissue swelling with bandage artifact. Underlying osseous structures intact.            MR Foot No Cont, Right (08.06.21 @ 16:58)     EXAM:  MR FOOT RT                            PROCEDURE DATE:  08/06/2021          INTERPRETATION:  EXAMINATION: MRI of the right forefoot without contrast    CLINICAL INFORMATION: Infected foot ulcer    TECHNIQUE: Multiplanar, multisequential MR imaging was performed.    FINDINGS: There is a cutaneous wound of the dorsal hallux at the level of the proximal phalanx with adjacent skin thickening subcutaneous infiltration, consistent with cellulitis. There are no areas of adjacent marrow signal alteration that are suspicious for osteomyelitis. No well-formed collections are demonstrated, however, evaluation is limited without contrast. There is no acute fracture. There is calcaneocuboid arthrosis with marginating subchondral cystic change and marrow edema on both sides of the joint. There is also mild fourth and fifth tarsometatarsal arthrosis with marginating edema. There are no other advanced arthritic changes. There is nonspecific dorsal subcutaneous soft tissue edema.    IMPRESSION: Cutaneous wound of the dorsal hallux at the level of the proximal phalanx with adjacent cellulitic change. No evidence of adjacent osteomyelitis.    MRI with contrast pending (8/7)    Culture Results: Numerous Staphylococcus aureus (08.05.21 @ 22:11)    US Duplex: EXAM:  US DPLX LWR EXT VEINS LTD RT                            PROCEDURE DATE:  08/05/2021          INTERPRETATION:  CLINICAL INFORMATION: Right leg edema and swelling    COMPARISON: None available.    TECHNIQUE: Duplex sonography of the RIGHT LOWER extremity veins with color and spectral Doppler, with and without compression.    FINDINGS:    There is normal compressibility of the right common femoral, femoral and popliteal veins.  Doppler examination shows normal spontaneous and phasic flow.    No calf vein thrombosis is detected.    IMPRESSION:  No evidence of right lower extremity deep venous thrombosis.

## 2021-08-08 NOTE — PROGRESS NOTE ADULT - SUBJECTIVE AND OBJECTIVE BOX
Patient is seen and examined at the bed side, is afebrile. The Blood cultures have no growth to date and wound culture grew MRSA. The MRI of Right foot shows No Osteomyelitis.       REVIEW OF SYSTEMS: All other review systems are negative      ALLERGIES: No Known Allergies      Vital Signs Last 24 Hrs  T(C): 36.7 (08 Aug 2021 14:23), Max: 36.7 (08 Aug 2021 14:23)  T(F): 98 (08 Aug 2021 14:23), Max: 98 (08 Aug 2021 14:23)  HR: 72 (08 Aug 2021 17:45) (70 - 77)  BP: 170/92 (08 Aug 2021 17:45) (135/65 - 170/92)  BP(mean): --  RR: 18 (08 Aug 2021 14:23) (18 - 18)  SpO2: 95% (08 Aug 2021 14:23) (95% - 96%)    PHYSICAL EXAM:  GENERAL: Not in distress   CHEST/LUNG: Not using accessory muscles   HEART: s1 and s2 present  ABDOMEN:  Nontender and  Nondistended  EXTREMITIES: Right foot bandage in placed   CNS: Awake and Alert      LABS:                        10.4   6.80  )-----------( 230      ( 08 Aug 2021 05:54 )             33.8                           10.2   5.56  )-----------( 227      ( 07 Aug 2021 06:33 )             33.1       08-08    139  |  105  |  14  ----------------------------<  117<H>  4.4   |  31  |  0.81    Ca    8.8      08 Aug 2021 05:54    TPro  6.4  /  Alb  2.7<L>  /  TBili  0.1<L>  /  DBili  x   /  AST  12  /  ALT  34  /  AlkPhos  83  -      08-    141  |  108  |  15  ----------------------------<  119<H>  4.6   |  31  |  0.78    Ca    8.6      07 Aug 2021 06:33  Phos  3.7     08-  Mg     1.9     08-    TPro  6.4  /  Alb  2.7<L>  /  TBili  0.1<L>  /  DBili  x   /  AST  12  /  ALT  34  /  AlkPhos  83  08-07      PT/INR - ( 05 Aug 2021 14:05 )   PT: 12.4 sec;   INR: 1.04 ratio      PTT - ( 05 Aug 2021 14:05 )  PTT:31.5 sec        Urinalysis Basic - ( 05 Aug 2021 21:22 )  Color: Yellow / Appearance: Clear / S.010 / pH: x  Gluc: x / Ketone: Negative  / Bili: Negative / Urobili: Negative   Blood: x / Protein: Negative / Nitrite: Negative   Leuk Esterase: Moderate / RBC: 2-5 /HPF / WBC 11-25 /HPF   Sq Epi: x / Non Sq Epi: Many /HPF / Bacteria: Moderate /HPF        MEDICATIONS  (STANDING):    ascorbic acid 500 milliGRAM(s) Oral daily  carvedilol 25 milliGRAM(s) Oral every 12 hours  ferrous    sulfate 325 milliGRAM(s) Oral three times a day  gabapentin 300 milliGRAM(s) Oral two times a day  guaifenesin/dextromethorphan Oral Liquid 10 milliLiter(s) Oral every 6 hours  heparin   Injectable 5000 Unit(s) SubCutaneous every 8 hours  levothyroxine 75 MICROGram(s) Oral daily  pantoprazole    Tablet 40 milliGRAM(s) Oral before breakfast  sertraline 50 milliGRAM(s) Oral daily  vancomycin  IVPB 1000 milliGRAM(s) IV Intermittent every 12 hours        RADIOLOGY & ADDITIONAL TESTS:    21: MR Foot No Cont, Right (21 @ 16:58) : : Cutaneous wound of the dorsal hallux at the level of the proximal phalanx with adjacent cellulitic change. No evidence of adjacent osteomyelitis.    21: Xray Foot AP + Lateral + Oblique, Right (21 @ 09:04) First hallux soft tissue swelling with bandage artifact. Underlying osseous structures intact.      21: US Duplex Venous Lower Ext Ltd, Right (21 @ 15:57) No evidence of right lower extremity deep venous thrombosis.       MICROBIOLOGY DATA:    Culture - Urine (21 @ 03:31)   Specimen Source: Clean Catch Clean Catch (Midstream)   Culture Results: <10,000 CFU/mL Normal Urogenital Kiersten     Culture - Surgical Swab (21 @ 22:11)   - Ampicillin/Sulbactam: R <=8/4   - Cefazolin: R <=4   - Clindamycin: S <=0.25   - Daptomycin: S 0.5   - Erythromycin: R >4   - Gentamicin: S <=1 Should not be used as monotherapy   - Linezolid: S 2   - Oxacillin: R >2   - Penicillin: R >8   - RIF- Rifampin: S <=1 Should not be used as monotherapy   - Tetra/Doxy: R >8   - Trimethoprim/Sulfamethoxazole: S <=0.5/9.5   - Vancomycin: S 2   Specimen Source: .Surgical Swab R great toe abscess   Culture Results:   Numerous Methicillin Resistant Staphylococcus aureus   Organism Identification: Methicillin resistant Staphylococcus aureus     Culture - Blood (21 @ 18:31)   Specimen Source: .Blood Blood-Peripheral   Culture Results: No growth to date.     Culture - Blood (21 @ 18:31)   Specimen Source: .Blood Blood-Peripheral   Culture Results: No growth to date.     COVID-19 Bhargav Domain Antibody (21 @ 09:57)   COVID-19 Bhargav Domain Antibody Result: 148.00:             Patient is seen and examined at the bed side, is afebrile.       REVIEW OF SYSTEMS: All other review systems are negative      ALLERGIES: No Known Allergies      Vital Signs Last 24 Hrs  T(C): 36.7 (08 Aug 2021 14:23), Max: 36.7 (08 Aug 2021 14:23)  T(F): 98 (08 Aug 2021 14:23), Max: 98 (08 Aug 2021 14:23)  HR: 72 (08 Aug 2021 17:45) (70 - 77)  BP: 170/92 (08 Aug 2021 17:45) (135/65 - 170/92)  BP(mean): --  RR: 18 (08 Aug 2021 14:23) (18 - 18)  SpO2: 95% (08 Aug 2021 14:23) (95% - 96%)        PHYSICAL EXAM:  GENERAL: Not in distress   CHEST/LUNG: Not using accessory muscles   HEART: s1 and s2 present  ABDOMEN:  Nontender and  Nondistended  EXTREMITIES: Right foot bandage in placed   CNS: Awake and Alert      LABS:                        10.4   6.80  )-----------( 230      ( 08 Aug 2021 05:54 )             33.8                           10.2   5.56  )-----------( 227      ( 07 Aug 2021 06:33 )             33.1       08-08    139  |  105  |  14  ----------------------------<  117<H>  4.4   |  31  |  0.81    Ca    8.8      08 Aug 2021 05:54    TPro  6.4  /  Alb  2.7<L>  /  TBili  0.1<L>  /  DBili  x   /  AST  12  /  ALT  34  /  AlkPhos  83  08-07      08-07    141  |  108  |  15  ----------------------------<  119<H>  4.6   |  31  |  0.78    Ca    8.6      07 Aug 2021 06:33  Phos  3.7     08-06  Mg     1.9     08-06    TPro  6.4  /  Alb  2.7<L>  /  TBili  0.1<L>  /  DBili  x   /  AST  12  /  ALT  34  /  AlkPhos  83  08-07      PT/INR - ( 05 Aug 2021 14:05 )   PT: 12.4 sec;   INR: 1.04 ratio      PTT - ( 05 Aug 2021 14:05 )  PTT:31.5 sec        Urinalysis Basic - ( 05 Aug 2021 21:22 )  Color: Yellow / Appearance: Clear / S.010 / pH: x  Gluc: x / Ketone: Negative  / Bili: Negative / Urobili: Negative   Blood: x / Protein: Negative / Nitrite: Negative   Leuk Esterase: Moderate / RBC: 2-5 /HPF / WBC 11-25 /HPF   Sq Epi: x / Non Sq Epi: Many /HPF / Bacteria: Moderate /HPF      Vancomycin Level, Trough - Prior to Next Dose (21 @ 10:15)   Vancomycin Level, Trough: 14.5:       MEDICATIONS  (STANDING):    ascorbic acid 500 milliGRAM(s) Oral daily  carvedilol 25 milliGRAM(s) Oral every 12 hours  ferrous    sulfate 325 milliGRAM(s) Oral three times a day  gabapentin 300 milliGRAM(s) Oral two times a day  guaifenesin/dextromethorphan Oral Liquid 10 milliLiter(s) Oral every 6 hours  heparin   Injectable 5000 Unit(s) SubCutaneous every 8 hours  levothyroxine 75 MICROGram(s) Oral daily  pantoprazole    Tablet 40 milliGRAM(s) Oral before breakfast  sertraline 50 milliGRAM(s) Oral daily  vancomycin  IVPB 1000 milliGRAM(s) IV Intermittent every 12 hours        RADIOLOGY & ADDITIONAL TESTS:    21: MR Foot No Cont, Right (21 @ 16:58) : : Cutaneous wound of the dorsal hallux at the level of the proximal phalanx with adjacent cellulitic change. No evidence of adjacent osteomyelitis.    21: Xray Foot AP + Lateral + Oblique, Right (21 @ 09:04) First hallux soft tissue swelling with bandage artifact. Underlying osseous structures intact.      21: US Duplex Venous Lower Ext Ltd, Right (21 @ 15:57) No evidence of right lower extremity deep venous thrombosis.       MICROBIOLOGY DATA:    Culture - Urine (21 @ 03:31)   Specimen Source: Clean Catch Clean Catch (Midstream)   Culture Results: <10,000 CFU/mL Normal Urogenital Kiersten     Culture - Surgical Swab (21 @ 22:11)   - Ampicillin/Sulbactam: R <=8/4   - Cefazolin: R <=4   - Clindamycin: S <=0.25   - Daptomycin: S 0.5   - Erythromycin: R >4   - Gentamicin: S <=1 Should not be used as monotherapy   - Linezolid: S 2   - Oxacillin: R >2   - Penicillin: R >8   - RIF- Rifampin: S <=1 Should not be used as monotherapy   - Tetra/Doxy: R >8   - Trimethoprim/Sulfamethoxazole: S <=0.5/9.5   - Vancomycin: S 2   Specimen Source: .Surgical Swab R great toe abscess   Culture Results:   Numerous Methicillin Resistant Staphylococcus aureus   Organism Identification: Methicillin resistant Staphylococcus aureus     Culture - Blood (21 @ 18:31)   Specimen Source: .Blood Blood-Peripheral   Culture Results: No growth to date.     Culture - Blood (21 @ 18:31)   Specimen Source: .Blood Blood-Peripheral   Culture Results: No growth to date.     COVID-19 Bhargav Domain Antibody (21 @ 09:57)   COVID-19 Bhargav Domain Antibody Result: 148.00:

## 2021-08-09 ENCOUNTER — TRANSCRIPTION ENCOUNTER (OUTPATIENT)
Age: 76
End: 2021-08-09

## 2021-08-09 VITALS
SYSTOLIC BLOOD PRESSURE: 123 MMHG | DIASTOLIC BLOOD PRESSURE: 69 MMHG | HEART RATE: 60 BPM | RESPIRATION RATE: 16 BRPM | OXYGEN SATURATION: 96 % | TEMPERATURE: 98 F

## 2021-08-09 PROCEDURE — 85025 COMPLETE CBC W/AUTO DIFF WBC: CPT

## 2021-08-09 PROCEDURE — 82728 ASSAY OF FERRITIN: CPT

## 2021-08-09 PROCEDURE — 80061 LIPID PANEL: CPT

## 2021-08-09 PROCEDURE — 80048 BASIC METABOLIC PNL TOTAL CA: CPT

## 2021-08-09 PROCEDURE — 86769 SARS-COV-2 COVID-19 ANTIBODY: CPT

## 2021-08-09 PROCEDURE — 83605 ASSAY OF LACTIC ACID: CPT

## 2021-08-09 PROCEDURE — 87040 BLOOD CULTURE FOR BACTERIA: CPT

## 2021-08-09 PROCEDURE — 83540 ASSAY OF IRON: CPT

## 2021-08-09 PROCEDURE — 87635 SARS-COV-2 COVID-19 AMP PRB: CPT

## 2021-08-09 PROCEDURE — 81001 URINALYSIS AUTO W/SCOPE: CPT

## 2021-08-09 PROCEDURE — 85730 THROMBOPLASTIN TIME PARTIAL: CPT

## 2021-08-09 PROCEDURE — 85610 PROTHROMBIN TIME: CPT

## 2021-08-09 PROCEDURE — 84443 ASSAY THYROID STIM HORMONE: CPT

## 2021-08-09 PROCEDURE — 87186 SC STD MICRODIL/AGAR DIL: CPT

## 2021-08-09 PROCEDURE — 83550 IRON BINDING TEST: CPT

## 2021-08-09 PROCEDURE — 85027 COMPLETE CBC AUTOMATED: CPT

## 2021-08-09 PROCEDURE — 86140 C-REACTIVE PROTEIN: CPT

## 2021-08-09 PROCEDURE — 87086 URINE CULTURE/COLONY COUNT: CPT

## 2021-08-09 PROCEDURE — 73718 MRI LOWER EXTREMITY W/O DYE: CPT

## 2021-08-09 PROCEDURE — 85652 RBC SED RATE AUTOMATED: CPT

## 2021-08-09 PROCEDURE — 87070 CULTURE OTHR SPECIMN AEROBIC: CPT

## 2021-08-09 PROCEDURE — 84100 ASSAY OF PHOSPHORUS: CPT

## 2021-08-09 PROCEDURE — 86803 HEPATITIS C AB TEST: CPT

## 2021-08-09 PROCEDURE — 83036 HEMOGLOBIN GLYCOSYLATED A1C: CPT

## 2021-08-09 PROCEDURE — 36415 COLL VENOUS BLD VENIPUNCTURE: CPT

## 2021-08-09 PROCEDURE — 80053 COMPREHEN METABOLIC PANEL: CPT

## 2021-08-09 PROCEDURE — 99285 EMERGENCY DEPT VISIT HI MDM: CPT | Mod: 25

## 2021-08-09 PROCEDURE — 80202 ASSAY OF VANCOMYCIN: CPT

## 2021-08-09 PROCEDURE — 71045 X-RAY EXAM CHEST 1 VIEW: CPT

## 2021-08-09 PROCEDURE — 93005 ELECTROCARDIOGRAM TRACING: CPT

## 2021-08-09 PROCEDURE — 73630 X-RAY EXAM OF FOOT: CPT

## 2021-08-09 PROCEDURE — 93971 EXTREMITY STUDY: CPT

## 2021-08-09 PROCEDURE — 83735 ASSAY OF MAGNESIUM: CPT

## 2021-08-09 RX ORDER — ACETAMINOPHEN 500 MG
2 TABLET ORAL
Qty: 0 | Refills: 0 | DISCHARGE
Start: 2021-08-09

## 2021-08-09 RX ORDER — LOSARTAN POTASSIUM 100 MG/1
50 TABLET, FILM COATED ORAL DAILY
Refills: 0 | Status: DISCONTINUED | OUTPATIENT
Start: 2021-08-09 | End: 2021-08-09

## 2021-08-09 RX ORDER — GEMFIBROZIL 600 MG
1 TABLET ORAL
Qty: 0 | Refills: 0 | DISCHARGE

## 2021-08-09 RX ORDER — AZTREONAM 2 G
1 VIAL (EA) INJECTION
Qty: 22 | Refills: 0
Start: 2021-08-09 | End: 2021-08-19

## 2021-08-09 RX ORDER — AMLODIPINE BESYLATE 2.5 MG/1
10 TABLET ORAL DAILY
Refills: 0 | Status: DISCONTINUED | OUTPATIENT
Start: 2021-08-09 | End: 2021-08-09

## 2021-08-09 RX ORDER — DOXAZOSIN MESYLATE 4 MG
1 TABLET ORAL
Qty: 0 | Refills: 0 | DISCHARGE

## 2021-08-09 RX ADMIN — Medication 325 MILLIGRAM(S): at 06:08

## 2021-08-09 RX ADMIN — Medication 10 MILLILITER(S): at 06:08

## 2021-08-09 RX ADMIN — ZOLPIDEM TARTRATE 5 MILLIGRAM(S): 10 TABLET ORAL at 00:10

## 2021-08-09 RX ADMIN — CARVEDILOL PHOSPHATE 25 MILLIGRAM(S): 80 CAPSULE, EXTENDED RELEASE ORAL at 06:08

## 2021-08-09 RX ADMIN — Medication 75 MICROGRAM(S): at 06:07

## 2021-08-09 RX ADMIN — Medication 650 MILLIGRAM(S): at 09:21

## 2021-08-09 RX ADMIN — SERTRALINE 50 MILLIGRAM(S): 25 TABLET, FILM COATED ORAL at 12:39

## 2021-08-09 RX ADMIN — HEPARIN SODIUM 5000 UNIT(S): 5000 INJECTION INTRAVENOUS; SUBCUTANEOUS at 06:08

## 2021-08-09 RX ADMIN — Medication 250 MILLIGRAM(S): at 10:53

## 2021-08-09 RX ADMIN — PANTOPRAZOLE SODIUM 40 MILLIGRAM(S): 20 TABLET, DELAYED RELEASE ORAL at 06:08

## 2021-08-09 RX ADMIN — Medication 500 MILLIGRAM(S): at 12:39

## 2021-08-09 RX ADMIN — HEPARIN SODIUM 5000 UNIT(S): 5000 INJECTION INTRAVENOUS; SUBCUTANEOUS at 14:18

## 2021-08-09 RX ADMIN — Medication 10 MILLILITER(S): at 14:18

## 2021-08-09 RX ADMIN — Medication 650 MILLIGRAM(S): at 00:00

## 2021-08-09 RX ADMIN — GABAPENTIN 300 MILLIGRAM(S): 400 CAPSULE ORAL at 06:10

## 2021-08-09 RX ADMIN — Medication 325 MILLIGRAM(S): at 14:18

## 2021-08-09 NOTE — PROGRESS NOTE ADULT - SUBJECTIVE AND OBJECTIVE BOX
Patient is a 76y old  Female who presents with a chief complaint of skin infection (08 Aug 2021 20:20)    pt seen in icu [  ], reg med floor [   ], bed [  ], chair at bedside [   ], a+o x3 [  ], lethargic [  ],  nad [  ]    pereira [  ], ngt [  ], peg [  ], et tube [  ], cent line [  ], picc line [  ]        Allergies    No Known Allergies        Vitals    T(F): 98.6 (08-09-21 @ 05:35), Max: 98.6 (08-09-21 @ 05:35)  HR: 81 (08-09-21 @ 05:35) (70 - 81)  BP: 168/64 (08-09-21 @ 05:35) (135/65 - 170/92)  RR: 18 (08-09-21 @ 05:35) (18 - 18)  SpO2: 95% (08-09-21 @ 05:35) (95% - 95%)  Wt(kg): --  CAPILLARY BLOOD GLUCOSE          Labs                          10.4   6.80  )-----------( 230      ( 08 Aug 2021 05:54 )             33.8       08-08    139  |  105  |  14  ----------------------------<  117<H>  4.4   |  31  |  0.81    Ca    8.8      08 Aug 2021 05:54              Clean Catch Clean Catch (Midstream)  08-06 @ 03:31   <10,000 CFU/mL Normal Urogenital Kiersten  --  --      .Surgical Swab R great toe abscess  08-05 @ 22:11   Numerous Methicillin Resistant Staphylococcus aureus  --  Methicillin resistant Staphylococcus aureus      .Blood Blood-Peripheral  08-05 @ 18:31   No growth to date.  --  --          Radiology Results      Meds    MEDICATIONS  (STANDING):  ascorbic acid 500 milliGRAM(s) Oral daily  carvedilol 25 milliGRAM(s) Oral every 12 hours  ferrous    sulfate 325 milliGRAM(s) Oral three times a day  gabapentin 300 milliGRAM(s) Oral two times a day  guaifenesin/dextromethorphan Oral Liquid 10 milliLiter(s) Oral every 6 hours  heparin   Injectable 5000 Unit(s) SubCutaneous every 8 hours  levothyroxine 75 MICROGram(s) Oral daily  lidocaine 1% (Preservative-free) Injectable      lidocaine 1% (Preservative-free) Injectable 10 milliLiter(s) Local Injection once  lidocaine 1% (Preservative-free) Injectable 10 milliLiter(s) Local Injection once  pantoprazole    Tablet 40 milliGRAM(s) Oral before breakfast  sertraline 50 milliGRAM(s) Oral daily  vancomycin  IVPB 1000 milliGRAM(s) IV Intermittent every 12 hours      MEDICATIONS  (PRN):  acetaminophen   Tablet .. 650 milliGRAM(s) Oral every 6 hours PRN Mild Pain (1 - 3), Moderate Pain (4 - 6)  zolpidem 5 milliGRAM(s) Oral at bedtime PRN Insomnia  zolpidem 5 milliGRAM(s) Oral at bedtime PRN Insomnia      Physical Exam    Neuro :  no focal deficits  Respiratory: CTA B/L  CV: RRR, S1S2, no murmurs,   Abdominal: Soft, NT, ND +BS,  Extremities: No edema, + peripheral pulses    ASSESSMENT    Cutaneous abscess of right foot    History of hypertension    Hypercholesterolemia    Myocardial infarction syndrome    Osteoarthritis    Hypothyroid    CAD (coronary artery disease)    Depression    Hyperlipidemia    Anemia    Cholecystitis    History of angioplasty    History of shoulder surgery    H/O total knee replacement, right        PLAN     Patient is a 76y old  Female who presents with a chief complaint of skin infection (08 Aug 2021 20:20)    pt seen in icu [  ], reg med floor [ x  ], bed [ x ], chair at bedside [   ], a+o x3 [ x ], lethargic [  ],  nad [ x ]      Allergies    No Known Allergies        Vitals    T(F): 98.6 (08-09-21 @ 05:35), Max: 98.6 (08-09-21 @ 05:35)  HR: 81 (08-09-21 @ 05:35) (70 - 81)  BP: 168/64 (08-09-21 @ 05:35) (135/65 - 170/92)  RR: 18 (08-09-21 @ 05:35) (18 - 18)  SpO2: 95% (08-09-21 @ 05:35) (95% - 95%)  Wt(kg): --  CAPILLARY BLOOD GLUCOSE          Labs                          10.4   6.80  )-----------( 230      ( 08 Aug 2021 05:54 )             33.8       08-08    139  |  105  |  14  ----------------------------<  117<H>  4.4   |  31  |  0.81    Ca    8.8      08 Aug 2021 05:54              Clean Catch Clean Catch (Midstream)  08-06 @ 03:31   <10,000 CFU/mL Normal Urogenital Kiersten  --  --      .Surgical Swab R great toe abscess  08-05 @ 22:11   Numerous Methicillin Resistant Staphylococcus aureus  --  Methicillin resistant Staphylococcus aureus      .Blood Blood-Peripheral  08-05 @ 18:31   No growth to date.  --  --          Radiology Results      Meds    MEDICATIONS  (STANDING):  ascorbic acid 500 milliGRAM(s) Oral daily  carvedilol 25 milliGRAM(s) Oral every 12 hours  ferrous    sulfate 325 milliGRAM(s) Oral three times a day  gabapentin 300 milliGRAM(s) Oral two times a day  guaifenesin/dextromethorphan Oral Liquid 10 milliLiter(s) Oral every 6 hours  heparin   Injectable 5000 Unit(s) SubCutaneous every 8 hours  levothyroxine 75 MICROGram(s) Oral daily  lidocaine 1% (Preservative-free) Injectable      lidocaine 1% (Preservative-free) Injectable 10 milliLiter(s) Local Injection once  lidocaine 1% (Preservative-free) Injectable 10 milliLiter(s) Local Injection once  pantoprazole    Tablet 40 milliGRAM(s) Oral before breakfast  sertraline 50 milliGRAM(s) Oral daily  vancomycin  IVPB 1000 milliGRAM(s) IV Intermittent every 12 hours      MEDICATIONS  (PRN):  acetaminophen   Tablet .. 650 milliGRAM(s) Oral every 6 hours PRN Mild Pain (1 - 3), Moderate Pain (4 - 6)  zolpidem 5 milliGRAM(s) Oral at bedtime PRN Insomnia  zolpidem 5 milliGRAM(s) Oral at bedtime PRN Insomnia      Physical Exam      Neuro :  no focal deficits  Respiratory: CTA B/L  CV: RRR, S1S2, no murmurs,   Abdominal: Soft, NT, ND +BS,  Extremities: No edema, + peripheral pulses, right great toe dressing cdi      ASSESSMENT      right foot cellulitis with abcess,   osteo r/o,   h/o CAD,   HTN,   HLD,   hypothyroidism,  Osteoarthritis  Depression  Anemia  s/p angioplasty  s/p shoulder surgery  s/p total knee replacement, right        PLAN        mri right foot with Cutaneous wound of the dorsal hallux at the level of the proximal phalanx with adjacent cellulitic change. No evidence of adjacent osteomyelitis noted   wound cx with mrsa  id f/u   continue Vancomycin to cover MRSA   change to oral Bactrim DS 1 tab twice daily on discharge to continue until 8/20/21  podiatry f/u .  Dressed wound with DSD, ACE  Patient to keep dressing clean, dry and intact   Patient to stay heel WB R foot   cont current meds  pr stable for d/c

## 2021-08-09 NOTE — PROGRESS NOTE ADULT - NSICDXPILOT_GEN_ALL_CORE
[place]

## 2021-08-09 NOTE — PROGRESS NOTE ADULT - REASON FOR ADMISSION
skin infection
R foot cellulitis

## 2021-08-09 NOTE — DISCHARGE NOTE NURSING/CASE MANAGEMENT/SOCIAL WORK - PATIENT PORTAL LINK FT
You can access the FollowMyHealth Patient Portal offered by [facility] by registering at the following website: [email or web address]. By joining [facility]’s FollowMyHealth portal, you will also be able to view your health information using other applications (apps) compatible with our system.

## 2021-08-09 NOTE — PROGRESS NOTE ADULT - ASSESSMENT
A: R great toe abscess     P:  Patient evaluated, chart reviewed  Xrays reviewed  Labs reviewed   Monitor labs  Recommend MRI with Contrast per medicine   Dressed wound with Betadine, DSD, ACE  Patient to keep dressing clean, dry and intact   Patient to stay heel WB R foot   Possible bedside I&D tomorrow morning  Continue Antibiotics per medicine team  Discussed with Attending Dr. Cruz A: R great toe abscess     P:  Patient evaluated, chart reviewed  Xrays reviewed  Labs reviewed   Monitor labs  Recommend MRI with Contrast per medicine   Dressed wound with Betadine, DSD, ACE  Patient to keep dressing clean, dry and intact   Patient to stay heel WB R foot   Continue Antibiotics per medicine team  Discussed with Attending Dr. Cruz A: R great toe abscess     P:  Patient evaluated, chart reviewed  Xrays reviewed  Labs reviewed   Dressed wound with Betadine, DSD, ACE  Patient to keep dressing clean, dry and intact   Patient to stay heel WB R foot   Continue Antibiotics per medicine team  Stable from podiatry  WCO needed to change 3x per week  Change dressing using Betadine, 4x4s, Varun, ACE  Keep dressing clean, dry and intact when discharged   Heel WB to R foot using surgical shoe   Follow up in [address] [place] [address] Wednesday 8/11   Discussed with Attending Dr. Cruz and seen bedside with Dr. Neal  A: R great toe abscess     P:  Patient evaluated, chart reviewed  Xrays reviewed  Labs reviewed   Dressed wound with Betadine, DSD, ACE  Patient to keep dressing clean, dry and intact   Patient to stay heel WB R foot   Continue Antibiotics per medicine team  Stable from podiatry  Follow up in [address] [place] [address] Wednesday 8/11   Discussed with Attending Dr. Curz and seen bedside with Dr. Neal

## 2021-08-09 NOTE — PROGRESS NOTE ADULT - PROVIDER SPECIALTY LIST ADULT
Internal Medicine
Internal Medicine
Podiatry
Infectious Disease
Internal Medicine
Podiatry
Podiatry
Infectious Disease
Internal Medicine
Internal Medicine
Podiatry

## 2021-08-09 NOTE — CHART NOTE - NSCHARTNOTEFT_GEN_A_CORE
EVENT:   8/5/21, 4am, patient with right lower extremity cellulitis c/o pain 3/10 level and requested Tylenol.   HPI:    77 y/o female with PMHx of CAD, HTN, HLD, hypothyroidism, who came to the ED for swelling and tenderness on her RLE. Pt endorsed that a week ago she was in DR and developed a sore on the dorsal surface of her first big toe. She popped it and started draining fluids. Few days later, the affected area developed redness extended to her calf, associated with swelling and tenderness. Also endorses cough for 2 days, no sputum production.     OBJECTIVE:  Vital Signs Last 24 Hrs  T(C): 36.4 (05 Aug 2021 18:48), Max: 37 (05 Aug 2021 16:20)  T(F): 97.6 (05 Aug 2021 18:48), Max: 98.6 (05 Aug 2021 16:20)  HR: 66 (05 Aug 2021 18:48) (66 - 74)  BP: 109/63 (05 Aug 2021 18:48) (109/63 - 128/73)  BP(mean): --  RR: 18 (05 Aug 2021 18:48) (18 - 18)  SpO2: 96% (05 Aug 2021 18:48) (96% - 96%)    FOCUSED PHYSICAL EXAM:    LABS:                        10.7   8.45  )-----------( 240      ( 05 Aug 2021 14:05 )             33.7     08-05    137  |  106  |  18  ----------------------------<  105<H>  4.3   |  27  |  0.79    Ca    8.6      05 Aug 2021 14:05    TPro  7.3  /  Alb  3.4<L>  /  TBili  0.4  /  DBili  x   /  AST  28  /  ALT  56  /  AlkPhos  106  08-0    PLAN:   - Tylenol 650 mg po x1 dose for pain.     FOLLOW UP / RESULT:   - Reassess patient pain level,  - Maintain safety and comfort.
Request from Medicine Attg to facilitate patient's discharge, imaging and med rec reviewed with MD; patient cleared for discharge with resumption of  home meds ( home meds verified w/ pharmacy and patient) and w/ transition to PO abx with follow up as advised. Discussed with CM; home care services arranged and patient to be transported home at approx 4pm.
Asked to see patient with cough  patient seen and examined  notes reviewed    Vital Signs Last 24 Hrs  T(C): 36.7 (07 Aug 2021 13:56), Max: 36.7 (07 Aug 2021 13:56)  T(F): 98 (07 Aug 2021 13:56), Max: 98 (07 Aug 2021 13:56)  HR: 78 (07 Aug 2021 17:42) (64 - 78)  BP: 159/78 (07 Aug 2021 17:42) (116/65 - 159/78)  BP(mean): --  RR: 18 (07 Aug 2021 13:56) (18 - 18)  SpO2: 97% (07 Aug 2021 13:56) (95% - 97%)    ROS:  Patient c/o cough and throat discomfort  denies fevers, chills, abdominal pain, nausea, vomiting, loss of taste and smell or diarrhea.    Physical Assessment:  General:   No acute distress  Pulm:  Lungs clear bilaterally.  No rales, wheezes or rhonchi.  cough is moist and non productive.  Cardiac:  S1AS2  Abd;  soft and non tender.   bowel sounds present  Skin:  RLE remains edematous.    Assessment and Plan  76 year old female with a medical history of CAD, HTN, HLD and Hypothyroidism.  Patient admitted presented to [facility] with complaints of swelling and redness to his RLE, along with a sore to her right big toe.  Patient is being treated with antibiotics for a cellulitis and is now c/o a cough.       -  Chest xray ordered and pending  -  Robitussin PRN     Discussed with patient and family.

## 2021-08-09 NOTE — PROGRESS NOTE ADULT - SUBJECTIVE AND OBJECTIVE BOX
Podiatry HPI Interval: Patient seen resting comfortably in bed, AAOx3. She endorses pain to Right big toe which she rates as 4/10 on VAS. Denies any acute overnight events. Patient denies N/V/F/C/SOB.     Podiatry HPI: Patient presents to the ED for R foot cellulitis. Patient states she went to the [place] where she had a blister on her R great toe. Patient states as the week went on the blister got worse and worse. Patient states doctor from [place] gave her cream to apply but has not helped. Patient states R great toe is painful to touch and she is unable to walk. Patient denies any trauma to area. Denies N/V/F/C/SOB. No other pedal complaints.       Patient is a 76y old  Female who presents with a chief complaint of     HPI:  Patient is a 76F with PMHx of CAD, HTN, HLD, hypothyroidism , anemia, PSH total r knee replacement  presenting with chief complaint of right leg swelling. Patient spent the past few months in [place], just returning from the [place] today. 7 days ago, she developed a pimple on the dorsal surface of her first big toe. She popped it and started draining fluids. Blister returned and surrounding area started getting red. 5 days ago, she noted that the redness extended to her calf, and started getting swollen. Denied any fever, chills. Has cough for 2 days, no sputum production, SOB.  Denies nausea, vomiting, diarrhea, abdominal pain, SOB, chest pain, PRUITT, palpitations, dizziness, headache, cough, wheezing.       Medications acetaminophen   Tablet .. 650 milliGRAM(s) Oral every 6 hours PRN  ascorbic acid 500 milliGRAM(s) Oral daily  carvedilol 25 milliGRAM(s) Oral every 12 hours  ferrous    sulfate 325 milliGRAM(s) Oral three times a day  gabapentin 300 milliGRAM(s) Oral two times a day  guaifenesin/dextromethorphan Oral Liquid 10 milliLiter(s) Oral every 6 hours  heparin   Injectable 5000 Unit(s) SubCutaneous every 8 hours  levothyroxine 75 MICROGram(s) Oral daily  lidocaine 1% (Preservative-free) Injectable      lidocaine 1% (Preservative-free) Injectable 10 milliLiter(s) Local Injection once  lidocaine 1% (Preservative-free) Injectable 10 milliLiter(s) Local Injection once  pantoprazole    Tablet 40 milliGRAM(s) Oral before breakfast  sertraline 50 milliGRAM(s) Oral daily  vancomycin  IVPB 1000 milliGRAM(s) IV Intermittent every 12 hours  zolpidem 5 milliGRAM(s) Oral at bedtime PRN  zolpidem 5 milliGRAM(s) Oral at bedtime PRN    FHFamily history of cardiomyopathy (Mother)    Family history of coronary artery disease (Father, Sibling)    Family history of lung cancer (Sibling)    Family history of lung cancer (Father)    Family history of cardiomyopathy    Family history of coronary artery disease (Mother)    ,   PMHHistory of hypertension    Hypercholesterolemia    Myocardial infarction syndrome    Osteoarthritis    Hypothyroid    CAD (coronary artery disease)    Depression    Hyperlipidemia    Anemia       PSHCholecystitis    History of angioplasty    History of shoulder surgery    H/O total knee replacement, right        Labs                          10.4   6.80  )-----------( 230      ( 08 Aug 2021 05:54 )             33.8      08-08    139  |  105  |  14  ----------------------------<  117<H>  4.4   |  31  |  0.81    Ca    8.8      08 Aug 2021 05:54       Vital Signs Last 24 Hrs  T(C): 37 (09 Aug 2021 05:35), Max: 37 (09 Aug 2021 05:35)  T(F): 98.6 (09 Aug 2021 05:35), Max: 98.6 (09 Aug 2021 05:35)  HR: 81 (09 Aug 2021 05:35) (70 - 81)  BP: 168/64 (09 Aug 2021 05:35) (135/65 - 170/92)  BP(mean): --  RR: 18 (09 Aug 2021 05:35) (18 - 18)  SpO2: 95% (09 Aug 2021 05:35) (95% - 95%)  Sedimentation Rate, Erythrocyte: 6 mm/Hr (08-06-21 @ 06:03)         C-Reactive Protein, Serum: 32 mg/L (08-06-21 @ 10:02)       PHYSICAL EXAM  GEN: OFELIA MOSES is a pleasant well-nourished, well developed 76y Female in no acute distress, alert awake, and oriented to person, place and time.   LE Focused:  RLE   Vasc: DP/PT 2/4. CFT <3seconds to distal tuft, Skin temp warm to cool proximal to distal  Derm: improved erythema to forefoot since previous examination, Wound on dorsal R great toe measuring ~2cm x 1cm x 0.3 cm, with improved periwound erythema, no purulent drainage, no fluctuance, no tunneling/tracking, +PTB  Neuro: Protective sensation grossly intact at level of digits   MSK: mild tenderness to palpation to R great toe. Able to wiggle toes R, muscle strength 5/5 in all compartments     IMAGING:   EXAM:  XR FOOT COMP MIN 3 VIEWS RT                            PROCEDURE DATE:  08/06/2021          INTERPRETATION:  RIGHT foot    CLINICAL INFORMATION: RIGHT great toe abscess. TECHNIQUE: AP,lateral and oblique views.    FINDINGS:  Soft tissue swelling of first hallux with bandage artifacts. No subcutaneous air or radiopaque foreign body. No radiographic evidence of underlying fracture or osteomyelitis. Remaining osseous and joint structures intact.    IMPRESSION:    First hallux soft tissue swelling with bandage artifact. Underlying osseous structures intact.            MR Foot No Cont, Right (08.06.21 @ 16:58)     EXAM:  MR FOOT RT                            PROCEDURE DATE:  08/06/2021          INTERPRETATION:  EXAMINATION: MRI of the right forefoot without contrast    CLINICAL INFORMATION: Infected foot ulcer    TECHNIQUE: Multiplanar, multisequential MR imaging was performed.    FINDINGS: There is a cutaneous wound of the dorsal hallux at the level of the proximal phalanx with adjacent skin thickening subcutaneous infiltration, consistent with cellulitis. There are no areas of adjacent marrow signal alteration that are suspicious for osteomyelitis. No well-formed collections are demonstrated, however, evaluation is limited without contrast. There is no acute fracture. There is calcaneocuboid arthrosis with marginating subchondral cystic change and marrow edema on both sides of the joint. There is also mild fourth and fifth tarsometatarsal arthrosis with marginating edema. There are no other advanced arthritic changes. There is nonspecific dorsal subcutaneous soft tissue edema.    IMPRESSION: Cutaneous wound of the dorsal hallux at the level of the proximal phalanx with adjacent cellulitic change. No evidence of adjacent osteomyelitis.    MRI with contrast pending (8/7)    Culture Results: Numerous Staphylococcus aureus (08.05.21 @ 22:11)    US Duplex: EXAM:  US DPLX LWR EXT VEINS LTD RT                            PROCEDURE DATE:  08/05/2021          INTERPRETATION:  CLINICAL INFORMATION: Right leg edema and swelling    COMPARISON: None available.    TECHNIQUE: Duplex sonography of the RIGHT LOWER extremity veins with color and spectral Doppler, with and without compression.    FINDINGS:    There is normal compressibility of the right common femoral, femoral and popliteal veins.  Doppler examination shows normal spontaneous and phasic flow.    No calf vein thrombosis is detected.    IMPRESSION:  No evidence of right lower extremity deep venous thrombosis.

## 2021-08-10 LAB
CULTURE RESULTS: SIGNIFICANT CHANGE UP
ORGANISM # SPEC MICROSCOPIC CNT: SIGNIFICANT CHANGE UP
ORGANISM # SPEC MICROSCOPIC CNT: SIGNIFICANT CHANGE UP
SPECIMEN SOURCE: SIGNIFICANT CHANGE UP

## 2021-08-18 ENCOUNTER — INPATIENT (INPATIENT)
Facility: HOSPITAL | Age: 76
LOS: 4 days | Discharge: HOME | DRG: 683 | End: 2021-08-23
Attending: INTERNAL MEDICINE | Admitting: INTERNAL MEDICINE
Payer: COMMERCIAL

## 2021-08-18 VITALS
SYSTOLIC BLOOD PRESSURE: 116 MMHG | DIASTOLIC BLOOD PRESSURE: 68 MMHG | TEMPERATURE: 98 F | RESPIRATION RATE: 18 BRPM | HEART RATE: 58 BPM | OXYGEN SATURATION: 96 % | HEIGHT: 59 IN | WEIGHT: 164.02 LBS

## 2021-08-18 DIAGNOSIS — I25.10 ATHEROSCLEROTIC HEART DISEASE OF NATIVE CORONARY ARTERY WITHOUT ANGINA PECTORIS: ICD-10-CM

## 2021-08-18 DIAGNOSIS — N17.9 ACUTE KIDNEY FAILURE, UNSPECIFIED: ICD-10-CM

## 2021-08-18 DIAGNOSIS — Z29.9 ENCOUNTER FOR PROPHYLACTIC MEASURES, UNSPECIFIED: ICD-10-CM

## 2021-08-18 DIAGNOSIS — E78.5 HYPERLIPIDEMIA, UNSPECIFIED: ICD-10-CM

## 2021-08-18 DIAGNOSIS — I10 ESSENTIAL (PRIMARY) HYPERTENSION: ICD-10-CM

## 2021-08-18 DIAGNOSIS — Z98.89 OTHER SPECIFIED POSTPROCEDURAL STATES: Chronic | ICD-10-CM

## 2021-08-18 DIAGNOSIS — E87.5 HYPERKALEMIA: ICD-10-CM

## 2021-08-18 DIAGNOSIS — E03.9 HYPOTHYROIDISM, UNSPECIFIED: ICD-10-CM

## 2021-08-18 DIAGNOSIS — K81.9 CHOLECYSTITIS, UNSPECIFIED: Chronic | ICD-10-CM

## 2021-08-18 DIAGNOSIS — E11.621 TYPE 2 DIABETES MELLITUS WITH FOOT ULCER: ICD-10-CM

## 2021-08-18 DIAGNOSIS — Z96.651 PRESENCE OF RIGHT ARTIFICIAL KNEE JOINT: Chronic | ICD-10-CM

## 2021-08-18 LAB
ANION GAP SERPL CALC-SCNC: 4 MMOL/L — LOW (ref 5–17)
ANION GAP SERPL CALC-SCNC: 5 MMOL/L — SIGNIFICANT CHANGE UP (ref 5–17)
APPEARANCE UR: CLEAR — SIGNIFICANT CHANGE UP
BASOPHILS # BLD AUTO: 0.04 K/UL — SIGNIFICANT CHANGE UP (ref 0–0.2)
BASOPHILS NFR BLD AUTO: 0.8 % — SIGNIFICANT CHANGE UP (ref 0–2)
BILIRUB UR-MCNC: NEGATIVE — SIGNIFICANT CHANGE UP
BUN SERPL-MCNC: 29 MG/DL — HIGH (ref 7–18)
BUN SERPL-MCNC: 30 MG/DL — HIGH (ref 7–18)
CALCIUM SERPL-MCNC: 8.5 MG/DL — SIGNIFICANT CHANGE UP (ref 8.4–10.5)
CALCIUM SERPL-MCNC: 9 MG/DL — SIGNIFICANT CHANGE UP (ref 8.4–10.5)
CHLORIDE SERPL-SCNC: 107 MMOL/L — SIGNIFICANT CHANGE UP (ref 96–108)
CHLORIDE SERPL-SCNC: 107 MMOL/L — SIGNIFICANT CHANGE UP (ref 96–108)
CO2 SERPL-SCNC: 24 MMOL/L — SIGNIFICANT CHANGE UP (ref 22–31)
CO2 SERPL-SCNC: 26 MMOL/L — SIGNIFICANT CHANGE UP (ref 22–31)
COLOR SPEC: YELLOW — SIGNIFICANT CHANGE UP
CREAT ?TM UR-MCNC: 64 MG/DL — SIGNIFICANT CHANGE UP
CREAT SERPL-MCNC: 1.24 MG/DL — SIGNIFICANT CHANGE UP (ref 0.5–1.3)
CREAT SERPL-MCNC: 1.3 MG/DL — SIGNIFICANT CHANGE UP (ref 0.5–1.3)
DIFF PNL FLD: NEGATIVE — SIGNIFICANT CHANGE UP
EOSINOPHIL # BLD AUTO: 0.29 K/UL — SIGNIFICANT CHANGE UP (ref 0–0.5)
EOSINOPHIL NFR BLD AUTO: 5.7 % — SIGNIFICANT CHANGE UP (ref 0–6)
GLUCOSE SERPL-MCNC: 117 MG/DL — HIGH (ref 70–99)
GLUCOSE SERPL-MCNC: 142 MG/DL — HIGH (ref 70–99)
GLUCOSE UR QL: NEGATIVE — SIGNIFICANT CHANGE UP
HCT VFR BLD CALC: 34.1 % — LOW (ref 34.5–45)
HGB BLD-MCNC: 10.5 G/DL — LOW (ref 11.5–15.5)
IMM GRANULOCYTES NFR BLD AUTO: 0.8 % — SIGNIFICANT CHANGE UP (ref 0–1.5)
KETONES UR-MCNC: NEGATIVE — SIGNIFICANT CHANGE UP
LEUKOCYTE ESTERASE UR-ACNC: ABNORMAL
LYMPHOCYTES # BLD AUTO: 0.99 K/UL — LOW (ref 1–3.3)
LYMPHOCYTES # BLD AUTO: 19.4 % — SIGNIFICANT CHANGE UP (ref 13–44)
MAGNESIUM SERPL-MCNC: 2.1 MG/DL — SIGNIFICANT CHANGE UP (ref 1.6–2.6)
MCHC RBC-ENTMCNC: 29.4 PG — SIGNIFICANT CHANGE UP (ref 27–34)
MCHC RBC-ENTMCNC: 30.8 GM/DL — LOW (ref 32–36)
MCV RBC AUTO: 95.5 FL — SIGNIFICANT CHANGE UP (ref 80–100)
MONOCYTES # BLD AUTO: 0.65 K/UL — SIGNIFICANT CHANGE UP (ref 0–0.9)
MONOCYTES NFR BLD AUTO: 12.7 % — SIGNIFICANT CHANGE UP (ref 2–14)
NEUTROPHILS # BLD AUTO: 3.1 K/UL — SIGNIFICANT CHANGE UP (ref 1.8–7.4)
NEUTROPHILS NFR BLD AUTO: 60.6 % — SIGNIFICANT CHANGE UP (ref 43–77)
NITRITE UR-MCNC: NEGATIVE — SIGNIFICANT CHANGE UP
NRBC # BLD: 0 /100 WBCS — SIGNIFICANT CHANGE UP (ref 0–0)
NT-PROBNP SERPL-SCNC: 559 PG/ML — HIGH (ref 0–450)
PH UR: 5 — SIGNIFICANT CHANGE UP (ref 5–8)
PHOSPHATE SERPL-MCNC: 3.4 MG/DL — SIGNIFICANT CHANGE UP (ref 2.5–4.5)
PLATELET # BLD AUTO: 261 K/UL — SIGNIFICANT CHANGE UP (ref 150–400)
POTASSIUM SERPL-MCNC: 5.1 MMOL/L — SIGNIFICANT CHANGE UP (ref 3.5–5.3)
POTASSIUM SERPL-MCNC: 6.3 MMOL/L — CRITICAL HIGH (ref 3.5–5.3)
POTASSIUM SERPL-SCNC: 5.1 MMOL/L — SIGNIFICANT CHANGE UP (ref 3.5–5.3)
POTASSIUM SERPL-SCNC: 6.3 MMOL/L — CRITICAL HIGH (ref 3.5–5.3)
PROT UR-MCNC: NEGATIVE — SIGNIFICANT CHANGE UP
RBC # BLD: 3.57 M/UL — LOW (ref 3.8–5.2)
RBC # FLD: 14.3 % — SIGNIFICANT CHANGE UP (ref 10.3–14.5)
SARS-COV-2 RNA SPEC QL NAA+PROBE: SIGNIFICANT CHANGE UP
SODIUM SERPL-SCNC: 135 MMOL/L — SIGNIFICANT CHANGE UP (ref 135–145)
SODIUM SERPL-SCNC: 138 MMOL/L — SIGNIFICANT CHANGE UP (ref 135–145)
SODIUM UR-SCNC: 135 MMOL/L — SIGNIFICANT CHANGE UP
SP GR SPEC: 1.01 — SIGNIFICANT CHANGE UP (ref 1.01–1.02)
UROBILINOGEN FLD QL: NEGATIVE — SIGNIFICANT CHANGE UP
WBC # BLD: 5.11 K/UL — SIGNIFICANT CHANGE UP (ref 3.8–10.5)
WBC # FLD AUTO: 5.11 K/UL — SIGNIFICANT CHANGE UP (ref 3.8–10.5)

## 2021-08-18 PROCEDURE — 99285 EMERGENCY DEPT VISIT HI MDM: CPT

## 2021-08-18 RX ORDER — SODIUM CHLORIDE 9 MG/ML
1000 INJECTION INTRAMUSCULAR; INTRAVENOUS; SUBCUTANEOUS
Refills: 0 | Status: DISCONTINUED | OUTPATIENT
Start: 2021-08-18 | End: 2021-08-23

## 2021-08-18 RX ORDER — ATORVASTATIN CALCIUM 80 MG/1
10 TABLET, FILM COATED ORAL AT BEDTIME
Refills: 0 | Status: DISCONTINUED | OUTPATIENT
Start: 2021-08-18 | End: 2021-08-23

## 2021-08-18 RX ORDER — VANCOMYCIN HCL 1 G
VIAL (EA) INTRAVENOUS
Refills: 0 | Status: DISCONTINUED | OUTPATIENT
Start: 2021-08-18 | End: 2021-08-19

## 2021-08-18 RX ORDER — ZOLPIDEM TARTRATE 10 MG/1
5 TABLET ORAL AT BEDTIME
Refills: 0 | Status: DISCONTINUED | OUTPATIENT
Start: 2021-08-18 | End: 2021-08-23

## 2021-08-18 RX ORDER — CARVEDILOL PHOSPHATE 80 MG/1
25 CAPSULE, EXTENDED RELEASE ORAL EVERY 12 HOURS
Refills: 0 | Status: DISCONTINUED | OUTPATIENT
Start: 2021-08-18 | End: 2021-08-23

## 2021-08-18 RX ORDER — SERTRALINE 25 MG/1
100 TABLET, FILM COATED ORAL DAILY
Refills: 0 | Status: DISCONTINUED | OUTPATIENT
Start: 2021-08-18 | End: 2021-08-23

## 2021-08-18 RX ORDER — VANCOMYCIN HCL 1 G
1000 VIAL (EA) INTRAVENOUS ONCE
Refills: 0 | Status: COMPLETED | OUTPATIENT
Start: 2021-08-18 | End: 2021-08-18

## 2021-08-18 RX ORDER — SODIUM CHLORIDE 9 MG/ML
1000 INJECTION INTRAMUSCULAR; INTRAVENOUS; SUBCUTANEOUS ONCE
Refills: 0 | Status: COMPLETED | OUTPATIENT
Start: 2021-08-18 | End: 2021-08-18

## 2021-08-18 RX ORDER — ASCORBIC ACID 60 MG
500 TABLET,CHEWABLE ORAL DAILY
Refills: 0 | Status: DISCONTINUED | OUTPATIENT
Start: 2021-08-18 | End: 2021-08-23

## 2021-08-18 RX ORDER — ACETAMINOPHEN 500 MG
650 TABLET ORAL EVERY 6 HOURS
Refills: 0 | Status: DISCONTINUED | OUTPATIENT
Start: 2021-08-18 | End: 2021-08-23

## 2021-08-18 RX ORDER — HEPARIN SODIUM 5000 [USP'U]/ML
5000 INJECTION INTRAVENOUS; SUBCUTANEOUS EVERY 8 HOURS
Refills: 0 | Status: DISCONTINUED | OUTPATIENT
Start: 2021-08-18 | End: 2021-08-23

## 2021-08-18 RX ORDER — CLOPIDOGREL BISULFATE 75 MG/1
75 TABLET, FILM COATED ORAL DAILY
Refills: 0 | Status: DISCONTINUED | OUTPATIENT
Start: 2021-08-18 | End: 2021-08-23

## 2021-08-18 RX ORDER — LEVOTHYROXINE SODIUM 125 MCG
75 TABLET ORAL DAILY
Refills: 0 | Status: DISCONTINUED | OUTPATIENT
Start: 2021-08-18 | End: 2021-08-23

## 2021-08-18 RX ORDER — GABAPENTIN 400 MG/1
300 CAPSULE ORAL
Refills: 0 | Status: DISCONTINUED | OUTPATIENT
Start: 2021-08-18 | End: 2021-08-23

## 2021-08-18 RX ORDER — CALCIUM GLUCONATE 100 MG/ML
1 VIAL (ML) INTRAVENOUS ONCE
Refills: 0 | Status: COMPLETED | OUTPATIENT
Start: 2021-08-18 | End: 2021-08-18

## 2021-08-18 RX ORDER — SODIUM BICARBONATE 1 MEQ/ML
50 SYRINGE (ML) INTRAVENOUS ONCE
Refills: 0 | Status: COMPLETED | OUTPATIENT
Start: 2021-08-18 | End: 2021-08-18

## 2021-08-18 RX ORDER — INSULIN HUMAN 100 [IU]/ML
10 INJECTION, SOLUTION SUBCUTANEOUS ONCE
Refills: 0 | Status: COMPLETED | OUTPATIENT
Start: 2021-08-18 | End: 2021-08-18

## 2021-08-18 RX ORDER — SODIUM POLYSTYRENE SULFONATE 4.1 MEQ/G
30 POWDER, FOR SUSPENSION ORAL ONCE
Refills: 0 | Status: COMPLETED | OUTPATIENT
Start: 2021-08-18 | End: 2021-08-18

## 2021-08-18 RX ORDER — VANCOMYCIN HCL 1 G
1000 VIAL (EA) INTRAVENOUS EVERY 24 HOURS
Refills: 0 | Status: DISCONTINUED | OUTPATIENT
Start: 2021-08-19 | End: 2021-08-19

## 2021-08-18 RX ORDER — LOSARTAN POTASSIUM 100 MG/1
50 TABLET, FILM COATED ORAL DAILY
Refills: 0 | Status: DISCONTINUED | OUTPATIENT
Start: 2021-08-18 | End: 2021-08-18

## 2021-08-18 RX ORDER — FERROUS SULFATE 325(65) MG
325 TABLET ORAL THREE TIMES A DAY
Refills: 0 | Status: DISCONTINUED | OUTPATIENT
Start: 2021-08-18 | End: 2021-08-23

## 2021-08-18 RX ORDER — AMLODIPINE BESYLATE 2.5 MG/1
10 TABLET ORAL DAILY
Refills: 0 | Status: DISCONTINUED | OUTPATIENT
Start: 2021-08-18 | End: 2021-08-23

## 2021-08-18 RX ORDER — DEXTROSE 50 % IN WATER 50 %
25 SYRINGE (ML) INTRAVENOUS ONCE
Refills: 0 | Status: COMPLETED | OUTPATIENT
Start: 2021-08-18 | End: 2021-08-18

## 2021-08-18 RX ORDER — PANTOPRAZOLE SODIUM 20 MG/1
40 TABLET, DELAYED RELEASE ORAL
Refills: 0 | Status: DISCONTINUED | OUTPATIENT
Start: 2021-08-18 | End: 2021-08-23

## 2021-08-18 RX ADMIN — SODIUM CHLORIDE 75 MILLILITER(S): 9 INJECTION INTRAMUSCULAR; INTRAVENOUS; SUBCUTANEOUS at 17:41

## 2021-08-18 RX ADMIN — Medication 50 MILLIEQUIVALENT(S): at 13:16

## 2021-08-18 RX ADMIN — Medication 100 GRAM(S): at 13:16

## 2021-08-18 RX ADMIN — Medication 325 MILLIGRAM(S): at 22:40

## 2021-08-18 RX ADMIN — INSULIN HUMAN 10 UNIT(S): 100 INJECTION, SOLUTION SUBCUTANEOUS at 13:17

## 2021-08-18 RX ADMIN — ATORVASTATIN CALCIUM 10 MILLIGRAM(S): 80 TABLET, FILM COATED ORAL at 22:40

## 2021-08-18 RX ADMIN — GABAPENTIN 300 MILLIGRAM(S): 400 CAPSULE ORAL at 17:41

## 2021-08-18 RX ADMIN — SODIUM CHLORIDE 75 MILLILITER(S): 9 INJECTION INTRAMUSCULAR; INTRAVENOUS; SUBCUTANEOUS at 22:40

## 2021-08-18 RX ADMIN — Medication 1000 MILLIGRAM(S): at 17:41

## 2021-08-18 RX ADMIN — Medication 25 GRAM(S): at 13:16

## 2021-08-18 RX ADMIN — CARVEDILOL PHOSPHATE 25 MILLIGRAM(S): 80 CAPSULE, EXTENDED RELEASE ORAL at 17:41

## 2021-08-18 RX ADMIN — SODIUM POLYSTYRENE SULFONATE 30 GRAM(S): 4.1 POWDER, FOR SUSPENSION ORAL at 13:48

## 2021-08-18 RX ADMIN — SODIUM CHLORIDE 1000 MILLILITER(S): 9 INJECTION INTRAMUSCULAR; INTRAVENOUS; SUBCUTANEOUS at 13:17

## 2021-08-18 RX ADMIN — ZOLPIDEM TARTRATE 5 MILLIGRAM(S): 10 TABLET ORAL at 22:40

## 2021-08-18 RX ADMIN — Medication 1 GRAM(S): at 13:23

## 2021-08-18 RX ADMIN — HEPARIN SODIUM 5000 UNIT(S): 5000 INJECTION INTRAVENOUS; SUBCUTANEOUS at 22:40

## 2021-08-18 NOTE — ED PROVIDER NOTE - OBJECTIVE STATEMENT
Patient sent by PMD Dr. Alan Rader for hyperkalemia. Patient reports she had a routine physical on 8/16. PMD called her back to office yesterday to recheck high potassium level. Called her today to say potassium had increased more. Patient denies sx. No fever, ha, cp, sob, ap, n/v/d, focal weakness, paresthesias.

## 2021-08-18 NOTE — ED ADULT NURSE NOTE - NSIMPLEMENTINTERV_GEN_ALL_ED
Implemented All Universal Safety Interventions:  [place] to call system. Call bell, personal items and telephone within reach. Instruct patient to call for assistance. Room bathroom lighting operational. Non-slip footwear when patient is off stretcher. Physically safe environment: no spills, clutter or unnecessary equipment. Stretcher in lowest position, wheels locked, appropriate side rails in place.

## 2021-08-18 NOTE — ED ADULT NURSE NOTE - OBJECTIVE STATEMENT
Pt. sent by PCP for abnormal labs. As per pt. her potassium is elevated. Pt. c/o dizziness and palpitations but denies any chest pain.

## 2021-08-18 NOTE — H&P ADULT - HISTORY OF PRESENT ILLNESS
Patient is a 76yoF, w/ PMH of CAD, HTN, HLD, and hypthyrodisim, sent in by her PCP for hyperkalemia. Patient reports she has had generalized weakness and some and pressure-like left-sided chest pain, which all self-resolved. Patient was recently discharged from the hospital with bactrim for cellulitis of right foot. Patient denies fever, chills, n/v, SOB, palpitation, abdominal pain, urinary or bowel movement changes.

## 2021-08-18 NOTE — H&P ADULT - MUSCULOSKELETAL
detailed exam normal/ROM intact/no joint swelling/no joint erythema/no calf tenderness/normal strength

## 2021-08-18 NOTE — H&P ADULT - PROBLEM SELECTOR PLAN 5
- h/o HTN, irbesartan 150mg qd, norvasc 10mg qd  - c/w home meds w/ therapeutic conversion  - monitor BP - h/o hypothyroidism, on synthroid 75mcg  - TSH 4.44 (8/6/2021)  - c/w home meds

## 2021-08-18 NOTE — H&P ADULT - PROBLEM SELECTOR PLAN 6
- h/o HLD, not on statin  - chol 170, BBE489, HLD 38  - started on statin - h/o HTN, irbesartan 150mg qd, norvasc 10mg qd  - c/w home meds w/ therapeutic conversion  - monitor BP

## 2021-08-18 NOTE — ED PROVIDER NOTE - NSICDXFAMILYHX_GEN_ALL_CORE_FT
FAMILY HISTORY:  Family history of cardiomyopathy, Mother  at  63 y.o    Father  Still living? No  Family history of lung cancer, Age at diagnosis: Age Unknown    Mother  Still living? No  Family history of coronary artery disease, Age at diagnosis: Age Unknown

## 2021-08-18 NOTE — H&P ADULT - PROBLEM SELECTOR PLAN 3
- h/o CAD, on plavix 75mg only  - c/w plavix 75mg and start on statin - recently discharged with bactrim for MRSA infected ulcer  - wound is clean, no erythema noted  - c/w vancomycin x2d    - ID, Dr. Garces, consulted

## 2021-08-18 NOTE — H&P ADULT - PROBLEM SELECTOR PLAN 2
- recently discharged with bactrim for MRSA infected ulcer  - wound is clean, no erythema noted  - c/w vancomycin x2d    - ID, Dr. Garces, consulted - noted to have Cr 1.30 (last Cr 0.81)  - likely 2/2 bactrim  - c/w IVF  - monitor Cr  - f/u urine e-lyte

## 2021-08-18 NOTE — H&P ADULT - ASSESSMENT
Patient is a 76yoF, w/ PMH of CAD, HTN, HLD, and hypthyrodisim, sent in by her PCP for hyperkalemia. Admitted for symptomatic hyperkalemia.

## 2021-08-18 NOTE — H&P ADULT - PROBLEM SELECTOR PLAN 4
- h/o hypothyroidism, on synthroid 75mcg  - TSH 4.44 (8/6/2021)  - c/w home meds - h/o CAD, on plavix 75mg only  - c/w plavix 75mg and start on statin

## 2021-08-18 NOTE — H&P ADULT - ATTENDING COMMENTS
76yoF, w/ PMH of CAD, HTN, HLD, and hypthyrodisim, sent in by her PCP for hyperkalemia. Patient reports she has had generalized weakness and some and pressure-like left-sided chest pain, which all self-resolved. Patient was recently discharged from the hospital with bactrim for cellulitis of right foot. Patient denies fever, chills, n/v, SOB, palpitation, abdominal pain, urinary or bowel movement changes.       assessment  --  hyperkalemia, h/o CAD, HTN, HLD, hypthyrodisim, right foot abscess with mrsa on bactrim    plan  --  admit to med, lokelma, calcium gluconate and insulin, vanco 1g q12 x 2 days, cont preadmit home meds, gi and dvt prophylaxis, ivf  cbc, bmp, mg, phos, lipids, tsh,

## 2021-08-18 NOTE — H&P ADULT - PROBLEM SELECTOR PLAN 1
- sent in by PCP for hyperkalemia  - c/o generalized weakness  - likely 2/2 bactrim  - K+ 6.4  - EKG no changes  - s/p cocktail in ED  - c/w IVF, and cocktail as needed  - monitor BMP q4h until resolved

## 2021-08-18 NOTE — H&P ADULT - PROBLEM SELECTOR PLAN 7
- heparin SQ for DVT ppx, transition to lovenox when kidney fx improves - h/o HLD, not on statin  - chol 170, ITB205, HLD 38  - started on statin

## 2021-08-18 NOTE — ED CLERICAL - CLERICAL COMMENTS
bed assigned at 14:24 to 502b bed assigned at 14:24 to 502b bed pulled back pt is iso for mrsa as per [facility] pt is covid neg, pt has been assigned to 507a

## 2021-08-19 ENCOUNTER — TRANSCRIPTION ENCOUNTER (OUTPATIENT)
Age: 76
End: 2021-08-19

## 2021-08-19 LAB
ANION GAP SERPL CALC-SCNC: 5 MMOL/L — SIGNIFICANT CHANGE UP (ref 5–17)
BASOPHILS # BLD AUTO: 0.03 K/UL — SIGNIFICANT CHANGE UP (ref 0–0.2)
BASOPHILS NFR BLD AUTO: 0.5 % — SIGNIFICANT CHANGE UP (ref 0–2)
BUN SERPL-MCNC: 25 MG/DL — HIGH (ref 7–18)
CALCIUM SERPL-MCNC: 9.1 MG/DL — SIGNIFICANT CHANGE UP (ref 8.4–10.5)
CHLORIDE SERPL-SCNC: 108 MMOL/L — SIGNIFICANT CHANGE UP (ref 96–108)
CO2 SERPL-SCNC: 27 MMOL/L — SIGNIFICANT CHANGE UP (ref 22–31)
COVID-19 SPIKE DOMAIN AB INTERP: POSITIVE
COVID-19 SPIKE DOMAIN ANTIBODY RESULT: 136 U/ML — HIGH
CREAT SERPL-MCNC: 1.11 MG/DL — SIGNIFICANT CHANGE UP (ref 0.5–1.3)
EOSINOPHIL # BLD AUTO: 0.36 K/UL — SIGNIFICANT CHANGE UP (ref 0–0.5)
EOSINOPHIL NFR BLD AUTO: 6.4 % — HIGH (ref 0–6)
GLUCOSE BLDC GLUCOMTR-MCNC: 151 MG/DL — HIGH (ref 70–99)
GLUCOSE BLDC GLUCOMTR-MCNC: 178 MG/DL — HIGH (ref 70–99)
GLUCOSE SERPL-MCNC: 105 MG/DL — HIGH (ref 70–99)
HCT VFR BLD CALC: 34.2 % — LOW (ref 34.5–45)
HGB BLD-MCNC: 10.4 G/DL — LOW (ref 11.5–15.5)
IMM GRANULOCYTES NFR BLD AUTO: 0.4 % — SIGNIFICANT CHANGE UP (ref 0–1.5)
LYMPHOCYTES # BLD AUTO: 1.23 K/UL — SIGNIFICANT CHANGE UP (ref 1–3.3)
LYMPHOCYTES # BLD AUTO: 21.8 % — SIGNIFICANT CHANGE UP (ref 13–44)
MAGNESIUM SERPL-MCNC: 2 MG/DL — SIGNIFICANT CHANGE UP (ref 1.6–2.6)
MCHC RBC-ENTMCNC: 29.7 PG — SIGNIFICANT CHANGE UP (ref 27–34)
MCHC RBC-ENTMCNC: 30.4 GM/DL — LOW (ref 32–36)
MCV RBC AUTO: 97.7 FL — SIGNIFICANT CHANGE UP (ref 80–100)
MONOCYTES # BLD AUTO: 0.65 K/UL — SIGNIFICANT CHANGE UP (ref 0–0.9)
MONOCYTES NFR BLD AUTO: 11.5 % — SIGNIFICANT CHANGE UP (ref 2–14)
NEUTROPHILS # BLD AUTO: 3.34 K/UL — SIGNIFICANT CHANGE UP (ref 1.8–7.4)
NEUTROPHILS NFR BLD AUTO: 59.4 % — SIGNIFICANT CHANGE UP (ref 43–77)
NRBC # BLD: 0 /100 WBCS — SIGNIFICANT CHANGE UP (ref 0–0)
PHOSPHATE SERPL-MCNC: 3.8 MG/DL — SIGNIFICANT CHANGE UP (ref 2.5–4.5)
PLATELET # BLD AUTO: 248 K/UL — SIGNIFICANT CHANGE UP (ref 150–400)
POTASSIUM SERPL-MCNC: 4.9 MMOL/L — SIGNIFICANT CHANGE UP (ref 3.5–5.3)
POTASSIUM SERPL-SCNC: 4.9 MMOL/L — SIGNIFICANT CHANGE UP (ref 3.5–5.3)
RBC # BLD: 3.5 M/UL — LOW (ref 3.8–5.2)
RBC # FLD: 14.7 % — HIGH (ref 10.3–14.5)
SARS-COV-2 IGG+IGM SERPL QL IA: 136 U/ML — HIGH
SARS-COV-2 IGG+IGM SERPL QL IA: POSITIVE
SODIUM SERPL-SCNC: 140 MMOL/L — SIGNIFICANT CHANGE UP (ref 135–145)
WBC # BLD: 5.63 K/UL — SIGNIFICANT CHANGE UP (ref 3.8–10.5)
WBC # FLD AUTO: 5.63 K/UL — SIGNIFICANT CHANGE UP (ref 3.8–10.5)

## 2021-08-19 RX ORDER — VANCOMYCIN HCL 1 G
1000 VIAL (EA) INTRAVENOUS EVERY 12 HOURS
Refills: 0 | Status: DISCONTINUED | OUTPATIENT
Start: 2021-08-19 | End: 2021-08-22

## 2021-08-19 RX ADMIN — AMLODIPINE BESYLATE 10 MILLIGRAM(S): 2.5 TABLET ORAL at 05:15

## 2021-08-19 RX ADMIN — Medication 250 MILLIGRAM(S): at 17:34

## 2021-08-19 RX ADMIN — CLOPIDOGREL BISULFATE 75 MILLIGRAM(S): 75 TABLET, FILM COATED ORAL at 12:11

## 2021-08-19 RX ADMIN — Medication 325 MILLIGRAM(S): at 22:03

## 2021-08-19 RX ADMIN — ATORVASTATIN CALCIUM 10 MILLIGRAM(S): 80 TABLET, FILM COATED ORAL at 22:03

## 2021-08-19 RX ADMIN — HEPARIN SODIUM 5000 UNIT(S): 5000 INJECTION INTRAVENOUS; SUBCUTANEOUS at 05:15

## 2021-08-19 RX ADMIN — ZOLPIDEM TARTRATE 5 MILLIGRAM(S): 10 TABLET ORAL at 22:03

## 2021-08-19 RX ADMIN — HEPARIN SODIUM 5000 UNIT(S): 5000 INJECTION INTRAVENOUS; SUBCUTANEOUS at 14:32

## 2021-08-19 RX ADMIN — GABAPENTIN 300 MILLIGRAM(S): 400 CAPSULE ORAL at 05:15

## 2021-08-19 RX ADMIN — Medication 500 MILLIGRAM(S): at 12:11

## 2021-08-19 RX ADMIN — PANTOPRAZOLE SODIUM 40 MILLIGRAM(S): 20 TABLET, DELAYED RELEASE ORAL at 05:15

## 2021-08-19 RX ADMIN — GABAPENTIN 300 MILLIGRAM(S): 400 CAPSULE ORAL at 17:34

## 2021-08-19 RX ADMIN — HEPARIN SODIUM 5000 UNIT(S): 5000 INJECTION INTRAVENOUS; SUBCUTANEOUS at 22:04

## 2021-08-19 RX ADMIN — Medication 75 MICROGRAM(S): at 05:43

## 2021-08-19 RX ADMIN — Medication 325 MILLIGRAM(S): at 14:31

## 2021-08-19 RX ADMIN — SERTRALINE 100 MILLIGRAM(S): 25 TABLET, FILM COATED ORAL at 12:11

## 2021-08-19 RX ADMIN — Medication 325 MILLIGRAM(S): at 05:15

## 2021-08-19 RX ADMIN — CARVEDILOL PHOSPHATE 25 MILLIGRAM(S): 80 CAPSULE, EXTENDED RELEASE ORAL at 17:34

## 2021-08-19 RX ADMIN — CARVEDILOL PHOSPHATE 25 MILLIGRAM(S): 80 CAPSULE, EXTENDED RELEASE ORAL at 05:15

## 2021-08-19 NOTE — CONSULT NOTE ADULT - ASSESSMENT
Patient is a 76y old  Female with PMH of CAD, HTN, HLD, and hypthyrodisim, sent in by her PCP for hyperkalemia. Patient reports she has had generalized weakness and some pressure-like left-sided chest pain, which all self-resolved. Patient was recently discharged from the hospital with bactrim for cellulitis of right foot. Patient denies fever, chills, n/v, SOB, palpitation. ON admission, she has no fever or Leukocytosis. She has started on  IV Vancomycin and the ID consult requested to assist with further evaluation and antibiotic management.    # Cellulitis  # Hyperkalemia- most likely due to Bactrim     would recommend:    1. Continue IV Vancomycin for now  2. Monitor and Keep Vancomycin level between 15 to 20    will follow the patient with you and make further recommendation based on the clinical course and Lab results  Thank you for the opportunity to participate in Ms. MOSES's care      Attending Attestation:    Spent more than 65 minutes on total encounter, more than 50 % of the visit was spent counseling and/or coordinating care by the Attending physician.         Patient is a 76y old  Female with PMH of CAD, HTN, HLD, and hypthyrodisim, sent in by her PCP for hyperkalemia. Patient reports she has had generalized weakness and some pressure-like left-sided chest pain, which all self-resolved. Patient was recently discharged from the hospital with bactrim for cellulitis of right foot. Patient denies fever, chills, n/v, SOB, palpitation. ON admission, she has no fever or Leukocytosis. She has started on  IV Vancomycin and the ID consult requested to assist with further evaluation and antibiotic management.    # Right great toe Cellulitis with abscess - woubd Cx grew MRSA with resistant to Doxycycline and sensitive to Bactrim and Vancomycin with ELIJAH 2  # Hyperkalemia- most likely due to Bactrim     would recommend:    1. Continue IV Vancomycin for now  X 5 days   2. Monitor and Keep Vancomycin level between 15 to 20  3. Pain management as needed        will follow the patient with you and make further recommendation based on the clinical course and Lab results  Thank you for the opportunity to participate in Ms. MOSES's care      Attending Attestation:    Spent more than 65 minutes on total encounter, more than 50 % of the visit was spent counseling and/or coordinating care by the Attending physician.

## 2021-08-19 NOTE — CONSULT NOTE ADULT - SUBJECTIVE AND OBJECTIVE BOX
Patient is a 76y old  Female with PMH of CAD, HTN, HLD, and hypthyrodisim, sent in by her PCP for hyperkalemia. Patient reports she has had generalized weakness and some pressure-like left-sided chest pain, which all self-resolved. Patient was recently discharged from the hospital with bactrim for cellulitis of right foot. Patient denies fever, chills, n/v, SOB, palpitation. ON admission, she has no fever or Leukocytosis. She has started on  IV Vancomycin and the ID consult requested to assist with further evaluation and antibiotic management.      REVIEW OF SYSTEMS: Total of twelve systems have been reviewed with patient and found to be negative unless mentioned in HPI        PAST MEDICAL & SURGICAL HISTORY:  History of hypertension  Hypothyroid  CAD (coronary artery disease)  Hyperlipidemia  Cholecystitis  s/p cholecystectomy  History of angioplasty, : mCx  H/O total knee replacement, right        SOCIAL HISTORY  Alcohol: Does not drink  Tobacco: Does not smoke  Illicit substance use: None      FAMILY HISTORY: Non contributory to the present illness      ALLERGIES: No Known Allergies        Vital Signs Last 24 Hrs  T(C): 36.7 (19 Aug 2021 16:53), Max: 36.9 (18 Aug 2021 20:01)  T(F): 98 (19 Aug 2021 16:53), Max: 98.4 (18 Aug 2021 20:01)  HR: 60 (19 Aug 2021 16:53) (51 - 62)  BP: 129/65 (19 Aug 2021 16:53) (103/43 - 142/52)  BP(mean): --  RR: 19 (19 Aug 2021 16:53) (17 - 20)  SpO2: 93% (19 Aug 2021 16:53) (93% - 97%)      PHYSICAL EXAM:  GENERAL: Not in distress   CHEST/LUNG: Not using accessory muscles   HEART: s1 and s2 present  ABDOMEN:  Nontender and  Nondistended  EXTREMITIES: No pedal  edema  CNS: Awake and Alert      LABS:                        10.4   5.63  )-----------( 248      ( 19 Aug 2021 08:41 )             34.2       08-    140  |  108  |  25<H>  ----------------------------<  105<H>  4.9   |  27  |  1.11    Ca    9.1      19 Aug 2021 08:41  Phos  3.8       Mg     2.0             CAPILLARY BLOOD GLUCOSE  POCT Blood Glucose.: 178 mg/dL (19 Aug 2021 17:15)        Urinalysis Basic - ( 18 Aug 2021 13:31 )  Color: Yellow / Appearance: Clear / S.015 / pH: x  Gluc: x / Ketone: Negative  / Bili: Negative / Urobili: Negative   Blood: x / Protein: Negative / Nitrite: Negative   Leuk Esterase: Trace / RBC: 0-2 /HPF / WBC 0-2 /HPF   Sq Epi: x / Non Sq Epi: Few /HPF / Bacteria: Negative /HPF        MEDICATIONS  (STANDING):  amLODIPine   Tablet 10 milliGRAM(s) Oral daily  ascorbic acid 500 milliGRAM(s) Oral daily  atorvastatin 10 milliGRAM(s) Oral at bedtime  carvedilol 25 milliGRAM(s) Oral every 12 hours  clopidogrel Tablet 75 milliGRAM(s) Oral daily  ferrous    sulfate 325 milliGRAM(s) Oral three times a day  gabapentin 300 milliGRAM(s) Oral two times a day  heparin   Injectable 5000 Unit(s) SubCutaneous every 8 hours  levothyroxine 75 MICROGram(s) Oral daily  pantoprazole    Tablet 40 milliGRAM(s) Oral before breakfast  sertraline 100 milliGRAM(s) Oral daily  sodium chloride 0.9%. 1000 milliLiter(s) (75 mL/Hr) IV Continuous <Continuous>  vancomycin  IVPB 1000 milliGRAM(s) IV Intermittent every 12 hours    MEDICATIONS  (PRN):  acetaminophen   Tablet .. 650 milliGRAM(s) Oral every 6 hours PRN Temp greater or equal to 38C (100.4F), Mild Pain (1 - 3)  zolpidem 5 milliGRAM(s) Oral at bedtime PRN Insomnia  zolpidem 5 milliGRAM(s) Oral at bedtime PRN Insomnia        RADIOLOGY & ADDITIONAL TESTS:    COVID-19 Bhargav Domain Antibody (21 @ 16:06)   COVID-19 Bhargav Domain Antibody Result: 136.00:     COVID-19 PCR . (21 @ 13:13)   COVID-19 PCR: NotDetec:                 Patient is a 76y old  Female with PMH of CAD, HTN, HLD, and hypthyrodisim, sent in by her PCP for hyperkalemia. Patient reports she has had generalized weakness and some pressure-like left-sided chest pain, which all self-resolved. Patient was recently discharged from the hospital with bactrim for cellulitis of right foot. Patient denies fever, chills, n/v, SOB, palpitation. ON admission, she has no fever or Leukocytosis. She has started on  IV Vancomycin and the ID consult requested to assist with further evaluation and antibiotic management.      REVIEW OF SYSTEMS: Total of twelve systems have been reviewed with patient and found to be negative unless mentioned in HPI      PAST MEDICAL & SURGICAL HISTORY:  History of hypertension  Hypothyroid  CAD (coronary artery disease)  Hyperlipidemia  Cholecystitis  s/p cholecystectomy  History of angioplasty, : mCx  H/O total knee replacement, right      SOCIAL HISTORY  Alcohol: Does not drink  Tobacco: Does not smoke  Illicit substance use: None      FAMILY HISTORY: Non contributory to the present illness      ALLERGIES: No Known Allergies        Vital Signs Last 24 Hrs  T(C): 36.7 (19 Aug 2021 16:53), Max: 36.9 (18 Aug 2021 20:01)  T(F): 98 (19 Aug 2021 16:53), Max: 98.4 (18 Aug 2021 20:01)  HR: 60 (19 Aug 2021 16:53) (51 - 62)  BP: 129/65 (19 Aug 2021 16:53) (103/43 - 142/52)  BP(mean): --  RR: 19 (19 Aug 2021 16:53) (17 - 20)  SpO2: 93% (19 Aug 2021 16:53) (93% - 97%)      PHYSICAL EXAM:  GENERAL: Not in distress   CHEST/LUNG: Not using accessory muscles   HEART: s1 and s2 present  ABDOMEN:  Nontender and  Nondistended  EXTREMITIES: Right great toe redness and pain improving   CNS: Awake and Alert      LABS:                        10.4   5.63  )-----------( 248      ( 19 Aug 2021 08:41 )             34.2       08-    140  |  108  |  25<H>  ----------------------------<  105<H>  4.9   |  27  |  1.11    Ca    9.1      19 Aug 2021 08:41  Phos  3.8     -  Mg     2.0     -        CAPILLARY BLOOD GLUCOSE  POCT Blood Glucose.: 178 mg/dL (19 Aug 2021 17:15)        Urinalysis Basic - ( 18 Aug 2021 13:31 )  Color: Yellow / Appearance: Clear / S.015 / pH: x  Gluc: x / Ketone: Negative  / Bili: Negative / Urobili: Negative   Blood: x / Protein: Negative / Nitrite: Negative   Leuk Esterase: Trace / RBC: 0-2 /HPF / WBC 0-2 /HPF   Sq Epi: x / Non Sq Epi: Few /HPF / Bacteria: Negative /HPF        MEDICATIONS  (STANDING):  amLODIPine   Tablet 10 milliGRAM(s) Oral daily  ascorbic acid 500 milliGRAM(s) Oral daily  atorvastatin 10 milliGRAM(s) Oral at bedtime  carvedilol 25 milliGRAM(s) Oral every 12 hours  clopidogrel Tablet 75 milliGRAM(s) Oral daily  ferrous    sulfate 325 milliGRAM(s) Oral three times a day  gabapentin 300 milliGRAM(s) Oral two times a day  heparin   Injectable 5000 Unit(s) SubCutaneous every 8 hours  levothyroxine 75 MICROGram(s) Oral daily  pantoprazole    Tablet 40 milliGRAM(s) Oral before breakfast  sertraline 100 milliGRAM(s) Oral daily  sodium chloride 0.9%. 1000 milliLiter(s) (75 mL/Hr) IV Continuous <Continuous>  vancomycin  IVPB 1000 milliGRAM(s) IV Intermittent every 12 hours    MEDICATIONS  (PRN):  acetaminophen   Tablet .. 650 milliGRAM(s) Oral every 6 hours PRN Temp greater or equal to 38C (100.4F), Mild Pain (1 - 3)  zolpidem 5 milliGRAM(s) Oral at bedtime PRN Insomnia  zolpidem 5 milliGRAM(s) Oral at bedtime PRN Insomnia        RADIOLOGY & ADDITIONAL TESTS:    COVID-19 Bhargav Domain Antibody (21 @ 16:06)   COVID-19 Bhargav Domain Antibody Result: 136.00:     COVID-19 PCR . (21 @ 13:13)   COVID-19 PCR: NotDetec:

## 2021-08-19 NOTE — PROGRESS NOTE ADULT - SUBJECTIVE AND OBJECTIVE BOX
Patient is a 76y old  Female who presents with a chief complaint of symptomatic hyperkalemia (18 Aug 2021 14:32)    pt seen in icu [  ], reg med floor [   ], bed [  ], chair at bedside [   ], a+o x3 [  ], lethargic [  ],  nad [  ]    pereira [  ], ngt [  ], peg [  ], et tube [  ], cent line [  ], picc line [  ]        Allergies    No Known Allergies        Vitals    T(F): 97.8 (08-19-21 @ 07:22), Max: 98.4 (08-18-21 @ 20:01)  HR: 53 (08-19-21 @ 07:22) (53 - 69)  BP: 103/43 (08-19-21 @ 07:22) (103/43 - 142/52)  RR: 17 (08-19-21 @ 07:22) (17 - 20)  SpO2: 97% (08-19-21 @ 07:22) (95% - 97%)  Wt(kg): --  CAPILLARY BLOOD GLUCOSE          Labs                          10.4   5.63  )-----------( 248      ( 19 Aug 2021 08:41 )             34.2       08-19    140  |  108  |  25<H>  ----------------------------<  105<H>  4.9   |  27  |  1.11    Ca    9.1      19 Aug 2021 08:41  Phos  3.8     08-19  Mg     2.0     08-19              Clean Catch Clean Catch (Midstream)  08-06 @ 03:31   <10,000 CFU/mL Normal Urogenital Kiersten  --  --      .Surgical Swab R great toe abscess  08-05 @ 22:11   Numerous Methicillin Resistant Staphylococcus aureus  --  Methicillin resistant Staphylococcus aureus      .Blood Blood-Peripheral  08-05 @ 18:31   No Growth Final  --  --          Radiology Results      Meds    MEDICATIONS  (STANDING):  amLODIPine   Tablet 10 milliGRAM(s) Oral daily  ascorbic acid 500 milliGRAM(s) Oral daily  atorvastatin 10 milliGRAM(s) Oral at bedtime  carvedilol 25 milliGRAM(s) Oral every 12 hours  clopidogrel Tablet 75 milliGRAM(s) Oral daily  ferrous    sulfate 325 milliGRAM(s) Oral three times a day  gabapentin 300 milliGRAM(s) Oral two times a day  heparin   Injectable 5000 Unit(s) SubCutaneous every 8 hours  levothyroxine 75 MICROGram(s) Oral daily  pantoprazole    Tablet 40 milliGRAM(s) Oral before breakfast  sertraline 100 milliGRAM(s) Oral daily  sodium chloride 0.9%. 1000 milliLiter(s) (75 mL/Hr) IV Continuous <Continuous>  vancomycin  IVPB 1000 milliGRAM(s) IV Intermittent every 12 hours      MEDICATIONS  (PRN):  acetaminophen   Tablet .. 650 milliGRAM(s) Oral every 6 hours PRN Temp greater or equal to 38C (100.4F), Mild Pain (1 - 3)  zolpidem 5 milliGRAM(s) Oral at bedtime PRN Insomnia  zolpidem 5 milliGRAM(s) Oral at bedtime PRN Insomnia      Physical Exam    Neuro :  no focal deficits  Respiratory: CTA B/L  CV: RRR, S1S2, no murmurs,   Abdominal: Soft, NT, ND +BS,  Extremities: No edema, + peripheral pulses    ASSESSMENT    Hyperkalemia    History of hypertension    Hypercholesterolemia    Myocardial infarction syndrome    Osteoarthritis    Hypothyroid    CAD (coronary artery disease)    Depression    Hyperlipidemia    Anemia    Cholecystitis    History of angioplasty    History of shoulder surgery    H/O total knee replacement, right        PLAN     Patient is a 76y old  Female who presents with a chief complaint of symptomatic hyperkalemia (18 Aug 2021 14:32)    pt seen in icu [  ], reg med floor [   ], bed [  ], chair at bedside [   ], a+o x3 [  ], lethargic [  ],  nad [  ]    pereira [  ], ngt [  ], peg [  ], et tube [  ], cent line [  ], picc line [  ]        Allergies    No Known Allergies        Vitals    T(F): 97.8 (08-19-21 @ 07:22), Max: 98.4 (08-18-21 @ 20:01)  HR: 53 (08-19-21 @ 07:22) (53 - 69)  BP: 103/43 (08-19-21 @ 07:22) (103/43 - 142/52)  RR: 17 (08-19-21 @ 07:22) (17 - 20)  SpO2: 97% (08-19-21 @ 07:22) (95% - 97%)  Wt(kg): --  CAPILLARY BLOOD GLUCOSE          Labs                          10.4   5.63  )-----------( 248      ( 19 Aug 2021 08:41 )             34.2       08-19    140  |  108  |  25<H>  ----------------------------<  105<H>  4.9   |  27  |  1.11    Ca    9.1      19 Aug 2021 08:41  Phos  3.8     08-19  Mg     2.0     08-19              Clean Catch Clean Catch (Midstream)  08-06 @ 03:31   <10,000 CFU/mL Normal Urogenital Kiersten  --  --      .Surgical Swab R great toe abscess  08-05 @ 22:11   Numerous Methicillin Resistant Staphylococcus aureus  --  Methicillin resistant Staphylococcus aureus      .Blood Blood-Peripheral  08-05 @ 18:31   No Growth Final  --  --          Radiology Results      Meds    MEDICATIONS  (STANDING):  amLODIPine   Tablet 10 milliGRAM(s) Oral daily  ascorbic acid 500 milliGRAM(s) Oral daily  atorvastatin 10 milliGRAM(s) Oral at bedtime  carvedilol 25 milliGRAM(s) Oral every 12 hours  clopidogrel Tablet 75 milliGRAM(s) Oral daily  ferrous    sulfate 325 milliGRAM(s) Oral three times a day  gabapentin 300 milliGRAM(s) Oral two times a day  heparin   Injectable 5000 Unit(s) SubCutaneous every 8 hours  levothyroxine 75 MICROGram(s) Oral daily  pantoprazole    Tablet 40 milliGRAM(s) Oral before breakfast  sertraline 100 milliGRAM(s) Oral daily  sodium chloride 0.9%. 1000 milliLiter(s) (75 mL/Hr) IV Continuous <Continuous>  vancomycin  IVPB 1000 milliGRAM(s) IV Intermittent every 12 hours      MEDICATIONS  (PRN):  acetaminophen   Tablet .. 650 milliGRAM(s) Oral every 6 hours PRN Temp greater or equal to 38C (100.4F), Mild Pain (1 - 3)  zolpidem 5 milliGRAM(s) Oral at bedtime PRN Insomnia  zolpidem 5 milliGRAM(s) Oral at bedtime PRN Insomnia      Physical Exam    Neuro :  no focal deficits  Respiratory: CTA B/L  CV: RRR, S1S2, no murmurs,   Abdominal: Soft, NT, ND +BS,  Extremities: No edema, + peripheral pulses    ASSESSMENT    Hyperkalemia    History of hypertension    Hypercholesterolemia    Myocardial infarction syndrome    Osteoarthritis    Hypothyroid    CAD (coronary artery disease)    Depression    Hyperlipidemia    Anemia    Cholecystitis    History of angioplasty    History of shoulder surgery    H/O total knee replacement, right        PLAN         Patient is a 76y old  Female who presents with a chief complaint of symptomatic hyperkalemia (18 Aug 2021 14:32)    pt seen in tele [ x ], reg med floor [ x  ], bed [ x ], chair at bedside [   ], a+o x3 [ x ], lethargic [  ],  nad [ x ]        Allergies    No Known Allergies        Vitals    T(F): 97.8 (08-19-21 @ 07:22), Max: 98.4 (08-18-21 @ 20:01)  HR: 53 (08-19-21 @ 07:22) (53 - 69)  BP: 103/43 (08-19-21 @ 07:22) (103/43 - 142/52)  RR: 17 (08-19-21 @ 07:22) (17 - 20)  SpO2: 97% (08-19-21 @ 07:22) (95% - 97%)  Wt(kg): --  CAPILLARY BLOOD GLUCOSE          Labs                          10.4   5.63  )-----------( 248      ( 19 Aug 2021 08:41 )             34.2       08-19    140  |  108  |  25<H>  ----------------------------<  105<H>  4.9   |  27  |  1.11    Ca    9.1      19 Aug 2021 08:41  Phos  3.8     08-19  Mg     2.0     08-19              Radiology Results      Meds    MEDICATIONS  (STANDING):  amLODIPine   Tablet 10 milliGRAM(s) Oral daily  ascorbic acid 500 milliGRAM(s) Oral daily  atorvastatin 10 milliGRAM(s) Oral at bedtime  carvedilol 25 milliGRAM(s) Oral every 12 hours  clopidogrel Tablet 75 milliGRAM(s) Oral daily  ferrous    sulfate 325 milliGRAM(s) Oral three times a day  gabapentin 300 milliGRAM(s) Oral two times a day  heparin   Injectable 5000 Unit(s) SubCutaneous every 8 hours  levothyroxine 75 MICROGram(s) Oral daily  pantoprazole    Tablet 40 milliGRAM(s) Oral before breakfast  sertraline 100 milliGRAM(s) Oral daily  sodium chloride 0.9%. 1000 milliLiter(s) (75 mL/Hr) IV Continuous <Continuous>  vancomycin  IVPB 1000 milliGRAM(s) IV Intermittent every 12 hours      MEDICATIONS  (PRN):  acetaminophen   Tablet .. 650 milliGRAM(s) Oral every 6 hours PRN Temp greater or equal to 38C (100.4F), Mild Pain (1 - 3)  zolpidem 5 milliGRAM(s) Oral at bedtime PRN Insomnia  zolpidem 5 milliGRAM(s) Oral at bedtime PRN Insomnia      Physical Exam    Neuro :  no focal deficits  Respiratory: CTA B/L  CV: RRR, S1S2, no murmurs,   Abdominal: Soft, NT, ND +BS,  Extremities: No edema, + peripheral pulses    ASSESSMENT    Hyperkalemia,   h/o CAD,   HTN,  HLD,   hypthyrodisim,   right foot abscess with mrsa on bactrim  Osteoarthritis  Depression  Anemia  angioplasty  shoulder surgery  total knee replacement, right        PLAN    d/c tele  s/p lokelma, calcium gluconate and insulin,   h/h wnl noted above   cont vanco 1g q12 x 1 more day days,   id cons  cont current meds  d/c plan for am after completing abx course

## 2021-08-19 NOTE — PROGRESS NOTE ADULT - PROBLEM SELECTOR PLAN 1
- sent in by PCP for hyperkalemia K was found to be 6.4.   - likely 2/2 bactrim  - EKG no changes  - Current K - 5.1  - c/w IVF, and cocktail as needed  - monitor BMP

## 2021-08-19 NOTE — ED ADULT NURSE REASSESSMENT NOTE - NS ED NURSE REASSESS COMMENT FT1
Pt with bed placement. Report given to WALLY Garcia. Pt transported to unit via stretcher with RN and alida at bedside. NSR on monitor. Pt resting comfortably A&Ox3 in NAD. Safety maintained. Updated on plan of care.

## 2021-08-19 NOTE — DISCHARGE NOTE PROVIDER - NSDCMRMEDTOKEN_GEN_ALL_CORE_FT
acetaminophen 325 mg oral tablet: 2 tab(s) orally every 6 hours, As needed Moderate Pain   ascorbic acid 500 mg oral tablet: 1 tab(s) orally 2 times a day  carvedilol 25 mg oral tablet: 1 tab(s) orally 2 times a day  ferrous sulfate 325 mg (65 mg elemental iron) oral tablet: 1 tab(s) orally 3 times a day  gabapentin 300 mg oral capsule: 1 cap(s) orally 2 times a day  irbesartan 150 mg oral tablet: 1 tab(s) orally once a day  levothyroxine 75 mcg (0.075 mg) oral tablet: 1 tab(s) orally once a day  Norvasc 10 mg oral tablet: 1 tab(s) orally once a day  omeprazole 40 mg oral delayed release capsule: 1 cap(s) orally once a day  Plavix 75 mg oral tablet: 1 tab(s) orally once a day  Restasis 0.05% ophthalmic emulsion: 1 drop(s) to each affected eye every 12 hours  sertraline 100 mg oral tablet: 1 tab(s) orally once a day  zolpidem 10 mg oral tablet: 1 tab(s) orally once a day (at bedtime), As Needed

## 2021-08-19 NOTE — PROGRESS NOTE ADULT - SUBJECTIVE AND OBJECTIVE BOX
PGY-1 Progress Note discussed with attending    PAGER #: [[phone]] TILL 5:00 PM  PLEASE CONTACT ON CALL TEAM:  - On Call Team (Please refer to Renee) FROM 5:00 PM - 8:30PM  - Nightfloat Team FROM 8:30 -7:30 AM    CHIEF COMPLAINT & BRIEF HOSPITAL COURSE:  HPI:  Patient is a 76yoF, w/ PMH of CAD, HTN, HLD, and hypthyrodisim, sent in by her PCP for hyperkalemia. Patient reports she has had generalized weakness and some and pressure-like left-sided chest pain, which all self-resolved. Patient was recently discharged from the hospital with bactrim for cellulitis of right foot. Patient denies fever, chills, n/v, SOB, palpitation, abdominal pain, urinary or bowel movement changes.  (18 Aug 2021 14:32)      INTERVAL HPI/OVERNIGHT EVENTS:   Patient was examined while she was lying in bed, Aox3, responding appropriately to questions, in NAD. She has normal bowel and bladder movements, and denies any other acute complaints. Pt denied any chest pain, shortness of breath, nausea, vomiting or abdominal pain.     REVIEW OF SYSTEMS:  CONSTITUTIONAL: No fever, weight loss, or fatigue  RESPIRATORY: No cough, wheezing, chills or hemoptysis; No shortness of breath  CARDIOVASCULAR: No chest pain, palpitations, dizziness, or leg swelling  GASTROINTESTINAL: No abdominal pain. No nausea, vomiting, or hematemesis; No diarrhea or constipation. No melena or hematochezia.  GENITOURINARY: No dysuria or hematuria, urinary frequency  NEUROLOGICAL: No headaches, memory loss, loss of strength, numbness, or tremors  SKIN: No itching, burning, rashes, or lesions     Vital Signs Last 24 Hrs  T(C): 36.5 (19 Aug 2021 11:02), Max: 36.9 (18 Aug 2021 20:01)  T(F): 97.7 (19 Aug 2021 11:02), Max: 98.4 (18 Aug 2021 20:01)  HR: 51 (19 Aug 2021 11:02) (51 - 69)  BP: 136/51 (19 Aug 2021 11:02) (103/43 - 142/52)  BP(mean): --  RR: 18 (19 Aug 2021 11:02) (17 - 20)  SpO2: 94% (19 Aug 2021 11:02) (94% - 97%)    PHYSICAL EXAMINATION:  GENERAL: NAD, well built  HEAD:  Atraumatic, Normocephalic  EYES:  conjunctiva and sclera clear  NECK: Supple, No JVD, Normal thyroid  CHEST/LUNG: Clear to auscultation. Clear to percussion bilaterally; No rales, rhonchi, wheezing, or rubs  HEART: Regular rate and rhythm; No murmurs, rubs, or gallops  ABDOMEN: Soft, Nontender, Nondistended; Bowel sounds present  NERVOUS SYSTEM:  Alert & Oriented X3,    EXTREMITIES:  2+ Peripheral Pulses, No clubbing, cyanosis, or edema  SKIN: warm dry                          10.4   5.63  )-----------( 248      ( 19 Aug 2021 08:41 )             34.2     08-19    140  |  108  |  25<H>  ----------------------------<  105<H>  4.9   |  27  |  1.11    Ca    9.1      19 Aug 2021 08:41  Phos  3.8     08-19  Mg     2.0     08-19

## 2021-08-19 NOTE — DISCHARGE NOTE PROVIDER - NSDCCPCAREPLAN_GEN_ALL_CORE_FT
PRINCIPAL DISCHARGE DIAGNOSIS  Diagnosis: Hyperkalemia  Assessment and Plan of Treatment: You came to the hospital after being found to have high potassium in your blood test by your doctor. When you came to the hospital you were on an antibiotic called bactrim which could have caused this. We took you off that antibiotic and gave you medications to help bring the potassium back down to normal. Your potassium came back to normal the next day. You had an EKG done which was normal as well. Please follow up with your primary care doctor within 2 weeks of discharge.        SECONDARY DISCHARGE DIAGNOSES  Diagnosis: FRANCESCA (acute kidney injury)  Assessment and Plan of Treatment: You came into the hospital with high kidney numbers which showed slight damge to the kidney. You were given fluids and your antibiotic was stopped which improved your kidney function. Please follow up with your primary care doctor within 2 weeks of discharge.      Diagnosis: CAD (coronary artery disease)  Assessment and Plan of Treatment: This is something you came into the hospital    Diagnosis: Hypertension  Assessment and Plan of Treatment: You have high blood pressure. Please continue to taking your medications as prescribed. Please measure your blood pressure at least once daily. Maintain a healthy diet which includes incorporating more vegetables and decreasing the amount of salt (low sodium) and sugar in your diet such as rice, bread, and pasta low sugar, low fat, low sodium diet. Exercise frequently if possible.  Please follow up with your primary care provider within one week of your discharge.      Diagnosis: Hypothyroidism  Assessment and Plan of Treatment:     Diagnosis: Diabetic toe ulcer  Assessment and Plan of Treatment:      PRINCIPAL DISCHARGE DIAGNOSIS  Diagnosis: Hyperkalemia  Assessment and Plan of Treatment: You came to the hospital after being found to have high potassium in your blood test by your doctor. When you came to the hospital you were on an antibiotic called bactrim which could have caused this. We took you off that antibiotic and gave you medications to help bring the potassium back down to normal. Your potassium came back to normal the next day. You had an EKG done which was normal as well. Please follow up with your primary care doctor within 2 weeks of discharge.        SECONDARY DISCHARGE DIAGNOSES  Diagnosis: FRANCESCA (acute kidney injury)  Assessment and Plan of Treatment: You came into the hospital with high kidney numbers which showed slight damge to the kidney. You were given fluids and your antibiotic was stopped which improved your kidney function. Please follow up with your primary care doctor within 2 weeks of discharge.      Diagnosis: CAD (coronary artery disease)  Assessment and Plan of Treatment: This is something you came into the hospital with we continued your home medication of plavix and started you atorvastatin 10mg. Please  continue to take your medication and follow up with your primary care doctor within 2 weeks of discharge.      Diagnosis: Hypertension  Assessment and Plan of Treatment: You have high blood pressure. Please continue to taking your medications as prescribed. Please measure your blood pressure at least once daily. Maintain a healthy diet which includes incorporating more vegetables and decreasing the amount of salt (low sodium) and sugar in your diet such as rice, bread, and pasta low sugar, low fat, low sodium diet. Exercise frequently if possible.  Please follow up with your primary care provider within one week of your discharge.      Diagnosis: Hypothyroidism  Assessment and Plan of Treatment: This is a condition you came into the hospital with. We continued your home medication of Synthroid. Please continue to take your medications and follow up with your primary care doctor within 2 weeks of discharge.      Diagnosis: Diabetic toe ulcer  Assessment and Plan of Treatment: You were previously hospitalized with this and were taking medication for it. In the hospital we gave you antibiotics through the IV and you completed the course. Please follow up with your primary care doctor within 2 weeks of discharge.       PRINCIPAL DISCHARGE DIAGNOSIS  Diagnosis: Hyperkalemia  Assessment and Plan of Treatment: You came to the hospital after being found to have high potassium in your blood test by your doctor. When you came to the hospital you were on an antibiotic called bactrim which could have caused this. We took you off that antibiotic and gave you medications to help bring the potassium back down to normal. Your potassium came back to normal the next day. You had an EKG done which was normal as well. Please follow up with your primary care doctor within 2 weeks of discharge.        SECONDARY DISCHARGE DIAGNOSES  Diagnosis: Diabetic toe ulcer  Assessment and Plan of Treatment: You were previously hospitalized with this and were taking medication for it. In the hospital we gave you antibiotics through the IV and you completed the course. Please follow up with your primary care doctor within 2 weeks of discharge.      Diagnosis: FRANCESCA (acute kidney injury)  Assessment and Plan of Treatment: You came into the hospital with high kidney numbers which showed slight damge to the kidney. You were given fluids and your antibiotic was stopped which improved your kidney function. Please follow up with your primary care doctor within 2 weeks of discharge.      Diagnosis: CAD (coronary artery disease)  Assessment and Plan of Treatment: This is something you came into the hospital with we continued your home medication of plavix and started you atorvastatin 10mg. Please  continue to take your medication and follow up with your primary care doctor within 2 weeks of discharge.      Diagnosis: Hypertension  Assessment and Plan of Treatment: You have high blood pressure. Please continue to taking your medications as prescribed. Please measure your blood pressure at least once daily. Maintain a healthy diet which includes incorporating more vegetables and decreasing the amount of salt (low sodium) and sugar in your diet such as rice, bread, and pasta low sugar, low fat, low sodium diet. Exercise frequently if possible.  Please follow up with your primary care provider within one week of your discharge.      Diagnosis: Hypothyroidism  Assessment and Plan of Treatment: This is a condition you came into the hospital with. We continued your home medication of Synthroid. Please continue to take your medications and follow up with your primary care doctor within 2 weeks of discharge.

## 2021-08-19 NOTE — DISCHARGE NOTE PROVIDER - CARE PROVIDER_API CALL
JOHN PACHECO  Internal Medicine  [address] [place] [id number]  Phone: [phone]  Fax: [phone]  Follow Up Time:

## 2021-08-20 LAB
ANION GAP SERPL CALC-SCNC: 7 MMOL/L — SIGNIFICANT CHANGE UP (ref 5–17)
BASOPHILS # BLD AUTO: 0.04 K/UL — SIGNIFICANT CHANGE UP (ref 0–0.2)
BASOPHILS NFR BLD AUTO: 0.7 % — SIGNIFICANT CHANGE UP (ref 0–2)
BUN SERPL-MCNC: 22 MG/DL — HIGH (ref 7–18)
CALCIUM SERPL-MCNC: 8.9 MG/DL — SIGNIFICANT CHANGE UP (ref 8.4–10.5)
CHLORIDE SERPL-SCNC: 106 MMOL/L — SIGNIFICANT CHANGE UP (ref 96–108)
CO2 SERPL-SCNC: 26 MMOL/L — SIGNIFICANT CHANGE UP (ref 22–31)
CREAT SERPL-MCNC: 0.88 MG/DL — SIGNIFICANT CHANGE UP (ref 0.5–1.3)
EOSINOPHIL # BLD AUTO: 0.43 K/UL — SIGNIFICANT CHANGE UP (ref 0–0.5)
EOSINOPHIL NFR BLD AUTO: 7.7 % — HIGH (ref 0–6)
GLUCOSE BLDC GLUCOMTR-MCNC: 111 MG/DL — HIGH (ref 70–99)
GLUCOSE BLDC GLUCOMTR-MCNC: 89 MG/DL — SIGNIFICANT CHANGE UP (ref 70–99)
GLUCOSE SERPL-MCNC: 114 MG/DL — HIGH (ref 70–99)
HCT VFR BLD CALC: 32.8 % — LOW (ref 34.5–45)
HGB BLD-MCNC: 10.3 G/DL — LOW (ref 11.5–15.5)
IMM GRANULOCYTES NFR BLD AUTO: 0.5 % — SIGNIFICANT CHANGE UP (ref 0–1.5)
LYMPHOCYTES # BLD AUTO: 1.51 K/UL — SIGNIFICANT CHANGE UP (ref 1–3.3)
LYMPHOCYTES # BLD AUTO: 27.2 % — SIGNIFICANT CHANGE UP (ref 13–44)
MAGNESIUM SERPL-MCNC: 1.8 MG/DL — SIGNIFICANT CHANGE UP (ref 1.6–2.6)
MCHC RBC-ENTMCNC: 29.9 PG — SIGNIFICANT CHANGE UP (ref 27–34)
MCHC RBC-ENTMCNC: 31.4 GM/DL — LOW (ref 32–36)
MCV RBC AUTO: 95.1 FL — SIGNIFICANT CHANGE UP (ref 80–100)
MONOCYTES # BLD AUTO: 0.56 K/UL — SIGNIFICANT CHANGE UP (ref 0–0.9)
MONOCYTES NFR BLD AUTO: 10.1 % — SIGNIFICANT CHANGE UP (ref 2–14)
NEUTROPHILS # BLD AUTO: 2.98 K/UL — SIGNIFICANT CHANGE UP (ref 1.8–7.4)
NEUTROPHILS NFR BLD AUTO: 53.8 % — SIGNIFICANT CHANGE UP (ref 43–77)
NRBC # BLD: 0 /100 WBCS — SIGNIFICANT CHANGE UP (ref 0–0)
PHOSPHATE SERPL-MCNC: 3.5 MG/DL — SIGNIFICANT CHANGE UP (ref 2.5–4.5)
PLATELET # BLD AUTO: 245 K/UL — SIGNIFICANT CHANGE UP (ref 150–400)
POTASSIUM SERPL-MCNC: 4.6 MMOL/L — SIGNIFICANT CHANGE UP (ref 3.5–5.3)
POTASSIUM SERPL-SCNC: 4.6 MMOL/L — SIGNIFICANT CHANGE UP (ref 3.5–5.3)
RBC # BLD: 3.45 M/UL — LOW (ref 3.8–5.2)
RBC # FLD: 14.2 % — SIGNIFICANT CHANGE UP (ref 10.3–14.5)
SODIUM SERPL-SCNC: 139 MMOL/L — SIGNIFICANT CHANGE UP (ref 135–145)
VANCOMYCIN TROUGH SERPL-MCNC: 17 UG/ML — SIGNIFICANT CHANGE UP (ref 10–20)
WBC # BLD: 5.55 K/UL — SIGNIFICANT CHANGE UP (ref 3.8–10.5)
WBC # FLD AUTO: 5.55 K/UL — SIGNIFICANT CHANGE UP (ref 3.8–10.5)

## 2021-08-20 RX ADMIN — HEPARIN SODIUM 5000 UNIT(S): 5000 INJECTION INTRAVENOUS; SUBCUTANEOUS at 06:15

## 2021-08-20 RX ADMIN — HEPARIN SODIUM 5000 UNIT(S): 5000 INJECTION INTRAVENOUS; SUBCUTANEOUS at 21:07

## 2021-08-20 RX ADMIN — PANTOPRAZOLE SODIUM 40 MILLIGRAM(S): 20 TABLET, DELAYED RELEASE ORAL at 06:15

## 2021-08-20 RX ADMIN — Medication 75 MICROGRAM(S): at 06:14

## 2021-08-20 RX ADMIN — Medication 500 MILLIGRAM(S): at 12:46

## 2021-08-20 RX ADMIN — HEPARIN SODIUM 5000 UNIT(S): 5000 INJECTION INTRAVENOUS; SUBCUTANEOUS at 13:03

## 2021-08-20 RX ADMIN — SERTRALINE 100 MILLIGRAM(S): 25 TABLET, FILM COATED ORAL at 12:46

## 2021-08-20 RX ADMIN — Medication 250 MILLIGRAM(S): at 17:27

## 2021-08-20 RX ADMIN — GABAPENTIN 300 MILLIGRAM(S): 400 CAPSULE ORAL at 06:15

## 2021-08-20 RX ADMIN — Medication 325 MILLIGRAM(S): at 06:14

## 2021-08-20 RX ADMIN — GABAPENTIN 300 MILLIGRAM(S): 400 CAPSULE ORAL at 17:29

## 2021-08-20 RX ADMIN — CARVEDILOL PHOSPHATE 25 MILLIGRAM(S): 80 CAPSULE, EXTENDED RELEASE ORAL at 18:03

## 2021-08-20 RX ADMIN — Medication 325 MILLIGRAM(S): at 21:07

## 2021-08-20 RX ADMIN — ATORVASTATIN CALCIUM 10 MILLIGRAM(S): 80 TABLET, FILM COATED ORAL at 22:50

## 2021-08-20 RX ADMIN — Medication 325 MILLIGRAM(S): at 13:03

## 2021-08-20 RX ADMIN — AMLODIPINE BESYLATE 10 MILLIGRAM(S): 2.5 TABLET ORAL at 06:14

## 2021-08-20 RX ADMIN — CLOPIDOGREL BISULFATE 75 MILLIGRAM(S): 75 TABLET, FILM COATED ORAL at 12:46

## 2021-08-20 RX ADMIN — Medication 250 MILLIGRAM(S): at 06:24

## 2021-08-20 RX ADMIN — ZOLPIDEM TARTRATE 5 MILLIGRAM(S): 10 TABLET ORAL at 22:47

## 2021-08-20 RX ADMIN — CARVEDILOL PHOSPHATE 25 MILLIGRAM(S): 80 CAPSULE, EXTENDED RELEASE ORAL at 06:14

## 2021-08-20 NOTE — PROGRESS NOTE ADULT - SUBJECTIVE AND OBJECTIVE BOX
Patient is a 76y old  Female who presents with a chief complaint of symptomatic hyperkalemia (19 Aug 2021 18:23)    pt seen in tele [ x ], reg med floor [ x  ], bed [ x ], chair at bedside [   ], a+o x3 [ x ], lethargic [  ],    nad [ x ]        Allergies    No Known Allergies        Vitals    T(F): 98.4 (08-20-21 @ 04:39), Max: 98.6 (08-19-21 @ 23:44)  HR: 60 (08-20-21 @ 04:39) (51 - 60)  BP: 129/67 (08-20-21 @ 04:39) (103/43 - 140/68)  RR: 18 (08-20-21 @ 04:39) (17 - 19)  SpO2: 95% (08-20-21 @ 04:39) (93% - 97%)  Wt(kg): --  CAPILLARY BLOOD GLUCOSE      POCT Blood Glucose.: 151 mg/dL (19 Aug 2021 21:28)      Labs                          10.4   5.63  )-----------( 248      ( 19 Aug 2021 08:41 )             34.2       08-19    140  |  108  |  25<H>  ----------------------------<  105<H>  4.9   |  27  |  1.11    Ca    9.1      19 Aug 2021 08:41  Phos  3.8     08-19  Mg     2.0     08-19              Clean Catch Clean Catch (Midstream)  08-06 @ 03:31   <10,000 CFU/mL Normal Urogenital Kiersten  --  --      .Surgical Swab R great toe abscess  08-05 @ 22:11   Numerous Methicillin Resistant Staphylococcus aureus  --  Methicillin resistant Staphylococcus aureus      .Blood Blood-Peripheral  08-05 @ 18:31   No Growth Final  --  --          Radiology Results      Meds    MEDICATIONS  (STANDING):  amLODIPine   Tablet 10 milliGRAM(s) Oral daily  ascorbic acid 500 milliGRAM(s) Oral daily  atorvastatin 10 milliGRAM(s) Oral at bedtime  carvedilol 25 milliGRAM(s) Oral every 12 hours  clopidogrel Tablet 75 milliGRAM(s) Oral daily  ferrous    sulfate 325 milliGRAM(s) Oral three times a day  gabapentin 300 milliGRAM(s) Oral two times a day  heparin   Injectable 5000 Unit(s) SubCutaneous every 8 hours  levothyroxine 75 MICROGram(s) Oral daily  pantoprazole    Tablet 40 milliGRAM(s) Oral before breakfast  sertraline 100 milliGRAM(s) Oral daily  sodium chloride 0.9%. 1000 milliLiter(s) (75 mL/Hr) IV Continuous <Continuous>  vancomycin  IVPB 1000 milliGRAM(s) IV Intermittent every 12 hours      MEDICATIONS  (PRN):  acetaminophen   Tablet .. 650 milliGRAM(s) Oral every 6 hours PRN Temp greater or equal to 38C (100.4F), Mild Pain (1 - 3)  zolpidem 5 milliGRAM(s) Oral at bedtime PRN Insomnia  zolpidem 5 milliGRAM(s) Oral at bedtime PRN Insomnia      Physical Exam    Neuro :  no focal deficits  Respiratory: CTA B/L  CV: RRR, S1S2, no murmurs,   Abdominal: Soft, NT, ND +BS,  Extremities: No edema, + peripheral pulses    ASSESSMENT    Hyperkalemia,   h/o CAD,   HTN,  HLD,   hypthyrodisim,   right foot abscess with mrsa on bactrim  Osteoarthritis  Depression  Anemia  angioplasty  shoulder surgery  total knee replacement, right        PLAN    d/c tele  s/p lokelma, calcium gluconate and insulin,   h/h wnl noted above   cont vanco 1g q12 x 1 more day days,   id cons  cont current meds  d/c plan for am after completing abx course         Patient is a 76y old  Female who presents with a chief complaint of symptomatic hyperkalemia (19 Aug 2021 18:23)    pt seen in tele [ x ], reg med floor [ x  ], bed [ x ], chair at bedside [   ], a+o x3 [ x ], lethargic [  ],    nad [ x ]        Allergies    No Known Allergies        Vitals    T(F): 98.4 (08-20-21 @ 04:39), Max: 98.6 (08-19-21 @ 23:44)  HR: 60 (08-20-21 @ 04:39) (51 - 60)  BP: 129/67 (08-20-21 @ 04:39) (103/43 - 140/68)  RR: 18 (08-20-21 @ 04:39) (17 - 19)  SpO2: 95% (08-20-21 @ 04:39) (93% - 97%)  Wt(kg): --  CAPILLARY BLOOD GLUCOSE      POCT Blood Glucose.: 151 mg/dL (19 Aug 2021 21:28)      Labs                          10.4   5.63  )-----------( 248      ( 19 Aug 2021 08:41 )             34.2       08-19    140  |  108  |  25<H>  ----------------------------<  105<H>  4.9   |  27  |  1.11    Ca    9.1      19 Aug 2021 08:41  Phos  3.8     08-19  Mg     2.0     08-19            Radiology Results      Meds    MEDICATIONS  (STANDING):  amLODIPine   Tablet 10 milliGRAM(s) Oral daily  ascorbic acid 500 milliGRAM(s) Oral daily  atorvastatin 10 milliGRAM(s) Oral at bedtime  carvedilol 25 milliGRAM(s) Oral every 12 hours  clopidogrel Tablet 75 milliGRAM(s) Oral daily  ferrous    sulfate 325 milliGRAM(s) Oral three times a day  gabapentin 300 milliGRAM(s) Oral two times a day  heparin   Injectable 5000 Unit(s) SubCutaneous every 8 hours  levothyroxine 75 MICROGram(s) Oral daily  pantoprazole    Tablet 40 milliGRAM(s) Oral before breakfast  sertraline 100 milliGRAM(s) Oral daily  sodium chloride 0.9%. 1000 milliLiter(s) (75 mL/Hr) IV Continuous <Continuous>  vancomycin  IVPB 1000 milliGRAM(s) IV Intermittent every 12 hours      MEDICATIONS  (PRN):  acetaminophen   Tablet .. 650 milliGRAM(s) Oral every 6 hours PRN Temp greater or equal to 38C (100.4F), Mild Pain (1 - 3)  zolpidem 5 milliGRAM(s) Oral at bedtime PRN Insomnia  zolpidem 5 milliGRAM(s) Oral at bedtime PRN Insomnia      Physical Exam    Neuro :  no focal deficits  Respiratory: CTA B/L  CV: RRR, S1S2, no murmurs,   Abdominal: Soft, NT, ND +BS,  Extremities: No edema, + peripheral pulses    ASSESSMENT    Hyperkalemia,   h/o CAD,   HTN,  HLD,   hypthyrodisim,   right foot abscess with mrsa on bactrim  Osteoarthritis  Depression  Anemia  angioplasty  shoulder surgery  total knee replacement, right        PLAN    d/c tele  s/p lokelma, calcium gluconate and insulin,   h/h wnl noted above   cont vanco 1g q12 x 3 more days,   id  f/u   cont current meds  d/c plan after completing abx course

## 2021-08-20 NOTE — PROGRESS NOTE ADULT - ASSESSMENT
Patient is a 76y old  Female with PMH of CAD, HTN, HLD, and hypthyrodisim, sent in by her PCP for hyperkalemia. Patient reports she has had generalized weakness and some pressure-like left-sided chest pain, which all self-resolved. Patient was recently discharged from the hospital with bactrim for cellulitis of right foot. Patient denies fever, chills, n/v, SOB, palpitation. ON admission, she has no fever or Leukocytosis. She has started on  IV Vancomycin and the ID consult requested to assist with further evaluation and antibiotic management.    # Right great toe Cellulitis with abscess - woubd Cx grew MRSA with resistant to Doxycycline and sensitive to Bactrim and Vancomycin with ELIJAH 2  # Hyperkalemia- most likely due to Bactrim     would recommend:    1. Follow up Vancomycin level and adjust Abx doses accordingly to keep level between 15 to 20  2. Continue IV Vancomycin for now  X 5 days   3. Pain management as needed        Attending Attestation:    Spent more than 45 minutes on total encounter, more than 50 % of the visit was spent counseling and/or coordinating care by the Attending physician.

## 2021-08-20 NOTE — PROGRESS NOTE ADULT - SUBJECTIVE AND OBJECTIVE BOX
PGY-1 Progress Note discussed with attending    PAGER #: [phone]] TILL 5:00 PM  PLEASE CONTACT ON CALL TEAM:  - On Call Team (Please refer to Renee) FROM 5:00 PM - 8:30PM  - Nightfloat Team FROM 8:30 -7:30 AM    INTERVAL HPI  - Patient is a 76yoF, w/ PMH of CAD, HTN, HLD, and hypthyrodisim, sent in by her PCP for hyperkalemia. Patient reports she has had generalized weakness and some and pressure-like left-sided chest pain, which all self-resolved. Patient was recently discharged from the hospital with bactrim for cellulitis of right foot. Patient denies fever, chills, n/v, SOB, palpitation, abdominal pain, urinary or bowel movement changes.     OVERNIGHT EVENTS:   - Patient is AAOx3. States she feels better today. No new complaints.     REVIEW OF SYSTEMS:  CONSTITUTIONAL: No fever, weight loss, or fatigue  RESPIRATORY: No cough, wheezing, chills or hemoptysis; No shortness of breath  CARDIOVASCULAR: No chest pain, palpitations, dizziness, or leg swelling  GASTROINTESTINAL: No abdominal pain. No nausea, vomiting, or hematemesis; No diarrhea or constipation. No melena or hematochezia.  GENITOURINARY: No dysuria or hematuria, urinary frequency  NEUROLOGICAL: Pos numbness and tingling in hands (chronic) on gabapentin. No headaches, memory loss, loss of strength, numbness, or tremors  SKIN: No itching, burning, rashes, or lesions     MEDICATIONS  (STANDING):  amLODIPine   Tablet 10 milliGRAM(s) Oral daily  ascorbic acid 500 milliGRAM(s) Oral daily  atorvastatin 10 milliGRAM(s) Oral at bedtime  carvedilol 25 milliGRAM(s) Oral every 12 hours  clopidogrel Tablet 75 milliGRAM(s) Oral daily  ferrous    sulfate 325 milliGRAM(s) Oral three times a day  gabapentin 300 milliGRAM(s) Oral two times a day  heparin   Injectable 5000 Unit(s) SubCutaneous every 8 hours  levothyroxine 75 MICROGram(s) Oral daily  pantoprazole    Tablet 40 milliGRAM(s) Oral before breakfast  sertraline 100 milliGRAM(s) Oral daily  sodium chloride 0.9%. 1000 milliLiter(s) (75 mL/Hr) IV Continuous <Continuous>  vancomycin  IVPB 1000 milliGRAM(s) IV Intermittent every 12 hours    MEDICATIONS  (PRN):  acetaminophen   Tablet .. 650 milliGRAM(s) Oral every 6 hours PRN Temp greater or equal to 38C (100.4F), Mild Pain (1 - 3)  zolpidem 5 milliGRAM(s) Oral at bedtime PRN Insomnia  zolpidem 5 milliGRAM(s) Oral at bedtime PRN Insomnia      Vital Signs Last 24 Hrs  T(C): 36.2 (20 Aug 2021 11:07), Max: 37 (19 Aug 2021 23:44)  T(F): 97.2 (20 Aug 2021 11:07), Max: 98.6 (19 Aug 2021 23:44)  HR: 67 (20 Aug 2021 11:07) (59 - 67)  BP: 140/56 (20 Aug 2021 11:07) (129/65 - 143/76)  BP(mean): --  RR: 18 (20 Aug 2021 11:07) (18 - 19)  SpO2: 96% (20 Aug 2021 11:07) (93% - 96%)    PHYSICAL EXAMINATION:  GENERAL: NAD, AAOx3  HEAD: AT/NC  EYES: conjunctiva and sclera clear  NECK: supple, No JVD noted, Normal thyroid  CHEST/LUNG: CTABL; no rales, rhonchi, wheezing, or rubs  HEART: regular rate and rhythm; no murmurs, rubs, or gallops  ABDOMEN: soft, nontender, nondistended; Bowel sounds present  EXTREMITIES:  Pos L toe ulcer wound, skin peeling.  +1 edema b/l LE. 2+ Peripheral Pulses, No clubbing, cyanosis,   SKIN: warm dry                          10.3   5.55  )-----------( 245      ( 20 Aug 2021 07:13 )             32.8     08-20    139  |  106  |  22<H>  ----------------------------<  114<H>  4.6   |  26  |  0.88    Ca    8.9      20 Aug 2021 07:13  Phos  3.5     08-20  Mg     1.8     08-20              COVID-19 PCR: NotDetec (18 Aug 2021 13:13)  COVID-19 PCR: NotDetec (05 Aug 2021 14:05)      CAPILLARY BLOOD GLUCOSE      POCT Blood Glucose.: 151 mg/dL (19 Aug 2021 21:28)  POCT Blood Glucose.: 178 mg/dL (19 Aug 2021 17:15)      RADIOLOGY & ADDITIONAL TESTS:

## 2021-08-20 NOTE — PROGRESS NOTE ADULT - SUBJECTIVE AND OBJECTIVE BOX
Patient is seen and examined at the bed side, is afebrile.      REVIEW OF SYSTEMS: All other review systems are negative      ALLERGIES: No Known Allergies        Vital Signs Last 24 Hrs  T(C): 36.7 (20 Aug 2021 16:05), Max: 37 (19 Aug 2021 23:44)  T(F): 98.1 (20 Aug 2021 16:05), Max: 98.6 (19 Aug 2021 23:44)  HR: 50 (20 Aug 2021 16:05) (50 - 67)  BP: 141/58 (20 Aug 2021 16:05) (129/67 - 143/76)  BP(mean): --  RR: 18 (20 Aug 2021 16:05) (18 - 18)  SpO2: 94% (20 Aug 2021 16:05) (94% - 96%)      PHYSICAL EXAM:  GENERAL: Not in distress   CHEST/LUNG: Not using accessory muscles   HEART: s1 and s2 present  ABDOMEN:  Nontender and  Nondistended  EXTREMITIES: Right great toe redness and pain improving   CNS: Awake and Alert      LABS:                        10.3   5.55  )-----------( 245      ( 20 Aug 2021 07:13 )             32.8                           10.4   5.63  )-----------( 248      ( 19 Aug 2021 08:41 )             34.2       08-20    139  |  106  |  22<H>  ----------------------------<  114<H>  4.6   |  26  |  0.88    Ca    8.9      20 Aug 2021 07:13  Phos  3.5     08-20  Mg     1.8     08-20      08-19    140  |  108  |  25<H>  ----------------------------<  105<H>  4.9   |  27  |  1.11    Ca    9.1      19 Aug 2021 08:41  Phos  3.8     08-19  Mg     2.0     08-19        CAPILLARY BLOOD GLUCOSE  POCT Blood Glucose.: 178 mg/dL (19 Aug 2021 17:15)        Urinalysis Basic - ( 18 Aug 2021 13:31 )  Color: Yellow / Appearance: Clear / S.015 / pH: x  Gluc: x / Ketone: Negative  / Bili: Negative / Urobili: Negative   Blood: x / Protein: Negative / Nitrite: Negative   Leuk Esterase: Trace / RBC: 0-2 /HPF / WBC 0-2 /HPF   Sq Epi: x / Non Sq Epi: Few /HPF / Bacteria: Negative /HPF          MEDICATIONS  (STANDING):    amLODIPine   Tablet 10 milliGRAM(s) Oral daily  ascorbic acid 500 milliGRAM(s) Oral daily  atorvastatin 10 milliGRAM(s) Oral at bedtime  carvedilol 25 milliGRAM(s) Oral every 12 hours  clopidogrel Tablet 75 milliGRAM(s) Oral daily  ferrous    sulfate 325 milliGRAM(s) Oral three times a day  gabapentin 300 milliGRAM(s) Oral two times a day  heparin   Injectable 5000 Unit(s) SubCutaneous every 8 hours  levothyroxine 75 MICROGram(s) Oral daily  pantoprazole    Tablet 40 milliGRAM(s) Oral before breakfast  sertraline 100 milliGRAM(s) Oral daily  sodium chloride 0.9%. 1000 milliLiter(s) (75 mL/Hr) IV Continuous <Continuous>  vancomycin  IVPB 1000 milliGRAM(s) IV Intermittent every 12 hours        RADIOLOGY & ADDITIONAL TESTS:    COVID-19 Bhargav Domain Antibody (21 @ 16:06)   COVID-19 Bhargav Domain Antibody Result: 136.00:     COVID-19 PCR . (21 @ 13:13)   COVID-19 PCR: NotDetec:               Patient is seen and examined at the bed side, is afebrile. She is tolerating Vancomycin okay.       REVIEW OF SYSTEMS: All other review systems are negative      ALLERGIES: No Known Allergies      Vital Signs Last 24 Hrs  T(C): 36.7 (20 Aug 2021 16:05), Max: 37 (19 Aug 2021 23:44)  T(F): 98.1 (20 Aug 2021 16:05), Max: 98.6 (19 Aug 2021 23:44)  HR: 50 (20 Aug 2021 16:05) (50 - 67)  BP: 141/58 (20 Aug 2021 16:05) (129/67 - 143/76)  BP(mean): --  RR: 18 (20 Aug 2021 16:05) (18 - 18)  SpO2: 94% (20 Aug 2021 16:05) (94% - 96%)      PHYSICAL EXAM:  GENERAL: Not in distress   CHEST/LUNG: Not using accessory muscles   HEART: s1 and s2 present  ABDOMEN:  Nontender and  Nondistended  EXTREMITIES: Right great toe redness and pain improving   CNS: Awake and Alert      LABS:                        10.3   5.55  )-----------( 245      ( 20 Aug 2021 07:13 )             32.8                           10.4   5.63  )-----------( 248      ( 19 Aug 2021 08:41 )             34.2       08-20    139  |  106  |  22<H>  ----------------------------<  114<H>  4.6   |  26  |  0.88    Ca    8.9      20 Aug 2021 07:13  Phos  3.5     08-20  Mg     1.8     08-20      08-19    140  |  108  |  25<H>  ----------------------------<  105<H>  4.9   |  27  |  1.11    Ca    9.1      19 Aug 2021 08:41  Phos  3.8     08-19  Mg     2.0     08-19        CAPILLARY BLOOD GLUCOSE  POCT Blood Glucose.: 178 mg/dL (19 Aug 2021 17:15)        Urinalysis Basic - ( 18 Aug 2021 13:31 )  Color: Yellow / Appearance: Clear / S.015 / pH: x  Gluc: x / Ketone: Negative  / Bili: Negative / Urobili: Negative   Blood: x / Protein: Negative / Nitrite: Negative   Leuk Esterase: Trace / RBC: 0-2 /HPF / WBC 0-2 /HPF   Sq Epi: x / Non Sq Epi: Few /HPF / Bacteria: Negative /HPF          MEDICATIONS  (STANDING):    amLODIPine   Tablet 10 milliGRAM(s) Oral daily  ascorbic acid 500 milliGRAM(s) Oral daily  atorvastatin 10 milliGRAM(s) Oral at bedtime  carvedilol 25 milliGRAM(s) Oral every 12 hours  clopidogrel Tablet 75 milliGRAM(s) Oral daily  ferrous    sulfate 325 milliGRAM(s) Oral three times a day  gabapentin 300 milliGRAM(s) Oral two times a day  heparin   Injectable 5000 Unit(s) SubCutaneous every 8 hours  levothyroxine 75 MICROGram(s) Oral daily  pantoprazole    Tablet 40 milliGRAM(s) Oral before breakfast  sertraline 100 milliGRAM(s) Oral daily  sodium chloride 0.9%. 1000 milliLiter(s) (75 mL/Hr) IV Continuous <Continuous>  vancomycin  IVPB 1000 milliGRAM(s) IV Intermittent every 12 hours        RADIOLOGY & ADDITIONAL TESTS:    COVID-19 Bhargav Domain Antibody (21 @ 16:06)   COVID-19 Bhargav Domain Antibody Result: 136.00:     COVID-19 PCR . (21 @ 13:13)   COVID-19 PCR: NotDetec:

## 2021-08-20 NOTE — PROGRESS NOTE ADULT - PROBLEM SELECTOR PLAN 1
- sent in by PCP for hyperkalemia K was found to be 6.4.   - likely 2/2 bactrim  - EKG no changes  - Current K - 4.6  - c/w IVF, and cocktail as needed  - monitor BMP

## 2021-08-21 LAB
ANION GAP SERPL CALC-SCNC: 5 MMOL/L — SIGNIFICANT CHANGE UP (ref 5–17)
BASOPHILS # BLD AUTO: 0.05 K/UL — SIGNIFICANT CHANGE UP (ref 0–0.2)
BASOPHILS NFR BLD AUTO: 0.9 % — SIGNIFICANT CHANGE UP (ref 0–2)
BUN SERPL-MCNC: 21 MG/DL — HIGH (ref 7–18)
CALCIUM SERPL-MCNC: 9 MG/DL — SIGNIFICANT CHANGE UP (ref 8.4–10.5)
CHLORIDE SERPL-SCNC: 108 MMOL/L — SIGNIFICANT CHANGE UP (ref 96–108)
CO2 SERPL-SCNC: 26 MMOL/L — SIGNIFICANT CHANGE UP (ref 22–31)
CREAT SERPL-MCNC: 0.86 MG/DL — SIGNIFICANT CHANGE UP (ref 0.5–1.3)
EOSINOPHIL # BLD AUTO: 0.47 K/UL — SIGNIFICANT CHANGE UP (ref 0–0.5)
EOSINOPHIL NFR BLD AUTO: 8.9 % — HIGH (ref 0–6)
GLUCOSE BLDC GLUCOMTR-MCNC: 108 MG/DL — HIGH (ref 70–99)
GLUCOSE BLDC GLUCOMTR-MCNC: 142 MG/DL — HIGH (ref 70–99)
GLUCOSE BLDC GLUCOMTR-MCNC: 97 MG/DL — SIGNIFICANT CHANGE UP (ref 70–99)
GLUCOSE BLDC GLUCOMTR-MCNC: 99 MG/DL — SIGNIFICANT CHANGE UP (ref 70–99)
GLUCOSE SERPL-MCNC: 99 MG/DL — SIGNIFICANT CHANGE UP (ref 70–99)
HCT VFR BLD CALC: 33.8 % — LOW (ref 34.5–45)
HGB BLD-MCNC: 10.4 G/DL — LOW (ref 11.5–15.5)
IMM GRANULOCYTES NFR BLD AUTO: 0.6 % — SIGNIFICANT CHANGE UP (ref 0–1.5)
LYMPHOCYTES # BLD AUTO: 1.17 K/UL — SIGNIFICANT CHANGE UP (ref 1–3.3)
LYMPHOCYTES # BLD AUTO: 22.2 % — SIGNIFICANT CHANGE UP (ref 13–44)
MAGNESIUM SERPL-MCNC: 1.8 MG/DL — SIGNIFICANT CHANGE UP (ref 1.6–2.6)
MCHC RBC-ENTMCNC: 29.8 PG — SIGNIFICANT CHANGE UP (ref 27–34)
MCHC RBC-ENTMCNC: 30.8 GM/DL — LOW (ref 32–36)
MCV RBC AUTO: 96.8 FL — SIGNIFICANT CHANGE UP (ref 80–100)
MONOCYTES # BLD AUTO: 0.7 K/UL — SIGNIFICANT CHANGE UP (ref 0–0.9)
MONOCYTES NFR BLD AUTO: 13.3 % — SIGNIFICANT CHANGE UP (ref 2–14)
NEUTROPHILS # BLD AUTO: 2.85 K/UL — SIGNIFICANT CHANGE UP (ref 1.8–7.4)
NEUTROPHILS NFR BLD AUTO: 54.1 % — SIGNIFICANT CHANGE UP (ref 43–77)
NRBC # BLD: 0 /100 WBCS — SIGNIFICANT CHANGE UP (ref 0–0)
PHOSPHATE SERPL-MCNC: 4.1 MG/DL — SIGNIFICANT CHANGE UP (ref 2.5–4.5)
PLATELET # BLD AUTO: 254 K/UL — SIGNIFICANT CHANGE UP (ref 150–400)
POTASSIUM SERPL-MCNC: 4.5 MMOL/L — SIGNIFICANT CHANGE UP (ref 3.5–5.3)
POTASSIUM SERPL-SCNC: 4.5 MMOL/L — SIGNIFICANT CHANGE UP (ref 3.5–5.3)
RBC # BLD: 3.49 M/UL — LOW (ref 3.8–5.2)
RBC # FLD: 14.5 % — SIGNIFICANT CHANGE UP (ref 10.3–14.5)
SODIUM SERPL-SCNC: 139 MMOL/L — SIGNIFICANT CHANGE UP (ref 135–145)
WBC # BLD: 5.27 K/UL — SIGNIFICANT CHANGE UP (ref 3.8–10.5)
WBC # FLD AUTO: 5.27 K/UL — SIGNIFICANT CHANGE UP (ref 3.8–10.5)

## 2021-08-21 RX ADMIN — ATORVASTATIN CALCIUM 10 MILLIGRAM(S): 80 TABLET, FILM COATED ORAL at 21:17

## 2021-08-21 RX ADMIN — Medication 250 MILLIGRAM(S): at 18:41

## 2021-08-21 RX ADMIN — ZOLPIDEM TARTRATE 5 MILLIGRAM(S): 10 TABLET ORAL at 23:03

## 2021-08-21 RX ADMIN — Medication 500 MILLIGRAM(S): at 13:16

## 2021-08-21 RX ADMIN — CARVEDILOL PHOSPHATE 25 MILLIGRAM(S): 80 CAPSULE, EXTENDED RELEASE ORAL at 06:24

## 2021-08-21 RX ADMIN — SERTRALINE 100 MILLIGRAM(S): 25 TABLET, FILM COATED ORAL at 13:17

## 2021-08-21 RX ADMIN — PANTOPRAZOLE SODIUM 40 MILLIGRAM(S): 20 TABLET, DELAYED RELEASE ORAL at 06:30

## 2021-08-21 RX ADMIN — HEPARIN SODIUM 5000 UNIT(S): 5000 INJECTION INTRAVENOUS; SUBCUTANEOUS at 13:17

## 2021-08-21 RX ADMIN — CLOPIDOGREL BISULFATE 75 MILLIGRAM(S): 75 TABLET, FILM COATED ORAL at 12:43

## 2021-08-21 RX ADMIN — Medication 325 MILLIGRAM(S): at 21:17

## 2021-08-21 RX ADMIN — HEPARIN SODIUM 5000 UNIT(S): 5000 INJECTION INTRAVENOUS; SUBCUTANEOUS at 21:17

## 2021-08-21 RX ADMIN — AMLODIPINE BESYLATE 10 MILLIGRAM(S): 2.5 TABLET ORAL at 06:25

## 2021-08-21 RX ADMIN — Medication 325 MILLIGRAM(S): at 13:17

## 2021-08-21 RX ADMIN — Medication 75 MICROGRAM(S): at 06:24

## 2021-08-21 RX ADMIN — HEPARIN SODIUM 5000 UNIT(S): 5000 INJECTION INTRAVENOUS; SUBCUTANEOUS at 06:30

## 2021-08-21 RX ADMIN — GABAPENTIN 300 MILLIGRAM(S): 400 CAPSULE ORAL at 18:41

## 2021-08-21 RX ADMIN — GABAPENTIN 300 MILLIGRAM(S): 400 CAPSULE ORAL at 06:30

## 2021-08-21 RX ADMIN — Medication 250 MILLIGRAM(S): at 06:36

## 2021-08-21 RX ADMIN — Medication 325 MILLIGRAM(S): at 06:24

## 2021-08-21 NOTE — PROGRESS NOTE ADULT - ASSESSMENT
Patient is a 76y old  Female with PMH of CAD, HTN, HLD, and hypthyrodisim, sent in by her PCP for hyperkalemia. Patient reports she has had generalized weakness and some pressure-like left-sided chest pain, which all self-resolved. Patient was recently discharged from the hospital with bactrim for cellulitis of right foot. Patient denies fever, chills, n/v, SOB, palpitation. ON admission, she has no fever or Leukocytosis. She has started on  IV Vancomycin and the ID consult requested to assist with further evaluation and antibiotic management.    # Right great toe Cellulitis with abscess - woubd Cx grew MRSA with resistant to Doxycycline and sensitive to Bactrim and Vancomycin with ELIJAH 2  # Hyperkalemia- most likely due to Bactrim     would recommend:    1. Continue  current doses IV Vancomycin  X 5 days   2. Monitor and keep Vancomycin level between 15 to 20  3. Pain management as needed        Attending Attestation:    Spent more than 35 minutes on total encounter, more than 50 % of the visit was spent counseling and/or coordinating care by the Attending physician. Patient is a 76y old  Female with PMH of CAD, HTN, HLD, and hypthyrodisim, sent in by her PCP for hyperkalemia. Patient reports she has had generalized weakness and some pressure-like left-sided chest pain, which all self-resolved. Patient was recently discharged from the hospital with bactrim for cellulitis of right foot. Patient denies fever, chills, n/v, SOB, palpitation. ON admission, she has no fever or Leukocytosis. She has started on  IV Vancomycin and the ID consult requested to assist with further evaluation and antibiotic management.    # Right great toe Cellulitis with abscess - woubd Cx grew MRSA with resistant to Doxycycline and sensitive to Bactrim and Vancomycin with ELIJAH 2  # Hyperkalemia- most likely due to Bactrim     would recommend:    1. Continue  current doses IV Vancomycin until 8/23/21  2. Monitor and keep Vancomycin level between 15 to 20  3. Pain management as needed   4. OOB to chair       Attending Attestation:    Spent more than 35 minutes on total encounter, more than 50 % of the visit was spent counseling and/or coordinating care by the Attending physician.

## 2021-08-21 NOTE — PROGRESS NOTE ADULT - SUBJECTIVE AND OBJECTIVE BOX
Patient is a 76y old  Female who presents with a chief complaint of symptomatic hyperkalemia (20 Aug 2021 18:56)    pt seen in tele [ x ], reg med floor [ x  ], bed [ x ], chair at bedside [   ], a+o x3 [ x ], lethargic [  ],    nad [ x ]        Allergies    No Known Allergies        Vitals    T(F): 97.7 (08-21-21 @ 05:06), Max: 98.1 (08-20-21 @ 16:05)  HR: 57 (08-21-21 @ 05:06) (50 - 67)  BP: 110/64 (08-21-21 @ 05:06) (110/64 - 143/76)  RR: 18 (08-21-21 @ 05:06) (18 - 18)  SpO2: 95% (08-21-21 @ 05:06) (94% - 96%)  Wt(kg): --  CAPILLARY BLOOD GLUCOSE      POCT Blood Glucose.: 111 mg/dL (20 Aug 2021 20:47)      Labs                          10.3   5.55  )-----------( 245      ( 20 Aug 2021 07:13 )             32.8       08-20    139  |  106  |  22<H>  ----------------------------<  114<H>  4.6   |  26  |  0.88    Ca    8.9      20 Aug 2021 07:13  Phos  3.5     08-20  Mg     1.8     08-20              Clean Catch Clean Catch (Midstream)  08-06 @ 03:31   <10,000 CFU/mL Normal Urogenital Kiersten  --  --      .Surgical Swab R great toe abscess  08-05 @ 22:11   Numerous Methicillin Resistant Staphylococcus aureus  --  Methicillin resistant Staphylococcus aureus      .Blood Blood-Peripheral  08-05 @ 18:31   No Growth Final  --  --          Radiology Results      Meds    MEDICATIONS  (STANDING):  amLODIPine   Tablet 10 milliGRAM(s) Oral daily  ascorbic acid 500 milliGRAM(s) Oral daily  atorvastatin 10 milliGRAM(s) Oral at bedtime  carvedilol 25 milliGRAM(s) Oral every 12 hours  clopidogrel Tablet 75 milliGRAM(s) Oral daily  ferrous    sulfate 325 milliGRAM(s) Oral three times a day  gabapentin 300 milliGRAM(s) Oral two times a day  heparin   Injectable 5000 Unit(s) SubCutaneous every 8 hours  levothyroxine 75 MICROGram(s) Oral daily  pantoprazole    Tablet 40 milliGRAM(s) Oral before breakfast  sertraline 100 milliGRAM(s) Oral daily  sodium chloride 0.9%. 1000 milliLiter(s) (75 mL/Hr) IV Continuous <Continuous>  vancomycin  IVPB 1000 milliGRAM(s) IV Intermittent every 12 hours      MEDICATIONS  (PRN):  acetaminophen   Tablet .. 650 milliGRAM(s) Oral every 6 hours PRN Temp greater or equal to 38C (100.4F), Mild Pain (1 - 3)  zolpidem 5 milliGRAM(s) Oral at bedtime PRN Insomnia  zolpidem 5 milliGRAM(s) Oral at bedtime PRN Insomnia      Physical Exam    Neuro :  no focal deficits  Respiratory: CTA B/L  CV: RRR, S1S2, no murmurs,   Abdominal: Soft, NT, ND +BS,  Extremities: No edema, + peripheral pulses    ASSESSMENT    Hyperkalemia,   h/o CAD,   HTN,  HLD,   hypthyrodisim,   right foot abscess with mrsa on bactrim  Osteoarthritis  Depression  Anemia  angioplasty  shoulder surgery  total knee replacement, right        PLAN    d/c tele  s/p lokelma, calcium gluconate and insulin,   h/h wnl noted above   cont vanco 1g q12 x 3 more days,   id  f/u   cont current meds  d/c plan after completing abx course       Patient is a 76y old  Female who presents with a chief complaint of symptomatic hyperkalemia (20 Aug 2021 18:56)    pt seen in tele [ x ], reg med floor [ x  ], bed [ x ], chair at bedside [   ], a+o x3 [ x ], lethargic [  ],    nad [ x ]        Allergies    No Known Allergies        Vitals    T(F): 97.7 (08-21-21 @ 05:06), Max: 98.1 (08-20-21 @ 16:05)  HR: 57 (08-21-21 @ 05:06) (50 - 67)  BP: 110/64 (08-21-21 @ 05:06) (110/64 - 143/76)  RR: 18 (08-21-21 @ 05:06) (18 - 18)  SpO2: 95% (08-21-21 @ 05:06) (94% - 96%)  Wt(kg): --  CAPILLARY BLOOD GLUCOSE      POCT Blood Glucose.: 111 mg/dL (20 Aug 2021 20:47)      Labs                          10.3   5.55  )-----------( 245      ( 20 Aug 2021 07:13 )             32.8       08-20    139  |  106  |  22<H>  ----------------------------<  114<H>  4.6   |  26  |  0.88    Ca    8.9      20 Aug 2021 07:13  Phos  3.5     08-20  Mg     1.8     08-20              Clean Catch Clean Catch (Midstream)  08-06 @ 03:31   <10,000 CFU/mL Normal Urogenital Kiersten  --  --      .Surgical Swab R great toe abscess  08-05 @ 22:11   Numerous Methicillin Resistant Staphylococcus aureus  --  Methicillin resistant Staphylococcus aureus      .Blood Blood-Peripheral  08-05 @ 18:31   No Growth Final  --  --          Radiology Results      Meds    MEDICATIONS  (STANDING):  amLODIPine   Tablet 10 milliGRAM(s) Oral daily  ascorbic acid 500 milliGRAM(s) Oral daily  atorvastatin 10 milliGRAM(s) Oral at bedtime  carvedilol 25 milliGRAM(s) Oral every 12 hours  clopidogrel Tablet 75 milliGRAM(s) Oral daily  ferrous    sulfate 325 milliGRAM(s) Oral three times a day  gabapentin 300 milliGRAM(s) Oral two times a day  heparin   Injectable 5000 Unit(s) SubCutaneous every 8 hours  levothyroxine 75 MICROGram(s) Oral daily  pantoprazole    Tablet 40 milliGRAM(s) Oral before breakfast  sertraline 100 milliGRAM(s) Oral daily  sodium chloride 0.9%. 1000 milliLiter(s) (75 mL/Hr) IV Continuous <Continuous>  vancomycin  IVPB 1000 milliGRAM(s) IV Intermittent every 12 hours      MEDICATIONS  (PRN):  acetaminophen   Tablet .. 650 milliGRAM(s) Oral every 6 hours PRN Temp greater or equal to 38C (100.4F), Mild Pain (1 - 3)  zolpidem 5 milliGRAM(s) Oral at bedtime PRN Insomnia  zolpidem 5 milliGRAM(s) Oral at bedtime PRN Insomnia      Physical Exam    Neuro :  no focal deficits  Respiratory: CTA B/L  CV: RRR, S1S2, no murmurs,   Abdominal: Soft, NT, ND +BS,  Extremities: No edema, + peripheral pulses    ASSESSMENT    Hyperkalemia,   h/o CAD,   HTN,  HLD,   hypthyrodisim,   right foot abscess with mrsa on bactrim  Osteoarthritis  Depression  Anemia  angioplasty  shoulder surgery  total knee replacement, right        PLAN    d/c tele  s/p lokelma, calcium gluconate and insulin,   potassium wnl noted above   cont vanco 1g q12 x 2 more days,   id  f/u   cont current meds  d/c plan after completing abx course

## 2021-08-21 NOTE — PROGRESS NOTE ADULT - SUBJECTIVE AND OBJECTIVE BOX
Patient is seen and examined at the bed side, is afebrile. She is tolerating Vancomycin okay.       REVIEW OF SYSTEMS: All other review systems are negative      ALLERGIES: No Known Allergies      Vital Signs Last 24 Hrs  T(C): 36.8 (21 Aug 2021 16:06), Max: 36.8 (21 Aug 2021 16:06)  T(F): 98.3 (21 Aug 2021 16:06), Max: 98.3 (21 Aug 2021 16:06)  HR: 52 (21 Aug 2021 16:06) (52 - 58)  BP: 122/69 (21 Aug 2021 16:06) (110/64 - 128/53)  BP(mean): --  RR: 18 (21 Aug 2021 16:06) (18 - 18)  SpO2: 96% (21 Aug 2021 16:06) (94% - 97%)      PHYSICAL EXAM:  GENERAL: Not in distress   CHEST/LUNG: Not using accessory muscles   HEART: s1 and s2 present  ABDOMEN:  Nontender and  Nondistended  EXTREMITIES: Right great toe redness and pain improving   CNS: Awake and Alert      LABS:                          10.4   5.27  )-----------( 254      ( 21 Aug 2021 06:55 )             33.8                           10.3   5.55  )-----------( 245      ( 20 Aug 2021 07:13 )             32.8       08-21    139  |  108  |  21<H>  ----------------------------<  99  4.5   |  26  |  0.86    Ca    9.0      21 Aug 2021 06:55  Phos  4.1     08-21  Mg     1.8     08-21      08-19    140  |  108  |  25<H>  ----------------------------<  105<H>  4.9   |  27  |  1.11    Ca    9.1      19 Aug 2021 08:41  Phos  3.8     08-19  Mg     2.0     08-19        CAPILLARY BLOOD GLUCOSE  POCT Blood Glucose.: 178 mg/dL (19 Aug 2021 17:15)        Urinalysis Basic - ( 18 Aug 2021 13:31 )  Color: Yellow / Appearance: Clear / S.015 / pH: x  Gluc: x / Ketone: Negative  / Bili: Negative / Urobili: Negative   Blood: x / Protein: Negative / Nitrite: Negative   Leuk Esterase: Trace / RBC: 0-2 /HPF / WBC 0-2 /HPF   Sq Epi: x / Non Sq Epi: Few /HPF / Bacteria: Negative /HPF      vVancomycin Level, Trough (21 @ 18:17)   Vancomycin Level, Trough: 17.0:    MEDICATIONS  (STANDING):    amLODIPine   Tablet 10 milliGRAM(s) Oral daily  ascorbic acid 500 milliGRAM(s) Oral daily  atorvastatin 10 milliGRAM(s) Oral at bedtime  carvedilol 25 milliGRAM(s) Oral every 12 hours  clopidogrel Tablet 75 milliGRAM(s) Oral daily  ferrous    sulfate 325 milliGRAM(s) Oral three times a day  gabapentin 300 milliGRAM(s) Oral two times a day  heparin   Injectable 5000 Unit(s) SubCutaneous every 8 hours  levothyroxine 75 MICROGram(s) Oral daily  pantoprazole    Tablet 40 milliGRAM(s) Oral before breakfast  sertraline 100 milliGRAM(s) Oral daily  sodium chloride 0.9%. 1000 milliLiter(s) (75 mL/Hr) IV Continuous <Continuous>  vancomycin  IVPB 1000 milliGRAM(s) IV Intermittent every 12 hours        RADIOLOGY & ADDITIONAL TESTS:    COVID-19 Bhargav Domain Antibody (21 @ 16:06)   COVID-19 Bhargav Domain Antibody Result: 136.00:     COVID-19 PCR . (21 @ 13:13)   COVID-19 PCR: NotDetec:                 Patient is seen and examined at the bed side, is afebrile. She mentioned feeling better.       REVIEW OF SYSTEMS: All other review systems are negative      ALLERGIES: No Known Allergies      Vital Signs Last 24 Hrs  T(C): 36.8 (21 Aug 2021 16:06), Max: 36.8 (21 Aug 2021 16:06)  T(F): 98.3 (21 Aug 2021 16:06), Max: 98.3 (21 Aug 2021 16:06)  HR: 52 (21 Aug 2021 16:06) (52 - 58)  BP: 122/69 (21 Aug 2021 16:06) (110/64 - 128/53)  BP(mean): --  RR: 18 (21 Aug 2021 16:06) (18 - 18)  SpO2: 96% (21 Aug 2021 16:06) (94% - 97%)      PHYSICAL EXAM:  GENERAL: Not in distress   CHEST/LUNG: Not using accessory muscles   HEART: s1 and s2 present  ABDOMEN:  Nontender and  Nondistended  EXTREMITIES: Right great toe redness and pain improving   CNS: Awake and Alert      LABS:                          10.4   5.27  )-----------( 254      ( 21 Aug 2021 06:55 )             33.8                           10.3   5.55  )-----------( 245      ( 20 Aug 2021 07:13 )             32.8       08-21    139  |  108  |  21<H>  ----------------------------<  99  4.5   |  26  |  0.86    Ca    9.0      21 Aug 2021 06:55  Phos  4.1     08-21  Mg     1.8     08-21      08-19    140  |  108  |  25<H>  ----------------------------<  105<H>  4.9   |  27  |  1.11    Ca    9.1      19 Aug 2021 08:41  Phos  3.8     08-19  Mg     2.0     08-19        CAPILLARY BLOOD GLUCOSE  POCT Blood Glucose.: 178 mg/dL (19 Aug 2021 17:15)        Urinalysis Basic - ( 18 Aug 2021 13:31 )  Color: Yellow / Appearance: Clear / S.015 / pH: x  Gluc: x / Ketone: Negative  / Bili: Negative / Urobili: Negative   Blood: x / Protein: Negative / Nitrite: Negative   Leuk Esterase: Trace / RBC: 0-2 /HPF / WBC 0-2 /HPF   Sq Epi: x / Non Sq Epi: Few /HPF / Bacteria: Negative /HPF      Vancomycin Level, Trough (21 @ 18:17)   Vancomycin Level, Trough: 17.0:    MEDICATIONS  (STANDING):    amLODIPine   Tablet 10 milliGRAM(s) Oral daily  ascorbic acid 500 milliGRAM(s) Oral daily  atorvastatin 10 milliGRAM(s) Oral at bedtime  carvedilol 25 milliGRAM(s) Oral every 12 hours  clopidogrel Tablet 75 milliGRAM(s) Oral daily  ferrous    sulfate 325 milliGRAM(s) Oral three times a day  gabapentin 300 milliGRAM(s) Oral two times a day  heparin   Injectable 5000 Unit(s) SubCutaneous every 8 hours  levothyroxine 75 MICROGram(s) Oral daily  pantoprazole    Tablet 40 milliGRAM(s) Oral before breakfast  sertraline 100 milliGRAM(s) Oral daily  sodium chloride 0.9%. 1000 milliLiter(s) (75 mL/Hr) IV Continuous <Continuous>  vancomycin  IVPB 1000 milliGRAM(s) IV Intermittent every 12 hours        RADIOLOGY & ADDITIONAL TESTS:    COVID-19 Bhargav Domain Antibody (21 @ 16:06)   COVID-19 Bhargav Domain Antibody Result: 136.00:     COVID-19 PCR . (21 @ 13:13)   COVID-19 PCR: NotDetec:

## 2021-08-22 LAB
ANION GAP SERPL CALC-SCNC: 4 MMOL/L — LOW (ref 5–17)
BASOPHILS # BLD AUTO: 0.04 K/UL — SIGNIFICANT CHANGE UP (ref 0–0.2)
BASOPHILS NFR BLD AUTO: 0.7 % — SIGNIFICANT CHANGE UP (ref 0–2)
BUN SERPL-MCNC: 22 MG/DL — HIGH (ref 7–18)
CALCIUM SERPL-MCNC: 8.7 MG/DL — SIGNIFICANT CHANGE UP (ref 8.4–10.5)
CHLORIDE SERPL-SCNC: 109 MMOL/L — HIGH (ref 96–108)
CO2 SERPL-SCNC: 27 MMOL/L — SIGNIFICANT CHANGE UP (ref 22–31)
CREAT SERPL-MCNC: 0.81 MG/DL — SIGNIFICANT CHANGE UP (ref 0.5–1.3)
EOSINOPHIL # BLD AUTO: 0.41 K/UL — SIGNIFICANT CHANGE UP (ref 0–0.5)
EOSINOPHIL NFR BLD AUTO: 7.5 % — HIGH (ref 0–6)
GLUCOSE SERPL-MCNC: 94 MG/DL — SIGNIFICANT CHANGE UP (ref 70–99)
HCT VFR BLD CALC: 33.8 % — LOW (ref 34.5–45)
HGB BLD-MCNC: 10.6 G/DL — LOW (ref 11.5–15.5)
IMM GRANULOCYTES NFR BLD AUTO: 0.4 % — SIGNIFICANT CHANGE UP (ref 0–1.5)
LYMPHOCYTES # BLD AUTO: 1.31 K/UL — SIGNIFICANT CHANGE UP (ref 1–3.3)
LYMPHOCYTES # BLD AUTO: 23.9 % — SIGNIFICANT CHANGE UP (ref 13–44)
MAGNESIUM SERPL-MCNC: 1.7 MG/DL — SIGNIFICANT CHANGE UP (ref 1.6–2.6)
MCHC RBC-ENTMCNC: 30.1 PG — SIGNIFICANT CHANGE UP (ref 27–34)
MCHC RBC-ENTMCNC: 31.4 GM/DL — LOW (ref 32–36)
MCV RBC AUTO: 96 FL — SIGNIFICANT CHANGE UP (ref 80–100)
MONOCYTES # BLD AUTO: 0.64 K/UL — SIGNIFICANT CHANGE UP (ref 0–0.9)
MONOCYTES NFR BLD AUTO: 11.7 % — SIGNIFICANT CHANGE UP (ref 2–14)
NEUTROPHILS # BLD AUTO: 3.06 K/UL — SIGNIFICANT CHANGE UP (ref 1.8–7.4)
NEUTROPHILS NFR BLD AUTO: 55.8 % — SIGNIFICANT CHANGE UP (ref 43–77)
NRBC # BLD: 0 /100 WBCS — SIGNIFICANT CHANGE UP (ref 0–0)
PHOSPHATE SERPL-MCNC: 3.9 MG/DL — SIGNIFICANT CHANGE UP (ref 2.5–4.5)
PLATELET # BLD AUTO: 248 K/UL — SIGNIFICANT CHANGE UP (ref 150–400)
POTASSIUM SERPL-MCNC: 4.3 MMOL/L — SIGNIFICANT CHANGE UP (ref 3.5–5.3)
POTASSIUM SERPL-SCNC: 4.3 MMOL/L — SIGNIFICANT CHANGE UP (ref 3.5–5.3)
RBC # BLD: 3.52 M/UL — LOW (ref 3.8–5.2)
RBC # FLD: 14.2 % — SIGNIFICANT CHANGE UP (ref 10.3–14.5)
SODIUM SERPL-SCNC: 140 MMOL/L — SIGNIFICANT CHANGE UP (ref 135–145)
VANCOMYCIN TROUGH SERPL-MCNC: 20.6 UG/ML — HIGH (ref 10–20)
WBC # BLD: 5.48 K/UL — SIGNIFICANT CHANGE UP (ref 3.8–10.5)
WBC # FLD AUTO: 5.48 K/UL — SIGNIFICANT CHANGE UP (ref 3.8–10.5)

## 2021-08-22 RX ORDER — VANCOMYCIN HCL 1 G
750 VIAL (EA) INTRAVENOUS
Refills: 0 | Status: DISCONTINUED | OUTPATIENT
Start: 2021-08-22 | End: 2021-08-23

## 2021-08-22 RX ORDER — VANCOMYCIN HCL 1 G
750 VIAL (EA) INTRAVENOUS ONCE
Refills: 0 | Status: COMPLETED | OUTPATIENT
Start: 2021-08-22 | End: 2021-08-22

## 2021-08-22 RX ADMIN — Medication 325 MILLIGRAM(S): at 06:24

## 2021-08-22 RX ADMIN — GABAPENTIN 300 MILLIGRAM(S): 400 CAPSULE ORAL at 06:25

## 2021-08-22 RX ADMIN — Medication 250 MILLIGRAM(S): at 06:54

## 2021-08-22 RX ADMIN — Medication 325 MILLIGRAM(S): at 21:47

## 2021-08-22 RX ADMIN — SERTRALINE 100 MILLIGRAM(S): 25 TABLET, FILM COATED ORAL at 13:05

## 2021-08-22 RX ADMIN — GABAPENTIN 300 MILLIGRAM(S): 400 CAPSULE ORAL at 18:30

## 2021-08-22 RX ADMIN — CARVEDILOL PHOSPHATE 25 MILLIGRAM(S): 80 CAPSULE, EXTENDED RELEASE ORAL at 18:30

## 2021-08-22 RX ADMIN — ZOLPIDEM TARTRATE 5 MILLIGRAM(S): 10 TABLET ORAL at 22:33

## 2021-08-22 RX ADMIN — CLOPIDOGREL BISULFATE 75 MILLIGRAM(S): 75 TABLET, FILM COATED ORAL at 13:05

## 2021-08-22 RX ADMIN — CARVEDILOL PHOSPHATE 25 MILLIGRAM(S): 80 CAPSULE, EXTENDED RELEASE ORAL at 06:23

## 2021-08-22 RX ADMIN — PANTOPRAZOLE SODIUM 40 MILLIGRAM(S): 20 TABLET, DELAYED RELEASE ORAL at 06:23

## 2021-08-22 RX ADMIN — HEPARIN SODIUM 5000 UNIT(S): 5000 INJECTION INTRAVENOUS; SUBCUTANEOUS at 06:24

## 2021-08-22 RX ADMIN — ATORVASTATIN CALCIUM 10 MILLIGRAM(S): 80 TABLET, FILM COATED ORAL at 21:47

## 2021-08-22 RX ADMIN — Medication 650 MILLIGRAM(S): at 02:00

## 2021-08-22 RX ADMIN — Medication 325 MILLIGRAM(S): at 13:05

## 2021-08-22 RX ADMIN — Medication 75 MICROGRAM(S): at 06:23

## 2021-08-22 RX ADMIN — Medication 650 MILLIGRAM(S): at 21:48

## 2021-08-22 RX ADMIN — Medication 650 MILLIGRAM(S): at 00:02

## 2021-08-22 RX ADMIN — Medication 250 MILLIGRAM(S): at 18:30

## 2021-08-22 RX ADMIN — Medication 500 MILLIGRAM(S): at 13:05

## 2021-08-22 RX ADMIN — AMLODIPINE BESYLATE 10 MILLIGRAM(S): 2.5 TABLET ORAL at 06:23

## 2021-08-22 RX ADMIN — HEPARIN SODIUM 5000 UNIT(S): 5000 INJECTION INTRAVENOUS; SUBCUTANEOUS at 21:47

## 2021-08-22 NOTE — PROGRESS NOTE ADULT - PROBLEM SELECTOR PLAN 3
- recently discharged with bactrim for MRSA infected ulcer  - wound is clean, no erythema noted  - c/w vancomycin x2d  -f/u VT 530pm 8/20    - ID, Dr. Garces, consulted
- recently discharged with bactrim for MRSA infected ulcer  - wound is clean, no erythema noted  - c/w vancomycin x2d    - ID, Dr. Garces, consulted
- recently discharged with bactrim for MRSA infected ulcer  - wound is clean, no erythema noted R big toe.   - c/w vancomycin x2d till 8/23/21 - f/u Dr. Garces recommendations.   - Vanc Trough - 20.6     - ID, Dr. Garces, consulted

## 2021-08-22 NOTE — PROGRESS NOTE ADULT - NUTRITIONAL ASSESSMENT
Diet, Regular:   Consistent Carbohydrate {Evening Snacks}  DASH/TLC {Sodium & Cholesterol Restricted} (08-18-21 @ 19:27) [Active]

## 2021-08-22 NOTE — PROGRESS NOTE ADULT - PROBLEM SELECTOR PLAN 6
- h/o HTN, irbesartan 150mg qd, norvasc 10mg qd  - c/w home meds w/ therapeutic conversion  - monitor BP

## 2021-08-22 NOTE — PROGRESS NOTE ADULT - PROBLEM SELECTOR PLAN 1
Resolved - K today - 4.3  - sent in by PCP for hyperkalemia K was found to be 6.4.   - likely 2/2 bactrim  - EKG no changes  - Current K - 4.6  - c/w IVF, and cocktail as needed  - monitor BMP Resolved - K today - 4.3  - sent in by PCP for hyperkalemia K was found to be 6.4.   - likely 2/2 bactrim - d/c currently on Vanc.   - EKG no changes  - c/w IVF, and cocktail as needed  - monitor BMP

## 2021-08-22 NOTE — PROGRESS NOTE ADULT - PROBLEM SELECTOR PLAN 4
- h/o CAD, on plavix 75mg only  - c/w plavix 75mg

## 2021-08-22 NOTE — PROGRESS NOTE ADULT - PROBLEM SELECTOR PLAN 2
- noted to have Cr 1.30 (last Cr 0.81)  - likely 2/2 bactrim  - c/w IVF  - monitor Cr  - f/u urine electrolyte
- noted to have Cr 1.30 (last Cr 0.81)  - likely 2/2 bactrim  - c/w IVF  - monitor Cr  - f/u urine electrolyte
Resolved - Cr 0.81  - noted to have Cr 1.30 initially.   - likely 2/2 bactrim  - c/w IVF  - monitor Cr  - f/u urine electrolyte

## 2021-08-22 NOTE — PROGRESS NOTE ADULT - PROBLEM SELECTOR PLAN 7
- heparin SQ for DVT ppx, transition to Lovenox when kidney fx improves

## 2021-08-22 NOTE — PROGRESS NOTE ADULT - SUBJECTIVE AND OBJECTIVE BOX
PGY-1 Progress Note discussed with attending    PAGER #: [[phone]] TILL 5:00 PM  PLEASE CONTACT ON CALL TEAM:  - On Call Team (Please refer to Renee) FROM 5:00 PM - 8:30PM  - Nightfloat Team FROM 8:30 -7:30 AM    CHIEF COMPLAINT & BRIEF HOSPITAL COURSE: HPI:  Patient is a 76yoF, w/ PMH of CAD, HTN, HLD, and hypthyrodisim, sent in by her PCP for hyperkalemia. Patient reports she has had generalized weakness and some and pressure-like left-sided chest pain, which all self-resolved. Patient was recently discharged from the hospital with bactrim for cellulitis of right foot. Patient denies fever, chills, n/v, SOB, palpitation, abdominal pain, urinary or bowel movement changes.  (18 Aug 2021 14:32)      INTERVAL HPI/OVERNIGHT EVENTS:   Patient was examined while she was lying in bed, Aox3, responding appropriately to questions, in NAD. Pt denied any chest pain, shortness of breath, nausea, vomiting or abdominal pain.     REVIEW OF SYSTEMS:  CONSTITUTIONAL: No fever, weight loss, or fatigue  RESPIRATORY: No cough, wheezing, chills or hemoptysis; No shortness of breath  CARDIOVASCULAR: No chest pain, palpitations, dizziness, or leg swelling  GASTROINTESTINAL: No abdominal pain. No nausea, vomiting, or hematemesis; No diarrhea or constipation. No melena or hematochezia.  GENITOURINARY: No dysuria or hematuria, urinary frequency  NEUROLOGICAL: No headaches, memory loss, loss of strength, numbness, or tremors  SKIN: No itching, burning, rashes, or lesions     Vital Signs Last 24 Hrs  T(C): 36.9 (22 Aug 2021 08:29), Max: 36.9 (22 Aug 2021 08:29)  T(F): 98.4 (22 Aug 2021 08:29), Max: 98.4 (22 Aug 2021 08:29)  HR: 53 (22 Aug 2021 08:29) (52 - 58)  BP: 132/50 (22 Aug 2021 08:29) (122/69 - 132/58)  BP(mean): --  RR: 16 (22 Aug 2021 08:29) (16 - 18)  SpO2: 93% (22 Aug 2021 08:29) (93% - 98%)    PHYSICAL EXAMINATION:  GENERAL: NAD, well built  HEAD:  Atraumatic, Normocephalic  EYES:  conjunctiva and sclera clear  NECK: Supple, No JVD,   CHEST/LUNG: Clear to auscultation. No rales, rhonchi, wheezing, or rubs  HEART: Regular rate and rhythm; No murmurs, rubs, or gallops  ABDOMEN: Soft, Nontender, Nondistended; Bowel sounds present  NERVOUS SYSTEM:  Alert & Oriented X3,    EXTREMITIES:  Ulcer on the Right big toe, healing appropriately.  SKIN: warm dry                          10.6   5.48  )-----------( 248      ( 22 Aug 2021 05:25 )             33.8     08-22    140  |  109<H>  |  22<H>  ----------------------------<  94  4.3   |  27  |  0.81    Ca    8.7      22 Aug 2021 05:25  Phos  3.9     08-22  Mg     1.7     08-22                CAPILLARY BLOOD GLUCOSE      RADIOLOGY & ADDITIONAL TESTS:                   PGY-1 Progress Note discussed with attending    PAGER #: [[phone]] TILL 5:00 PM  PLEASE CONTACT ON CALL TEAM:  - On Call Team (Please refer to Renee) FROM 5:00 PM - 8:30PM  - Nightfloat Team FROM 8:30 -7:30 AM    CHIEF COMPLAINT & BRIEF HOSPITAL COURSE: HPI:  Patient is a 76yoF, w/ PMH of CAD, HTN, HLD, and hypthyrodisim, sent in by her PCP for hyperkalemia. Patient reports she has had generalized weakness and some and pressure-like left-sided chest pain, which all self-resolved. Patient was recently discharged from the hospital with bactrim for cellulitis of right foot. Patient denies fever, chills, n/v, SOB, palpitation, abdominal pain, urinary or bowel movement changes.  (18 Aug 2021 14:32)      INTERVAL HPI/OVERNIGHT EVENTS:   Patient was examined while she was lying in bed, Aox3, responding appropriately to questions, in NAD. Pt denied any chest pain, shortness of breath, nausea, vomiting or abdominal pain.     REVIEW OF SYSTEMS:  CONSTITUTIONAL: No fever, weight loss, or fatigue  RESPIRATORY: No cough, wheezing, chills or hemoptysis; No shortness of breath  CARDIOVASCULAR: No chest pain, palpitations, dizziness, or leg swelling  GASTROINTESTINAL: No abdominal pain. No nausea, vomiting, or hematemesis; No diarrhea or constipation. No melena or hematochezia.  GENITOURINARY: No dysuria or hematuria, urinary frequency  NEUROLOGICAL: No headaches, memory loss, loss of strength, numbness, or tremors  SKIN: No itching, burning, rashes, or lesions     MEDICATIONS  (STANDING):  amLODIPine   Tablet 10 milliGRAM(s) Oral daily  ascorbic acid 500 milliGRAM(s) Oral daily  atorvastatin 10 milliGRAM(s) Oral at bedtime  carvedilol 25 milliGRAM(s) Oral every 12 hours  clopidogrel Tablet 75 milliGRAM(s) Oral daily  ferrous    sulfate 325 milliGRAM(s) Oral three times a day  gabapentin 300 milliGRAM(s) Oral two times a day  heparin   Injectable 5000 Unit(s) SubCutaneous every 8 hours  levothyroxine 75 MICROGram(s) Oral daily  pantoprazole    Tablet 40 milliGRAM(s) Oral before breakfast  sertraline 100 milliGRAM(s) Oral daily  sodium chloride 0.9%. 1000 milliLiter(s) (75 mL/Hr) IV Continuous <Continuous>  vancomycin  IVPB 750 milliGRAM(s) IV Intermittent two times a day    MEDICATIONS  (PRN):  acetaminophen   Tablet .. 650 milliGRAM(s) Oral every 6 hours PRN Temp greater or equal to 38C (100.4F), Mild Pain (1 - 3)  zolpidem 5 milliGRAM(s) Oral at bedtime PRN Insomnia  zolpidem 5 milliGRAM(s) Oral at bedtime PRN Insomnia      Vital Signs Last 24 Hrs  T(C): 36.9 (22 Aug 2021 08:29), Max: 36.9 (22 Aug 2021 08:29)  T(F): 98.4 (22 Aug 2021 08:29), Max: 98.4 (22 Aug 2021 08:29)  HR: 53 (22 Aug 2021 08:29) (52 - 58)  BP: 132/50 (22 Aug 2021 08:29) (122/69 - 132/58)  BP(mean): --  RR: 16 (22 Aug 2021 08:29) (16 - 18)  SpO2: 93% (22 Aug 2021 08:29) (93% - 98%)    PHYSICAL EXAMINATION:  GENERAL: NAD, well built  HEAD:  Atraumatic, Normocephalic  EYES:  conjunctiva and sclera clear  NECK: Supple, No JVD,   CHEST/LUNG: Clear to auscultation. No rales, rhonchi, wheezing, or rubs  HEART: Regular rate and rhythm; No murmurs, rubs, or gallops  ABDOMEN: Soft, Nontender, Nondistended; Bowel sounds present  NERVOUS SYSTEM:  Alert & Oriented X3,    EXTREMITIES:  Ulcer on the Right big toe, healing appropriately.  SKIN: warm dry                          10.6   5.48  )-----------( 248      ( 22 Aug 2021 05:25 )             33.8     08-22    140  |  109<H>  |  22<H>  ----------------------------<  94  4.3   |  27  |  0.81    Ca    8.7      22 Aug 2021 05:25  Phos  3.9     08-22  Mg     1.7     08-22

## 2021-08-22 NOTE — PROGRESS NOTE ADULT - PROBLEM SELECTOR PLAN 5
- h/o hypothyroidism, on synthroid 75mcg  - TSH 4.44 (8/6/2021)  - c/w home meds

## 2021-08-22 NOTE — PROGRESS NOTE ADULT - SUBJECTIVE AND OBJECTIVE BOX
Patient is seen and examined at the bed side, is afebrile. She mentioned feeling better.       REVIEW OF SYSTEMS: All other review systems are negative      ALLERGIES: No Known Allergies      Vital Signs Last 24 Hrs  T(C): 36.8 (22 Aug 2021 19:04), Max: 36.9 (22 Aug 2021 08:29)  T(F): 98.2 (22 Aug 2021 19:04), Max: 98.4 (22 Aug 2021 08:29)  HR: 57 (22 Aug 2021 19:04) (53 - 62)  BP: 146/50 (22 Aug 2021 19:04) (123/49 - 148/59)  BP(mean): --  RR: 16 (22 Aug 2021 19:04) (16 - 16)  SpO2: 95% (22 Aug 2021 19:04) (93% - 98%)      PHYSICAL EXAM:  GENERAL: Not in distress   CHEST/LUNG: Not using accessory muscles   HEART: s1 and s2 present  ABDOMEN:  Nontender and  Nondistended  EXTREMITIES: Right great toe redness and pain improving   CNS: Awake and Alert      LABS:                        10.6   5.48  )-----------( 248      ( 22 Aug 2021 05:25 )             33.8                           10.4   5.27  )-----------( 254      ( 21 Aug 2021 06:55 )             33.8         08-22    140  |  109<H>  |  22<H>  ----------------------------<  94  4.3   |  27  |  0.81    Ca    8.7      22 Aug 2021 05:25  Phos  3.9     08-22  Mg     1.7     08-22      08-21    139  |  108  |  21<H>  ----------------------------<  99  4.5   |  26  |  0.86    Ca    9.0      21 Aug 2021 06:55  Phos  4.1     08-21  Mg     1.8     08-21        CAPILLARY BLOOD GLUCOSE  POCT Blood Glucose.: 178 mg/dL (19 Aug 2021 17:15)      Urinalysis Basic - ( 18 Aug 2021 13:31 )  Color: Yellow / Appearance: Clear / S.015 / pH: x  Gluc: x / Ketone: Negative  / Bili: Negative / Urobili: Negative   Blood: x / Protein: Negative / Nitrite: Negative   Leuk Esterase: Trace / RBC: 0-2 /HPF / WBC 0-2 /HPF   Sq Epi: x / Non Sq Epi: Few /HPF / Bacteria: Negative /HPF      Vancomycin Level, Trough (21 @ 18:17)   Vancomycin Level, Trough: 17.0:    MEDICATIONS  (STANDING):    amLODIPine   Tablet 10 milliGRAM(s) Oral daily  ascorbic acid 500 milliGRAM(s) Oral daily  atorvastatin 10 milliGRAM(s) Oral at bedtime  carvedilol 25 milliGRAM(s) Oral every 12 hours  clopidogrel Tablet 75 milliGRAM(s) Oral daily  ferrous    sulfate 325 milliGRAM(s) Oral three times a day  gabapentin 300 milliGRAM(s) Oral two times a day  heparin   Injectable 5000 Unit(s) SubCutaneous every 8 hours  levothyroxine 75 MICROGram(s) Oral daily  pantoprazole    Tablet 40 milliGRAM(s) Oral before breakfast  sertraline 100 milliGRAM(s) Oral daily  sodium chloride 0.9%. 1000 milliLiter(s) (75 mL/Hr) IV Continuous <Continuous>  vancomycin  IVPB 750 milliGRAM(s) IV Intermittent two times a day        RADIOLOGY & ADDITIONAL TESTS:    COVID-19 Bhargav Domain Antibody (21 @ 16:06)   COVID-19 Bhargav Domain Antibody Result: 136.00:     COVID-19 PCR . (21 @ 13:13)   COVID-19 PCR: NotDetec:             Patient is seen and examined at the bed side, is afebrile. She has no new complaints.       REVIEW OF SYSTEMS: All other review systems are negative      ALLERGIES: No Known Allergies      Vital Signs Last 24 Hrs  T(C): 36.8 (22 Aug 2021 19:04), Max: 36.9 (22 Aug 2021 08:29)  T(F): 98.2 (22 Aug 2021 19:04), Max: 98.4 (22 Aug 2021 08:29)  HR: 57 (22 Aug 2021 19:04) (53 - 62)  BP: 146/50 (22 Aug 2021 19:04) (123/49 - 148/59)  BP(mean): --  RR: 16 (22 Aug 2021 19:04) (16 - 16)  SpO2: 95% (22 Aug 2021 19:04) (93% - 98%)      PHYSICAL EXAM:  GENERAL: Not in distress   CHEST/LUNG: Not using accessory muscles   HEART: s1 and s2 present  ABDOMEN:  Nontender and  Nondistended  EXTREMITIES: Right great toe redness and pain improving   CNS: Awake and Alert      LABS:                        10.6   5.48  )-----------( 248      ( 22 Aug 2021 05:25 )             33.8                           10.4   5.27  )-----------( 254      ( 21 Aug 2021 06:55 )             33.8         08-22    140  |  109<H>  |  22<H>  ----------------------------<  94  4.3   |  27  |  0.81    Ca    8.7      22 Aug 2021 05:25  Phos  3.9     08-22  Mg     1.7     08-22      08-21    139  |  108  |  21<H>  ----------------------------<  99  4.5   |  26  |  0.86    Ca    9.0      21 Aug 2021 06:55  Phos  4.1     08-21  Mg     1.8     08-21      CAPILLARY BLOOD GLUCOSE  POCT Blood Glucose.: 178 mg/dL (19 Aug 2021 17:15)      Urinalysis Basic - ( 18 Aug 2021 13:31 )  Color: Yellow / Appearance: Clear / S.015 / pH: x  Gluc: x / Ketone: Negative  / Bili: Negative / Urobili: Negative   Blood: x / Protein: Negative / Nitrite: Negative   Leuk Esterase: Trace / RBC: 0-2 /HPF / WBC 0-2 /HPF   Sq Epi: x / Non Sq Epi: Few /HPF / Bacteria: Negative /HPF      Vancomycin Level, Trough (21 @ 18:17)   Vancomycin Level, Trough: 17.0:    MEDICATIONS  (STANDING):    amLODIPine   Tablet 10 milliGRAM(s) Oral daily  ascorbic acid 500 milliGRAM(s) Oral daily  atorvastatin 10 milliGRAM(s) Oral at bedtime  carvedilol 25 milliGRAM(s) Oral every 12 hours  clopidogrel Tablet 75 milliGRAM(s) Oral daily  ferrous    sulfate 325 milliGRAM(s) Oral three times a day  gabapentin 300 milliGRAM(s) Oral two times a day  heparin   Injectable 5000 Unit(s) SubCutaneous every 8 hours  levothyroxine 75 MICROGram(s) Oral daily  pantoprazole    Tablet 40 milliGRAM(s) Oral before breakfast  sertraline 100 milliGRAM(s) Oral daily  sodium chloride 0.9%. 1000 milliLiter(s) (75 mL/Hr) IV Continuous <Continuous>  vancomycin  IVPB 750 milliGRAM(s) IV Intermittent two times a day      RADIOLOGY & ADDITIONAL TESTS:    COVID-19 Bhargav Domain Antibody (21 @ 16:06)   COVID-19 Bhargav Domain Antibody Result: 136.00:     COVID-19 PCR . (21 @ 13:13)   COVID-19 PCR: NotDetec:

## 2021-08-22 NOTE — PROGRESS NOTE ADULT - SUBJECTIVE AND OBJECTIVE BOX
Patient is a 76y old  Female who presents with a chief complaint of symptomatic hyperkalemia (21 Aug 2021 19:23)    pt seen in tele [ x ], reg med floor [ x  ], bed [ x ], chair at bedside [   ], a+o x3 [ x ], lethargic [  ],    nad [ x ]      Allergies    No Known Allergies        Vitals    T(F): 98.2 (08-21-21 @ 21:16), Max: 98.3 (08-21-21 @ 16:06)  HR: 58 (08-21-21 @ 21:16) (52 - 58)  BP: 132/58 (08-21-21 @ 21:16) (121/74 - 132/58)  RR: 16 (08-21-21 @ 21:16) (16 - 18)  SpO2: 98% (08-21-21 @ 21:16) (96% - 98%)  Wt(kg): --  CAPILLARY BLOOD GLUCOSE      POCT Blood Glucose.: 97 mg/dL (21 Aug 2021 21:00)      Labs                          10.6   5.48  )-----------( 248      ( 22 Aug 2021 05:25 )             33.8       08-22    140  |  109<H>  |  22<H>  ----------------------------<  94  4.3   |  27  |  0.81    Ca    8.7      22 Aug 2021 05:25  Phos  3.9     08-22  Mg     1.7     08-22              Clean Catch Clean Catch (Midstream)  08-06 @ 03:31   <10,000 CFU/mL Normal Urogenital Kiersten  --  --      .Surgical Swab R great toe abscess  08-05 @ 22:11   Numerous Methicillin Resistant Staphylococcus aureus  --  Methicillin resistant Staphylococcus aureus      .Blood Blood-Peripheral  08-05 @ 18:31   No Growth Final  --  --          Radiology Results      Meds    MEDICATIONS  (STANDING):  amLODIPine   Tablet 10 milliGRAM(s) Oral daily  ascorbic acid 500 milliGRAM(s) Oral daily  atorvastatin 10 milliGRAM(s) Oral at bedtime  carvedilol 25 milliGRAM(s) Oral every 12 hours  clopidogrel Tablet 75 milliGRAM(s) Oral daily  ferrous    sulfate 325 milliGRAM(s) Oral three times a day  gabapentin 300 milliGRAM(s) Oral two times a day  heparin   Injectable 5000 Unit(s) SubCutaneous every 8 hours  levothyroxine 75 MICROGram(s) Oral daily  pantoprazole    Tablet 40 milliGRAM(s) Oral before breakfast  sertraline 100 milliGRAM(s) Oral daily  sodium chloride 0.9%. 1000 milliLiter(s) (75 mL/Hr) IV Continuous <Continuous>  vancomycin  IVPB 1000 milliGRAM(s) IV Intermittent every 12 hours      MEDICATIONS  (PRN):  acetaminophen   Tablet .. 650 milliGRAM(s) Oral every 6 hours PRN Temp greater or equal to 38C (100.4F), Mild Pain (1 - 3)  zolpidem 5 milliGRAM(s) Oral at bedtime PRN Insomnia  zolpidem 5 milliGRAM(s) Oral at bedtime PRN Insomnia      Physical Exam    Neuro :  no focal deficits  Respiratory: CTA B/L  CV: RRR, S1S2, no murmurs,   Abdominal: Soft, NT, ND +BS,  Extremities: No edema, + peripheral pulses    ASSESSMENT    Hyperkalemia,   h/o CAD,   HTN,  HLD,   hypthyrodisim,   right foot abscess with mrsa on bactrim  Osteoarthritis  Depression  Anemia  angioplasty  shoulder surgery  total knee replacement, right        PLAN    d/c tele  s/p lokelma, calcium gluconate and insulin,   potassium wnl noted above   cont vanco 1g q12 x 2 more days,   id  f/u   cont current meds  d/c plan after completing abx course             Patient is a 76y old  Female who presents with a chief complaint of symptomatic hyperkalemia (21 Aug 2021 19:23)    pt seen in tele [ x ], reg med floor [ x  ], bed [ x ], chair at bedside [   ], a+o x3 [ x ], lethargic [  ],    nad [ x ]      Allergies    No Known Allergies        Vitals    T(F): 98.2 (08-21-21 @ 21:16), Max: 98.3 (08-21-21 @ 16:06)  HR: 58 (08-21-21 @ 21:16) (52 - 58)  BP: 132/58 (08-21-21 @ 21:16) (121/74 - 132/58)  RR: 16 (08-21-21 @ 21:16) (16 - 18)  SpO2: 98% (08-21-21 @ 21:16) (96% - 98%)  Wt(kg): --  CAPILLARY BLOOD GLUCOSE      POCT Blood Glucose.: 97 mg/dL (21 Aug 2021 21:00)      Labs                          10.6   5.48  )-----------( 248      ( 22 Aug 2021 05:25 )             33.8       08-22    140  |  109<H>  |  22<H>  ----------------------------<  94  4.3   |  27  |  0.81    Ca    8.7      22 Aug 2021 05:25  Phos  3.9     08-22  Mg     1.7     08-22              Clean Catch Clean Catch (Midstream)  08-06 @ 03:31   <10,000 CFU/mL Normal Urogenital Kiersten  --  --      .Surgical Swab R great toe abscess  08-05 @ 22:11   Numerous Methicillin Resistant Staphylococcus aureus  --  Methicillin resistant Staphylococcus aureus      .Blood Blood-Peripheral  08-05 @ 18:31   No Growth Final  --  --          Radiology Results      Meds    MEDICATIONS  (STANDING):  amLODIPine   Tablet 10 milliGRAM(s) Oral daily  ascorbic acid 500 milliGRAM(s) Oral daily  atorvastatin 10 milliGRAM(s) Oral at bedtime  carvedilol 25 milliGRAM(s) Oral every 12 hours  clopidogrel Tablet 75 milliGRAM(s) Oral daily  ferrous    sulfate 325 milliGRAM(s) Oral three times a day  gabapentin 300 milliGRAM(s) Oral two times a day  heparin   Injectable 5000 Unit(s) SubCutaneous every 8 hours  levothyroxine 75 MICROGram(s) Oral daily  pantoprazole    Tablet 40 milliGRAM(s) Oral before breakfast  sertraline 100 milliGRAM(s) Oral daily  sodium chloride 0.9%. 1000 milliLiter(s) (75 mL/Hr) IV Continuous <Continuous>  vancomycin  IVPB 1000 milliGRAM(s) IV Intermittent every 12 hours      MEDICATIONS  (PRN):  acetaminophen   Tablet .. 650 milliGRAM(s) Oral every 6 hours PRN Temp greater or equal to 38C (100.4F), Mild Pain (1 - 3)  zolpidem 5 milliGRAM(s) Oral at bedtime PRN Insomnia  zolpidem 5 milliGRAM(s) Oral at bedtime PRN Insomnia      Physical Exam    Neuro :  no focal deficits  Respiratory: CTA B/L  CV: RRR, S1S2, no murmurs,   Abdominal: Soft, NT, ND +BS,  Extremities: No edema, + peripheral pulses    ASSESSMENT    Hyperkalemia,   h/o CAD,   HTN,  HLD,   hypthyrodisim,   right foot abscess with mrsa on bactrim  Osteoarthritis  Depression  Anemia  angioplasty  shoulder surgery  total knee replacement, right        PLAN    d/c tele  s/p lokelma, calcium gluconate and insulin,   potassium wnl noted above   cont vanco 1g q12 x 1 more days,   id  f/u   cont current meds  d/c plan for am after completing abx course

## 2021-08-22 NOTE — PROGRESS NOTE ADULT - ASSESSMENT
Patient is a 76y old  Female with PMH of CAD, HTN, HLD, and hypthyrodisim, sent in by her PCP for hyperkalemia. Patient reports she has had generalized weakness and some pressure-like left-sided chest pain, which all self-resolved. Patient was recently discharged from the hospital with bactrim for cellulitis of right foot. Patient denies fever, chills, n/v, SOB, palpitation. ON admission, she has no fever or Leukocytosis. She has started on  IV Vancomycin and the ID consult requested to assist with further evaluation and antibiotic management.    # Right great toe Cellulitis with abscess - woubd Cx grew MRSA with resistant to Doxycycline and sensitive to Bactrim and Vancomycin with ELIJAH 2  # Hyperkalemia- most likely due to Bactrim     would recommend:    1. Continue  current doses IV Vancomycin until 8/23/21  2. Monitor and keep Vancomycin level between 15 to 20  3. Pain management as needed   4. OOB to chair       Attending Attestation:    Spent more than 35 minutes on total encounter, more than 50 % of the visit was spent counseling and/or coordinating care by the Attending physician. Patient is a 76y old  Female with PMH of CAD, HTN, HLD, and hypthyrodisim, sent in by her PCP for hyperkalemia. Patient reports she has had generalized weakness and some pressure-like left-sided chest pain, which all self-resolved. Patient was recently discharged from the hospital with bactrim for cellulitis of right foot. Patient denies fever, chills, n/v, SOB, palpitation. ON admission, she has no fever or Leukocytosis. She has started on  IV Vancomycin and the ID consult requested to assist with further evaluation and antibiotic management.    # Right great toe Cellulitis with abscess - woubd Cx grew MRSA with resistant to Doxycycline and sensitive to Bactrim and Vancomycin with ELIJAH 2  # Hyperkalemia- most likely due to Bactrim     would recommend:  1. Continue  current doses IV Vancomycin until 8/23/21  2. Monitor and keep Vancomycin level between 15 to 20  3. Pain management as needed   4. OOB to chair       Attending Attestation:    Spent more than 35 minutes on total encounter, more than 50 % of the visit was spent counseling and/or coordinating care by the Attending physician.

## 2021-08-23 ENCOUNTER — TRANSCRIPTION ENCOUNTER (OUTPATIENT)
Age: 76
End: 2021-08-23

## 2021-08-23 VITALS
RESPIRATION RATE: 17 BRPM | TEMPERATURE: 98 F | SYSTOLIC BLOOD PRESSURE: 134 MMHG | DIASTOLIC BLOOD PRESSURE: 62 MMHG | HEART RATE: 53 BPM | OXYGEN SATURATION: 96 %

## 2021-08-23 LAB
ANION GAP SERPL CALC-SCNC: 9 MMOL/L — SIGNIFICANT CHANGE UP (ref 5–17)
BASOPHILS # BLD AUTO: 0.04 K/UL — SIGNIFICANT CHANGE UP (ref 0–0.2)
BASOPHILS NFR BLD AUTO: 0.7 % — SIGNIFICANT CHANGE UP (ref 0–2)
BUN SERPL-MCNC: 22 MG/DL — HIGH (ref 7–18)
CALCIUM SERPL-MCNC: 9.1 MG/DL — SIGNIFICANT CHANGE UP (ref 8.4–10.5)
CHLORIDE SERPL-SCNC: 106 MMOL/L — SIGNIFICANT CHANGE UP (ref 96–108)
CO2 SERPL-SCNC: 24 MMOL/L — SIGNIFICANT CHANGE UP (ref 22–31)
CREAT SERPL-MCNC: 0.81 MG/DL — SIGNIFICANT CHANGE UP (ref 0.5–1.3)
EOSINOPHIL # BLD AUTO: 0.39 K/UL — SIGNIFICANT CHANGE UP (ref 0–0.5)
EOSINOPHIL NFR BLD AUTO: 6.9 % — HIGH (ref 0–6)
GLUCOSE SERPL-MCNC: 133 MG/DL — HIGH (ref 70–99)
HCT VFR BLD CALC: 32.5 % — LOW (ref 34.5–45)
HGB BLD-MCNC: 10.1 G/DL — LOW (ref 11.5–15.5)
IMM GRANULOCYTES NFR BLD AUTO: 0.5 % — SIGNIFICANT CHANGE UP (ref 0–1.5)
LYMPHOCYTES # BLD AUTO: 1.15 K/UL — SIGNIFICANT CHANGE UP (ref 1–3.3)
LYMPHOCYTES # BLD AUTO: 20.3 % — SIGNIFICANT CHANGE UP (ref 13–44)
MAGNESIUM SERPL-MCNC: 1.9 MG/DL — SIGNIFICANT CHANGE UP (ref 1.6–2.6)
MCHC RBC-ENTMCNC: 29.5 PG — SIGNIFICANT CHANGE UP (ref 27–34)
MCHC RBC-ENTMCNC: 31.1 GM/DL — LOW (ref 32–36)
MCV RBC AUTO: 95 FL — SIGNIFICANT CHANGE UP (ref 80–100)
MONOCYTES # BLD AUTO: 0.58 K/UL — SIGNIFICANT CHANGE UP (ref 0–0.9)
MONOCYTES NFR BLD AUTO: 10.2 % — SIGNIFICANT CHANGE UP (ref 2–14)
NEUTROPHILS # BLD AUTO: 3.47 K/UL — SIGNIFICANT CHANGE UP (ref 1.8–7.4)
NEUTROPHILS NFR BLD AUTO: 61.4 % — SIGNIFICANT CHANGE UP (ref 43–77)
NRBC # BLD: 0 /100 WBCS — SIGNIFICANT CHANGE UP (ref 0–0)
PHOSPHATE SERPL-MCNC: 3.8 MG/DL — SIGNIFICANT CHANGE UP (ref 2.5–4.5)
PLATELET # BLD AUTO: 252 K/UL — SIGNIFICANT CHANGE UP (ref 150–400)
POTASSIUM SERPL-MCNC: 3.8 MMOL/L — SIGNIFICANT CHANGE UP (ref 3.5–5.3)
POTASSIUM SERPL-SCNC: 3.8 MMOL/L — SIGNIFICANT CHANGE UP (ref 3.5–5.3)
RBC # BLD: 3.42 M/UL — LOW (ref 3.8–5.2)
RBC # FLD: 14.4 % — SIGNIFICANT CHANGE UP (ref 10.3–14.5)
SODIUM SERPL-SCNC: 139 MMOL/L — SIGNIFICANT CHANGE UP (ref 135–145)
WBC # BLD: 5.66 K/UL — SIGNIFICANT CHANGE UP (ref 3.8–10.5)
WBC # FLD AUTO: 5.66 K/UL — SIGNIFICANT CHANGE UP (ref 3.8–10.5)

## 2021-08-23 PROCEDURE — 99285 EMERGENCY DEPT VISIT HI MDM: CPT

## 2021-08-23 PROCEDURE — 36415 COLL VENOUS BLD VENIPUNCTURE: CPT

## 2021-08-23 PROCEDURE — 83735 ASSAY OF MAGNESIUM: CPT

## 2021-08-23 PROCEDURE — 87635 SARS-COV-2 COVID-19 AMP PRB: CPT

## 2021-08-23 PROCEDURE — 82570 ASSAY OF URINE CREATININE: CPT

## 2021-08-23 PROCEDURE — 80202 ASSAY OF VANCOMYCIN: CPT

## 2021-08-23 PROCEDURE — 85025 COMPLETE CBC W/AUTO DIFF WBC: CPT

## 2021-08-23 PROCEDURE — 81001 URINALYSIS AUTO W/SCOPE: CPT

## 2021-08-23 PROCEDURE — 82962 GLUCOSE BLOOD TEST: CPT

## 2021-08-23 PROCEDURE — 80048 BASIC METABOLIC PNL TOTAL CA: CPT

## 2021-08-23 PROCEDURE — 84300 ASSAY OF URINE SODIUM: CPT

## 2021-08-23 PROCEDURE — 93005 ELECTROCARDIOGRAM TRACING: CPT

## 2021-08-23 PROCEDURE — 83880 ASSAY OF NATRIURETIC PEPTIDE: CPT

## 2021-08-23 PROCEDURE — 84100 ASSAY OF PHOSPHORUS: CPT

## 2021-08-23 PROCEDURE — 86769 SARS-COV-2 COVID-19 ANTIBODY: CPT

## 2021-08-23 RX ADMIN — CARVEDILOL PHOSPHATE 25 MILLIGRAM(S): 80 CAPSULE, EXTENDED RELEASE ORAL at 05:17

## 2021-08-23 RX ADMIN — HEPARIN SODIUM 5000 UNIT(S): 5000 INJECTION INTRAVENOUS; SUBCUTANEOUS at 05:16

## 2021-08-23 RX ADMIN — Medication 250 MILLIGRAM(S): at 05:16

## 2021-08-23 RX ADMIN — Medication 650 MILLIGRAM(S): at 02:06

## 2021-08-23 RX ADMIN — AMLODIPINE BESYLATE 10 MILLIGRAM(S): 2.5 TABLET ORAL at 05:17

## 2021-08-23 RX ADMIN — CLOPIDOGREL BISULFATE 75 MILLIGRAM(S): 75 TABLET, FILM COATED ORAL at 11:34

## 2021-08-23 RX ADMIN — GABAPENTIN 300 MILLIGRAM(S): 400 CAPSULE ORAL at 05:16

## 2021-08-23 RX ADMIN — Medication 500 MILLIGRAM(S): at 11:34

## 2021-08-23 RX ADMIN — SERTRALINE 100 MILLIGRAM(S): 25 TABLET, FILM COATED ORAL at 11:34

## 2021-08-23 RX ADMIN — Medication 75 MICROGRAM(S): at 05:16

## 2021-08-23 RX ADMIN — Medication 325 MILLIGRAM(S): at 05:16

## 2021-08-23 RX ADMIN — PANTOPRAZOLE SODIUM 40 MILLIGRAM(S): 20 TABLET, DELAYED RELEASE ORAL at 05:17

## 2021-08-23 NOTE — DISCHARGE NOTE NURSING/CASE MANAGEMENT/SOCIAL WORK - NSDCPEFALRISK_GEN_ALL_CORE
For information on Fall & injury Prevention, visit [email or web address]

## 2021-08-23 NOTE — DISCHARGE NOTE NURSING/CASE MANAGEMENT/SOCIAL WORK - PATIENT PORTAL LINK FT
You can access the FollowMyHealth Patient Portal offered by [facility] by registering at the following website: [email or web address]. By joining [facility]’s FollowMyHealth portal, you will also be able to view your health information using other applications (apps) compatible with our system.

## 2021-08-23 NOTE — PROGRESS NOTE ADULT - REASON FOR ADMISSION
symptomatic hyperkalemia

## 2021-08-23 NOTE — PROGRESS NOTE ADULT - PROVIDER SPECIALTY LIST ADULT
Infectious Disease
Internal Medicine
Infectious Disease
Internal Medicine
Infectious Disease
Internal Medicine

## 2021-08-23 NOTE — PROGRESS NOTE ADULT - SUBJECTIVE AND OBJECTIVE BOX
Patient is a 76y old  Female who presents with a chief complaint of symptomatic hyperkalemia (22 Aug 2021 20:16)      pt seen in tele [ x ], reg med floor [ x  ], bed [ x ], chair at bedside [   ], a+o x3 [ x ], lethargic [  ],    nad [ x ]      Allergies    No Known Allergies        Vitals    T(F): 98.4 (08-23-21 @ 05:06), Max: 98.5 (08-23-21 @ 00:03)  HR: 58 (08-23-21 @ 05:06) (53 - 62)  BP: 151/65 (08-23-21 @ 05:06) (123/49 - 151/65)  RR: 16 (08-23-21 @ 05:06) (16 - 16)  SpO2: 95% (08-23-21 @ 05:06) (93% - 96%)  Wt(kg): --  CAPILLARY BLOOD GLUCOSE      POCT Blood Glucose.: 97 mg/dL (21 Aug 2021 21:00)      Labs                          10.1   5.66  )-----------( 252      ( 23 Aug 2021 06:42 )             32.5       08-23    139  |  106  |  22<H>  ----------------------------<  133<H>  3.8   |  24  |  0.81    Ca    9.1      23 Aug 2021 06:42  Phos  3.8     08-23  Mg     1.9     08-23                  Radiology Results      Meds    MEDICATIONS  (STANDING):  amLODIPine   Tablet 10 milliGRAM(s) Oral daily  ascorbic acid 500 milliGRAM(s) Oral daily  atorvastatin 10 milliGRAM(s) Oral at bedtime  carvedilol 25 milliGRAM(s) Oral every 12 hours  clopidogrel Tablet 75 milliGRAM(s) Oral daily  ferrous    sulfate 325 milliGRAM(s) Oral three times a day  gabapentin 300 milliGRAM(s) Oral two times a day  heparin   Injectable 5000 Unit(s) SubCutaneous every 8 hours  levothyroxine 75 MICROGram(s) Oral daily  pantoprazole    Tablet 40 milliGRAM(s) Oral before breakfast  sertraline 100 milliGRAM(s) Oral daily  sodium chloride 0.9%. 1000 milliLiter(s) (75 mL/Hr) IV Continuous <Continuous>  vancomycin  IVPB 750 milliGRAM(s) IV Intermittent two times a day      MEDICATIONS  (PRN):  acetaminophen   Tablet .. 650 milliGRAM(s) Oral every 6 hours PRN Temp greater or equal to 38C (100.4F), Mild Pain (1 - 3)  zolpidem 5 milliGRAM(s) Oral at bedtime PRN Insomnia  zolpidem 5 milliGRAM(s) Oral at bedtime PRN Insomnia      Physical Exam    Neuro :  no focal deficits  Respiratory: CTA B/L  CV: RRR, S1S2, no murmurs,   Abdominal: Soft, NT, ND +BS,  Extremities: No edema, + peripheral pulses    ASSESSMENT    Hyperkalemia,   h/o CAD,   HTN,  HLD,   hypthyrodisim,   right foot abscess with mrsa on bactrim  Osteoarthritis  Depression  Anemia  angioplasty  shoulder surgery  total knee replacement, right        PLAN    d/c tele  s/p lokelma, calcium gluconate and insulin,   potassium wnl noted above   completes vanco course today  id  f/u   cont current meds  pt stable for d/c

## 2022-03-18 NOTE — DISCHARGE NOTE PROVIDER - HOSPITAL COURSE
Rasheed Patient is a 76yoF, w/ PMH of CAD, HTN, HLD, and hypothyroidism, sent in by her PCP for hyperkalemia. Patient reports she has had generalized weakness and some and pressure-like left-sided chest pain, which all self-resolved. Patient was recently discharged from the hospital with bactrim for cellulitis of right foot. Patient denies fever, chills, n/v, SOB, palpitation, abdominal pain, urinary or bowel movement changes. The pt was found to have a Potassium of 6.4 on labs. No EKG changes were found. Pt    Patient is a 76yoF, w/ PMH of CAD, HTN, HLD, and hypothyroidism, sent in by her PCP for hyperkalemia. Patient reports she has had generalized weakness and some and pressure-like left-sided chest pain, which all self-resolved. Patient was recently discharged from the hospital with bactrim for cellulitis of right foot. Patient denies fever, chills, n/v, SOB, palpitation, abdominal pain, urinary or bowel movement changes. The pt was found to have a Potassium of 6.4 on labs. No EKG changes were found. Pt also was getting ABX for cellulitis from a previous admision which was continued and pt improved. Pt's potassium dropped to 5.1 and was stabilized. Pt completed the ABX course while in the  hospital. Patient is stable for discharge. Patient has been advised to follow up as outpatient. Case has been discussed with the attending. This is just a summary of the case. For further information, please refer to patient chart document. Patient is a 76yoF, w/ PMH of CAD, HTN, HLD, and hypothyroidism, sent in by her PCP for hyperkalemia. Patient reports she has had generalized weakness and some and pressure-like left-sided chest pain, which all self-resolved. Patient was recently discharged from the hospital with bactrim for cellulitis of right foot. Patient denies fever, chills, n/v, SOB, palpitation, abdominal pain, urinary or bowel movement changes. The pt was found to have a Potassium of 6.4 on labs. No EKG changes were found. Pt also was getting ABX for cellulitis from a previous admision which was continued and pt improved. Pt was found to have MRSA from the  wound culture and got Vancomycin while in hospital and completed the course. Pt's potassium dropped to 5.1, last one being 3.8 and was stabilized. Pt completed the ABX course while in the  hospital. Patient is stable for discharge. Patient has been advised to follow up as outpatient. Case has been discussed with the attending. This is just a summary of the case. For further information, please refer to patient chart document.

## 2022-03-29 ENCOUNTER — EMERGENCY (EMERGENCY)
Facility: HOSPITAL | Age: 77
LOS: 1 days | Discharge: HOME | End: 2022-03-29
Attending: STUDENT IN AN ORGANIZED HEALTH CARE EDUCATION/TRAINING PROGRAM
Payer: COMMERCIAL

## 2022-03-29 VITALS
DIASTOLIC BLOOD PRESSURE: 80 MMHG | HEIGHT: 59 IN | HEART RATE: 71 BPM | SYSTOLIC BLOOD PRESSURE: 171 MMHG | OXYGEN SATURATION: 97 % | WEIGHT: 164.91 LBS | RESPIRATION RATE: 18 BRPM | TEMPERATURE: 98 F

## 2022-03-29 DIAGNOSIS — Z98.89 OTHER SPECIFIED POSTPROCEDURAL STATES: Chronic | ICD-10-CM

## 2022-03-29 DIAGNOSIS — K81.9 CHOLECYSTITIS, UNSPECIFIED: Chronic | ICD-10-CM

## 2022-03-29 DIAGNOSIS — Z96.651 PRESENCE OF RIGHT ARTIFICIAL KNEE JOINT: Chronic | ICD-10-CM

## 2022-03-29 PROCEDURE — 72110 X-RAY EXAM L-2 SPINE 4/>VWS: CPT | Mod: 26

## 2022-03-29 PROCEDURE — 73501 X-RAY EXAM HIP UNI 1 VIEW: CPT | Mod: 26,RT

## 2022-03-29 PROCEDURE — 73552 X-RAY EXAM OF FEMUR 2/>: CPT | Mod: 26,RT

## 2022-03-29 PROCEDURE — 72110 X-RAY EXAM L-2 SPINE 4/>VWS: CPT

## 2022-03-29 PROCEDURE — 73562 X-RAY EXAM OF KNEE 3: CPT | Mod: 26,RT

## 2022-03-29 PROCEDURE — 73562 X-RAY EXAM OF KNEE 3: CPT

## 2022-03-29 PROCEDURE — 73501 X-RAY EXAM HIP UNI 1 VIEW: CPT

## 2022-03-29 PROCEDURE — 96372 THER/PROPH/DIAG INJ SC/IM: CPT

## 2022-03-29 PROCEDURE — 99284 EMERGENCY DEPT VISIT MOD MDM: CPT

## 2022-03-29 PROCEDURE — 99284 EMERGENCY DEPT VISIT MOD MDM: CPT | Mod: 25

## 2022-03-29 PROCEDURE — 73552 X-RAY EXAM OF FEMUR 2/>: CPT

## 2022-03-29 RX ORDER — IRBESARTAN 75 MG/1
1 TABLET ORAL
Qty: 0 | Refills: 0 | DISCHARGE

## 2022-03-29 RX ORDER — FOLIC ACID 0.8 MG
1 TABLET ORAL
Qty: 0 | Refills: 0 | DISCHARGE

## 2022-03-29 RX ORDER — GABAPENTIN 400 MG/1
1 CAPSULE ORAL
Qty: 0 | Refills: 0 | DISCHARGE

## 2022-03-29 RX ORDER — KETOROLAC TROMETHAMINE 30 MG/ML
15 SYRINGE (ML) INJECTION ONCE
Refills: 0 | Status: DISCONTINUED | OUTPATIENT
Start: 2022-03-29 | End: 2022-03-29

## 2022-03-29 RX ORDER — CYCLOSPORINE 0.5 MG/ML
1 EMULSION OPHTHALMIC
Qty: 0 | Refills: 0 | DISCHARGE

## 2022-03-29 RX ORDER — ROSUVASTATIN CALCIUM 5 MG/1
1 TABLET ORAL
Qty: 0 | Refills: 0 | DISCHARGE

## 2022-03-29 RX ORDER — FERROUS SULFATE 325(65) MG
1 TABLET ORAL
Qty: 0 | Refills: 0 | DISCHARGE

## 2022-03-29 RX ORDER — SERTRALINE 25 MG/1
1 TABLET, FILM COATED ORAL
Qty: 0 | Refills: 0 | DISCHARGE

## 2022-03-29 RX ORDER — ZOLPIDEM TARTRATE 10 MG/1
1 TABLET ORAL
Qty: 0 | Refills: 0 | DISCHARGE

## 2022-03-29 RX ORDER — OMEPRAZOLE 10 MG/1
1 CAPSULE, DELAYED RELEASE ORAL
Qty: 0 | Refills: 0 | DISCHARGE

## 2022-03-29 RX ORDER — AMLODIPINE BESYLATE 2.5 MG/1
1 TABLET ORAL
Qty: 0 | Refills: 0 | DISCHARGE

## 2022-03-29 RX ORDER — ACETAMINOPHEN 500 MG
975 TABLET ORAL ONCE
Refills: 0 | Status: COMPLETED | OUTPATIENT
Start: 2022-03-29 | End: 2022-03-29

## 2022-03-29 RX ADMIN — Medication 15 MILLIGRAM(S): at 10:39

## 2022-03-29 RX ADMIN — Medication 975 MILLIGRAM(S): at 10:35

## 2022-03-29 NOTE — PHARMACOTHERAPY INTERVENTION NOTE - COMMENTS
Patient's home pharmacy ([facility] [place], [place] [phone]) on record was contacted and the Outside Medication Record was updated based on the pharmacy prescription history.    Patient takes brand name Plavix 75mg 1 tablet by mouth once daily.

## 2022-03-29 NOTE — ED PROVIDER NOTE - PATIENT PORTAL LINK FT
You can access the FollowMyHealth Patient Portal offered by [facility] by registering at the following website: [email or web address]. By joining [employer]’s FollowMyHealth portal, you will also be able to view your health information using other applications (apps) compatible with our system.

## 2022-03-29 NOTE — ED PROVIDER NOTE - CLINICAL SUMMARY MEDICAL DECISION MAKING FREE TEXT BOX
76-year-old female no medical hx presenting with R lower back pain, R hip pain, RLE pain following a fall 3 days ago. XR without acute findings, pt made aware of findings, WIll dc with PMD followup.

## 2022-03-29 NOTE — ED PROVIDER NOTE - OBJECTIVE STATEMENT
76-year-old female no medical hx presenting with R lower back pain, R hip pain, RLE pain following a fall 3 days ago. Denies numbness, tingling, weakness, or any other concerning symptoms. Able to walk. Has been taking Ibuprofen without relief.

## 2022-03-29 NOTE — ED ADULT NURSE NOTE - OBJECTIVE STATEMENT
Patient present to Ed with c/o right mid back pain starting yesterday s/p trip and fell 3days ago, bruising noted to right knee

## 2022-03-29 NOTE — ED PROVIDER NOTE - NSFOLLOWUPINSTRUCTIONS_ED_ALL_ED_FT
Lo vieron en el departamento de emergencias por: dolor de espalda/pierna después de breann caída  Se adjunta pino informe de resultados.  Para pino dolor, tome Tylenol 1000 mg cada 6 horas según sea necesario o Ibuprofen 600 mg cada 6 horas según sea necesario; puede alternar cada 3 horas según sea necesario.    El Coordinador de Referencias del Departamento de Emergencias puede comunicarse con usted para programar pino amandeep de seguimiento dentro de las 24 a 48 horas posteriores a pino ifrah, de lunes a viernes. Le recomendamos que vanessa un seguimiento con: pino médico de atención primaria    Regrese al Departamento de Emergencias si experimenta alguno de los siguientes síntomas:  - Dificultad para respirar o dificultad para respirar  - Presión, dolor u opresión en el pecho  - Brandy caída, debilidad en los brazos o dificultad para hablar  - Persistencia de vómitos severos  - Lesión en la rupal o pérdida del conocimiento  - Sangrado continuo o breann herida abierta    (1) Vanessa un seguimiento con pino médico de atención primaria dentro de las próximas 24 a 48 horas, según lo discutido. Además, no encontramos evidencia de breann enfermedad potencialmente mortal en flaco pruebas aquí hoy, glenroy a continuación se enumeran los especialistas que será necesario ger ewa paciente ambulatorio para continuar con el estudio. Llame a los números que se indican a continuación o al [phone] para programar las citas necesarias.  (2) [place] Tylenol (hasta 1000 mg o 1 g) y/o Motrin (hasta 600 mg) hasta cada 6 horas según sea necesario para el dolor.  (3) Si le colocaron breann línea IV (intravenosa), se la quitaron. A veces, después de la extracción de la vía intravenosa, kareem área puede estar sensible aylin unos días; si desarrolla enrojecimiento e hinchazón, podrían ser signos de infección; en cuyo yfn, regrese al Departamento de Emergencias para pino evaluación.  (4) Continúe tomando todos flaco medicamentos en el hogar según las indicaciones.      You were seen in the emergency department for: back/leg pain after a fall  Your results report is attached.  For your pain, please take Tylenol 1000mg every 6 hours as needed or Ibuprofen 600mg every 6 hours as needed - you can alternate every 3 hours as needed.     You may be contacted by the Emergency Department Referrals Coordinator to set up your follow-up appointment within 24-48 hours of your discharge, Monday to Friday. We recommend you follow up with: your primary care doctor    Please return to the Emergency Department if you experience any of the following symptoms:   - Shortness of breath or trouble breathing  - Pressure, pain or tightness in the chest  - Face drooping, arm weakness or speech difficulty  - Persistence of severe vomiting  - Head injury or loss of consciousness  - Nonstop bleeding or an open wound    (1) Follow up with your primary care physician within the next 24-48 hours as discussed. In addition, we did not find evidence of a life threatening illness on your testing here today, but listed below are the specialists that will be necessary to see as an outpatient to continue the workup.  Please call the numbers listed below or [phone] to set up the necessary appointments.  (2) Take Tylenol (up to 1000mg or 1 g)  and/or Motrin (up to 600mg) up to every 6 hours as needed for pain.   (3) If you had an IV (intravenous) line placed, it was removed. Sometimes, after IV removal, that area can be tender for a few days; if it develops redness and swelling, those could be signs of infection; in which case, return to the Emergency Department for assessment.  (4) Please continue taking all of your home medications as directed.

## 2022-06-22 NOTE — DISCHARGE NOTE ADULT - NS DC NO STATIN REASONS PCI
Beatriz Arreaga from [facility] pre cert called in stating pt s insurance is requesting more detailed information inorder to approve pt upcoming MRI. Pt is scheduled for 930a on 06/23/22 Jaimejuan antonio Arreaga needs to know by 2pm today if [employer] approves MRI. If she doesn't knoe hear back by 2pm she will have to contact pt and cancel MRI until insurance approves.  Beatriz Arreaga can be reached at [phone] As per PMD

## 2022-08-01 NOTE — H&P PST ADULT - GENERAL
Outpatient Care Management  Plan of Care Follow Up Visit    Patient: Bhavna Figueredo  MRN: [id number]  Date of Service: 08/01/2022  Completed by: Rekha Fernandez RN  Referral Date: 06/01/2022  Program:     Reason for Visit   Patient presents with    Update Plan Of Care       Brief Summary: See care plan     Patient Summary     Involvement of Care:  Do I have permission to speak with other family members about your care?       Patient Reported Labs & Vitals:  1.  Any Patient Reported Labs & Vitals?     2.  Patient Reported Blood Pressure:     3.  Patient Reported Pulse:     4.  Patient Reported Weight (Kg):     5.  Patient Reported Blood Glucose (mg/dl):       Medical and social history was reviewed with patient and/or caregiver.     Clinical Assessment     Reviewed and provided basic information on available community resources for mental health, transportation, wellness resources, and palliative care programs with patient and/or caregiver.     Complex Care Plan     Care plan was discussed and completed today with input from patient and/or caregiver.    Patient Instructions     Instructions were provided via the [employer] and are available for the patient to view on the patient portal.      Follow up in about 2 weeks (around 8/15/2022) for RN Follow up call.    Todays OPCM Self-Management Care Plan was developed with the patients/caregivers input and was based on identified barriers from todays assessment.  Goals were written today with the patient/caregiver and the patient has agreed to work towards these goals to improve his/her overall well-being. Patient verbalized understanding of the care plan, goals, and all of today's instructions. Encouraged patient/caregiver to communicate with his/her physician and health care team about health conditions and the treatment plan.  Provided my contact information today and encouraged patient/caregiver to call me with any questions as needed.             details…

## 2022-09-28 NOTE — ED ADULT NURSE NOTE - HISTORY OF COVID-19 VACCINATION
Patient is going to go get her mammogram and would like to have her dexa scan as well. Would be please put an order in for her dexa scan.   Thank you Yes

## 2023-05-24 NOTE — ASU PREOP CHECKLIST - WEIGHT IN LBS
[facility] Laboratory Locations - No appointment necessary. @ indicates the location is open Saturdays in addition to Monday through Friday. Call your preferred location for test preparation, business hours and other information you need. [employer] accepts [employer]. [facility]     @ [address]. 5980 [facility], [address]    Ph: 28 [facility] [place], [address]    Ph: [phone]   @ [address] [place] [address].[facility]    Ph: Ronnie [address]    Ph: [phone]  @ [address]   Ph: [phone]  @ [address] [place] [address]. Steve Kasper [facility] [address]    Ph: [address] [place] [address] [facility] 19   Ph: [phone]    [place]    @ [facility]. [address] [place] [address]    Ph: [phone]  [facility]   [address] [facility], [address]   Ph: [place] [address] 30: 311 [facility] Jez Barnes    Ph: [phone]   [address] [facility]. Jez Solorzano   Ph: [address] [facility] [address] [place], [place] [address]    Ph: [phone]     Check blood pressure at home for 2 weeks and call.
153
